# Patient Record
Sex: FEMALE | Race: WHITE | NOT HISPANIC OR LATINO | Employment: FULL TIME | URBAN - NONMETROPOLITAN AREA
[De-identification: names, ages, dates, MRNs, and addresses within clinical notes are randomized per-mention and may not be internally consistent; named-entity substitution may affect disease eponyms.]

---

## 2019-10-16 ENCOUNTER — OFFICE VISIT (OUTPATIENT)
Dept: FAMILY MEDICINE | Facility: CLINIC | Age: 24
End: 2019-10-16
Payer: COMMERCIAL

## 2019-10-16 VITALS
BODY MASS INDEX: 31.15 KG/M2 | DIASTOLIC BLOOD PRESSURE: 72 MMHG | HEART RATE: 82 BPM | OXYGEN SATURATION: 99 % | RESPIRATION RATE: 12 BRPM | SYSTOLIC BLOOD PRESSURE: 118 MMHG | HEIGHT: 61 IN | TEMPERATURE: 99.2 F | WEIGHT: 165 LBS

## 2019-10-16 DIAGNOSIS — R21 RASH AND NONSPECIFIC SKIN ERUPTION: Primary | ICD-10-CM

## 2019-10-16 PROCEDURE — 99213 OFFICE O/P EST LOW 20 MIN: CPT | Performed by: FAMILY MEDICINE

## 2019-10-16 RX ORDER — TRIAMCINOLONE ACETONIDE 5 MG/G
OINTMENT TOPICAL
Qty: 15 G | Refills: 1 | Status: SHIPPED | OUTPATIENT
Start: 2019-10-16 | End: 2020-09-08

## 2019-10-16 ASSESSMENT — MIFFLIN-ST. JEOR: SCORE: 1429.78

## 2019-10-16 ASSESSMENT — PAIN SCALES - GENERAL: PAINLEVEL: NO PAIN (0)

## 2019-10-16 NOTE — NURSING NOTE
"Chief Complaint   Patient presents with     Derm Problem     /72   Pulse 82   Temp 99.2  F (37.3  C) (Tympanic)   Resp 12   Ht 1.556 m (5' 1.25\")   Wt 74.8 kg (165 lb)   LMP  (LMP Unknown)   SpO2 99%   BMI 30.92 kg/m   Estimated body mass index is 30.92 kg/m  as calculated from the following:    Height as of this encounter: 1.556 m (5' 1.25\").    Weight as of this encounter: 74.8 kg (165 lb).  Patient presents to the clinic using No DME      Health Maintenance that is potentially due pending provider review:    Health Maintenance Due   Topic Date Due     PREVENTIVE CARE VISIT  1993     HIV SCREENING  03/18/2008     HPV IMMUNIZATION (2 - Female 3-dose series) 08/25/2011     PNEUMOCOCCAL IMMUNIZATION 19-64 MEDIUM RISK (1 of 1 - PPSV23) 03/18/2012     PAP  09/17/2017     PHQ-2  01/01/2019     INFLUENZA VACCINE (1) 09/01/2019        Pap done at [facility]  Declines flu shot.        "

## 2019-10-16 NOTE — LETTER
October 16, 2019      Laura Gaston  [address] [place] [address]        To Whom It May Concern:    Laura Gaston was seen in our clinic 10/16/19. Please excuse her absence.       Sincerely,        Brad Dupont MD

## 2019-10-16 NOTE — PROGRESS NOTES
SUBJECTIVE   Laura Gaston is a 24 year old female who presents with     Rash      Duration: 10/10/19    Description  Location: hands, arms, legs, feet - rash went away but stayed on hands  Itching: itch at beginning of sx's    Intensity:  moderate    Accompanying signs and symptoms: numb in rash area, scabs,     History (similar episodes/previous evaluation): autoimmune disorder lichenoid kerotisis    Precipitating or alleviating factors:  New exposures:  None  Recent travel: YES- was in [place] 10/3/-10/10 rash started 10/10     Therapies tried and outcome: Lotrimin lotion      PCP   [facility] [phone]    Health Maintenance        Health Maintenance Due   Topic Date Due     PREVENTIVE CARE VISIT  1993     HIV SCREENING  03/18/2008     HPV IMMUNIZATION (2 - Female 3-dose series) 08/25/2011     PNEUMOCOCCAL IMMUNIZATION 19-64 MEDIUM RISK (1 of 1 - PPSV23) 03/18/2012     PAP  09/17/2017     PHQ-2  01/01/2019     INFLUENZA VACCINE (1) 09/01/2019       HPI        Patient Active Problem List   Diagnosis     Ankle injury     Left ankle injury     Current Outpatient Medications   Medication     NECON 1/35, 28, 1-35 MG-MCG per tablet     No current facility-administered medications for this visit.        Patient Active Problem List   Diagnosis     Ankle injury     Left ankle injury     No past surgical history on file.    Social History     Tobacco Use     Smoking status: Current Every Day Smoker     Packs/day: 0.50     Types: Cigarettes     Smokeless tobacco: Never Used   Substance Use Topics     Alcohol use: Yes     Comment: rare     Family History   Problem Relation Age of Onset     Cardiovascular Maternal Grandmother 64        aortic aneur.     Psychotic Disorder Maternal Grandmother         depression     Pancreatic Cancer Maternal Grandmother      Cancer - colorectal Maternal Grandfather      Psychotic Disorder Maternal Grandfather         depression     Psychotic Disorder Paternal  "Grandmother         depression     Psychotic Disorder Paternal Grandfather         depression         Current Outpatient Medications   Medication Sig Dispense Refill     NECON 1/35, 28, 1-35 MG-MCG per tablet Take 1 tablet by mouth daily  3     Allergies   Allergen Reactions     Latex Hives     Precaution     No lab results found.   BP Readings from Last 3 Encounters:   10/16/19 118/72   08/03/16 122/70   11/23/14 128/70    Wt Readings from Last 3 Encounters:   10/16/19 74.8 kg (165 lb)   08/03/16 74.8 kg (164 lb 12.8 oz)   11/23/14 72.2 kg (159 lb 3.2 oz)                    Reviewed and updated:  Tobacco  Allergies  Meds  Med Hx  Surg Hx  Fam Hx  Soc Hx     ROS:  Constitutional, HEENT, cardiovascular, pulmonary, gi and gu systems are negative, except as otherwise noted.    PHYSICAL EXAM   /72   Pulse 82   Temp 99.2  F (37.3  C) (Tympanic)   Resp 12   Ht 1.556 m (5' 1.25\")   Wt 74.8 kg (165 lb)   LMP  (LMP Unknown)   SpO2 99%   BMI 30.92 kg/m    Body mass index is 30.92 kg/m .  GENERAL: healthy, alert and no distress  EYES: Eyes grossly normal to inspection, PERRL and conjunctivae and sclerae normal  MS: no gross musculoskeletal defects noted, no edema  SKIN: few sporadic, small erythematous rashes involving hands bilaterally and 1x involving right neck with superficial scales as shown below  NEURO: Normal strength and tone, mentation intact and speech normal                      Assessment & Plan     (R21) Rash and nonspecific skin eruption  (primary encounter diagnosis)  Comment: Differentials discussed in detail including nummular dermatitis, fungal infection and scabies.  Known to have autoimmune lichenoid keratosis.  Suggested to use Kenalog ointment and continue regular moisturizers.  Follow-up if rashes persist or worsen.  Patient understood and in agreement with above plan.  All questions answered.  Plan: triamcinolone (KENALOG) 0.5 % external ointment             Brad Dupont, " MD  [facility]

## 2020-05-06 ENCOUNTER — NURSE TRIAGE (OUTPATIENT)
Dept: NURSING | Facility: CLINIC | Age: 25
End: 2020-05-06

## 2020-05-06 ENCOUNTER — OFFICE VISIT (OUTPATIENT)
Dept: URGENT CARE | Facility: URGENT CARE | Age: 25
End: 2020-05-06
Payer: COMMERCIAL

## 2020-05-06 VITALS
WEIGHT: 170 LBS | HEART RATE: 86 BPM | SYSTOLIC BLOOD PRESSURE: 130 MMHG | DIASTOLIC BLOOD PRESSURE: 80 MMHG | BODY MASS INDEX: 32.1 KG/M2 | RESPIRATION RATE: 16 BRPM | OXYGEN SATURATION: 100 % | HEIGHT: 61 IN

## 2020-05-06 DIAGNOSIS — S61.012A LACERATION OF LEFT THUMB WITHOUT FOREIGN BODY WITHOUT DAMAGE TO NAIL, INITIAL ENCOUNTER: Primary | ICD-10-CM

## 2020-05-06 PROCEDURE — 12001 RPR S/N/AX/GEN/TRNK 2.5CM/<: CPT | Performed by: NURSE PRACTITIONER

## 2020-05-06 ASSESSMENT — MIFFLIN-ST. JEOR: SCORE: 1447.45

## 2020-05-06 NOTE — TELEPHONE ENCOUNTER
Mother calling with patient on the phone.  Verbal consent given by patient.  Mother says she cut her thumb on knife.  Is still bleeding but is holding pressure on it.  Cut is about an inch long.  Says she could see fat in there when she looked at the cut.  Patient is on her way to [facility] and just wants to make sure they are open.  Is in the parking lot now.    Triaged to disposition of Go to Urgent Care now.  Called [facility] to be sure they are open and okay for patient to come in.  Okay given.    Yvonne Villalobos, RN  Triage Nurse Advisor      Reason for Disposition    Skin is split open or gaping  (or length > 1/2 inch or 12 mm on the skin, 1/4 inch or 6 mm on the face)    [1] Bleeding AND [2] won't stop after 10 minutes of direct pressure (using correct technique)    Additional Information    Negative: [1] Major bleeding (e.g., actively dripping or spurting) AND [2] can't be stopped    Negative: Amputation    Negative: Shock suspected (e.g., cold/pale/clammy skin, too weak to stand, low BP, rapid pulse)    Negative: [1] Knife wound (or other possibly deep cut) AND [2] to chest, abdomen, back, neck, or head    Negative: [1] Cutter (self-mutilator) AND [2] suicidal or out-of-control    Negative: Sounds like a life-threatening emergency to the triager    Protocols used: CUTS AND [id number]

## 2020-05-06 NOTE — PATIENT INSTRUCTIONS
Keep area clean and dry.    Watch for any signs of infection and should any occur follow up with your primary care provider for recheck.    Follow-up with your primary care provider next week and as needed.    Indications for emergent return to emergency department discussed with patient, who verbalized good understanding and agreement.  Patient understands the limitations of today's evaluation.         Patient Education     Laceration: Skin Adhesive  A laceration is a cut through the skin. You have a laceration that your healthcare provider has closed with skin adhesive, a type of skin glue.  Home care  You may take acetaminophen or ibuprofen for pain, unless your provider prescribed another pain medicine. If you have chronic liver or kidney disease or ever had a stomach ulcer or gastrointestinal bleeding, talk with your healthcare provider before using these medicines.  General care    Keep the wound clean and dry. You may shower or bathe as usual, but don't use soaps, lotions, or ointments on the wound area. Don't scrub the wound. After bathing, pat the wound dry with a soft towel.    Don't scratch, rub, or pick at the adhesive film. Don't place tape directly over the film.    Don't apply liquids (such as peroxide), ointments, or creams to the wound while the film is in place.    Most skin wounds heal without problems. However, an infection sometimes occurs despite proper treatment. Therefore, watch for the signs of infection listed below.  Follow-up care  Follow up with your healthcare provider, or as advised. The adhesive film or skin glue usually falls off in 5 to 10 days.  When to seek medical advice  Call your healthcare provider right away if any of these occur:    Signs of infection:  ? Fever of 100.4 F (38 C) or higher, or as advised by your healthcare provider  ? Increasing pain in the wound  ? Increasing redness or swelling  ? Pus coming from the wound    Wound bleeds more than a small amount or  bleeding doesn t stop    Glue comes off earlier than expected and the wound edges come apart    You feel numbness or weakness in the wound area that doesn t go away  Date Last Reviewed: 6/1/2016 2000-2019 [employer]. [address] [place] [address], [place] [address]. All rights reserved. This information is not intended as a substitute for professional medical care. Always follow your healthcare professional's instructions.           Patient Education     Extremity Laceration: Skin Glue  A laceration is a cut through the skin. You have a laceration that has been closed with skin glue. This is used on cuts that have smooth edges and are not infected. It's best used on straight, clean cuts on areas that do not get a lot of tension.  You may need a tetanus shot. This is given if you have no record of a shot, and the object that caused the cut may lead to tetanus.  Home care    Your healthcare provider may prescribe an antibiotic. This is to help prevent infection. Follow all instructions for taking this medicine. Take the medicine every day until it is gone or you are told to stop. You should not have any left over.    The healthcare provider may prescribe medicines for pain. Follow instructions for taking them.    Follow the healthcare provider s instructions on how to care for the cut.    No bandage is needed. Skin glue peels off on its own within 5 to 10 days. Most skin wounds heal within 10 days.    Keep the wound clean. You may shower or bathe as usual, but do not use soaps, lotions, or ointments on the wound area. Do not scrub the wound. After bathing, pat the wound dry with a soft towel.    Don't scratch, rub, or pick at the film. Don't place tape directly over the film.    Don't put liquids such as peroxide, ointments, or creams on the wound while the skin glue is in place. Many oil based products can weaken and dissolve the glue.    Don't do any activities that may reinjure your wound.    Don't do  any activities that cause heavy sweating. Protect the wound from sunlight.    Most skin wounds heal without problems. But an infection sometimes occurs even with proper treatment. Watch for the signs of infection listed below.  Follow-up care  Follow up as directed with your healthcare provider, or as advised.  When to seek medical advice  Call your healthcare provider right away if any of these occur:    Wound bleeding not controlled by direct pressure    Signs of infection, including increasing pain in the wound, increasing wound redness or swelling, or pus or bad odor coming from the wound    Fever of 100.4 F (38. C) or higher, or as directed by your healthcare provider    Wound edges reopen    Wound changes colors    Numbness around the wound     Decreased movement around the injured area  Date Last Reviewed: 7/1/2017 2000-2019 [employer]. [address] [place] [address], [place] [address]. All rights reserved. This information is not intended as a substitute for professional medical care. Always follow your healthcare professional's instructions.

## 2020-09-08 ENCOUNTER — OFFICE VISIT (OUTPATIENT)
Dept: FAMILY MEDICINE | Facility: CLINIC | Age: 25
End: 2020-09-08
Payer: COMMERCIAL

## 2020-09-08 ENCOUNTER — ANCILLARY PROCEDURE (OUTPATIENT)
Dept: GENERAL RADIOLOGY | Facility: CLINIC | Age: 25
End: 2020-09-08
Attending: NURSE PRACTITIONER
Payer: COMMERCIAL

## 2020-09-08 VITALS
DIASTOLIC BLOOD PRESSURE: 78 MMHG | WEIGHT: 174 LBS | RESPIRATION RATE: 16 BRPM | HEIGHT: 61 IN | SYSTOLIC BLOOD PRESSURE: 138 MMHG | HEART RATE: 92 BPM | BODY MASS INDEX: 32.85 KG/M2 | TEMPERATURE: 98.5 F

## 2020-09-08 DIAGNOSIS — M25.572 PAIN IN JOINT, ANKLE AND FOOT, LEFT: Primary | ICD-10-CM

## 2020-09-08 DIAGNOSIS — M25.572 PAIN IN JOINT, ANKLE AND FOOT, LEFT: ICD-10-CM

## 2020-09-08 PROCEDURE — 99213 OFFICE O/P EST LOW 20 MIN: CPT | Performed by: NURSE PRACTITIONER

## 2020-09-08 PROCEDURE — 73610 X-RAY EXAM OF ANKLE: CPT | Mod: LT

## 2020-09-08 ASSESSMENT — MIFFLIN-ST. JEOR: SCORE: 1465.6

## 2020-09-08 NOTE — PROGRESS NOTES
"Subjective     Laura Gaston is a 25 year old female who presents to clinic today for the following health issues:    HPI       Musculoskeletal problem/pain  Onset/Duration: 4 days   Description  Location: ankle  - left  Joint Swelling: YES  Redness: no - bruising   Pain: YES  Warmth: no  Intensity:  moderate  Progression of Symptoms:  improving and same  Accompanying signs and symptoms:   Fevers: no  Numbness/tingling/weakness: YES- tingling   History  Trauma to the area: YES- twisted ankle   Recent illness:  no  Previous similar problem: no  Previous evaluation:  no  Precipitating or alleviating factors:  Aggravating factors include: standing and walking  Therapies tried and outcome: ice, support wrap, acetaminophen and Ibuprofen    Review of Systems   Constitutional, HEENT, cardiovascular, pulmonary, gi and gu systems are negative, except as otherwise noted.      Objective    /78 (Cuff Size: Adult Large)   Pulse 92   Temp 98.5  F (36.9  C) (Tympanic)   Resp 16   Ht 1.556 m (5' 1.25\")   Wt 78.9 kg (174 lb)   BMI 32.61 kg/m    Body mass index is 32.61 kg/m .  Physical Exam   GENERAL: healthy, alert and no distress  MS: tenderness to palpation left lateral malleolus, flexion and extension limited by discomfort   PSYCH: mentation appears normal, affect normal/bright    Xray - negative per SIM Jaime CNP          Assessment & Plan     Pain in joint, ankle and foot, left  No acute distress.  X ray unremarkable per SIM Jaime CNP.  Continue with supportive bracing, ice and rest.  Symptomatic care and follow up discussed.  - XR Ankle Left G/E 3 Views; Future     Home care instructions were reviewed with the patient. The risks, benefits and treatment options of prescribed medications or other treatments have been discussed with the patient. The patient verbalized their understanding and should call or follow up if no improvement or if they develop further problems.    Patient " Instructions     Patient Education     Treating Ankle Sprains  Treatment will depend on how bad your sprain is. For a severe sprain, healing may take 3 months or more.  Right after your injury: Use R.I.C.E.    BIG: Rest: At first, keep weight off the ankle as much as you can. You may be given crutches to help you walk without putting weight on the ankle.    BIG: Ice: Put an ice pack on the ankle for 20 minutes. Remove the pack and wait at least 30 minutes. Repeat for up to 3 days. This helps reduce swelling.    BIG: Compression: To reduce swelling and keep the joint stable, you may need to wrap the ankle with an elastic bandage. For more severe sprains, you may need an ankle brace, a boot, or a cast.    BIG: Elevation: To reduce swelling, keep your ankle raised above your heart when you sit or lie down.  Medicine  Your healthcare provider may suggest oral nonsteroidal anti-inflammatory medicine (NSAIDs), such as ibuprofen. This relieves the pain and helps reduce swelling. Be sure to take your medicine as directed.  Exercises    After about 2 to 3 weeks, you may be given exercises to strengthen the ligaments and muscles in the ankle. Doing these exercises will help prevent another ankle sprain. Exercises may include standing on your toes and then on your heels and doing ankle curls.    Sit on the edge of a sturdy table or lie on your back.    Pull your toes toward you. Then point them away from you. Repeat for 2 to 3 minutes.  Date Last Reviewed: 1/1/2018 2000-2019 [employer]. [address] [place] [address], [place] [address]. All rights reserved. This information is not intended as a substitute for professional medical care. Always follow your healthcare professional's instructions.               Return in about 1 week (around 9/15/2020).    SIM Jaime [facility]

## 2020-09-08 NOTE — LETTER
September 8, 2020      Laura PUCKETT Alek  [address] [place] [address]        To Whom It May Concern:    Laura Gaston was seen in our clinic. Please allow her to use a stool as needed due to ankle injury for the next two weeks.      Sincerely,        SIM Jaime CNP

## 2020-09-08 NOTE — PATIENT INSTRUCTIONS
Patient Education     Treating Ankle Sprains  Treatment will depend on how bad your sprain is. For a severe sprain, healing may take 3 months or more.  Right after your injury: Use R.I.C.E.    BIG: Rest: At first, keep weight off the ankle as much as you can. You may be given crutches to help you walk without putting weight on the ankle.    BIG: Ice: Put an ice pack on the ankle for 20 minutes. Remove the pack and wait at least 30 minutes. Repeat for up to 3 days. This helps reduce swelling.    BIG: Compression: To reduce swelling and keep the joint stable, you may need to wrap the ankle with an elastic bandage. For more severe sprains, you may need an ankle brace, a boot, or a cast.    BIG: Elevation: To reduce swelling, keep your ankle raised above your heart when you sit or lie down.  Medicine  Your healthcare provider may suggest oral nonsteroidal anti-inflammatory medicine (NSAIDs), such as ibuprofen. This relieves the pain and helps reduce swelling. Be sure to take your medicine as directed.  Exercises    After about 2 to 3 weeks, you may be given exercises to strengthen the ligaments and muscles in the ankle. Doing these exercises will help prevent another ankle sprain. Exercises may include standing on your toes and then on your heels and doing ankle curls.    Sit on the edge of a sturdy table or lie on your back.    Pull your toes toward you. Then point them away from you. Repeat for 2 to 3 minutes.  Date Last Reviewed: 1/1/2018 2000-2019 [employer]. [address] [place] [address], [place] [address]. All rights reserved. This information is not intended as a substitute for professional medical care. Always follow your healthcare professional's instructions.

## 2020-11-29 ENCOUNTER — HEALTH MAINTENANCE LETTER (OUTPATIENT)
Age: 25
End: 2020-11-29

## 2021-01-06 LAB — HPV ABSTRACT: NORMAL

## 2021-04-08 NOTE — PROGRESS NOTES
Assessment & Plan     INGE (generalized anxiety disorder)  Chronic, worsening.  Discussed good self-cares, increasing daily physical activity and good nutrition.  Also discussed starting buspirone, patient agreeable.  May also use hydroxyzine as needed for anxiety and sleep.  Work on bedtime hygiene as per patient instructions.  Follow-up in 1 month virtually or in clinic for follow-up.  - busPIRone (BUSPAR) 5 MG tablet; Take 1 tablet (5 mg) by mouth 2 times daily for 5 days, THEN 1 tablet (5 mg) 3 times daily for 26 days.  - hydrOXYzine (ATARAX) 25 MG tablet; Take 1 tablet (25 mg) by mouth 3 times daily as needed for anxiety or other (insomnia)    Onychomycosis  Acute, recommend over-the-counter ProX Clearz as directed on package.  If not improving return to clinic.    Elevated blood pressure reading  Patient had elevated blood pressure reading in January at GYN visit.  Blood pressure is just under goal today.  May have anxiety component.  Recommended a nurse only or pharmacy visit for blood pressure check in approximately 1 month.    Tobacco abuse  Chronic, is ready to quit.  Has trialed Chantix in the past with side effects.  Unable to do patches.  Will start Wellbutrin tapering up after 3 days.  Can check in on progress in approximately 1 month with visit as above.  - buPROPion (WELLBUTRIN SR) 150 MG 12 hr tablet; Take 1 tablet (150 mg) by mouth daily for 3 days, THEN 1 tablet (150 mg) 2 times daily for 28 days.    Encounter for smoking cessation counseling    - buPROPion (WELLBUTRIN SR) 150 MG 12 hr tablet; Take 1 tablet (150 mg) by mouth daily for 3 days, THEN 1 tablet (150 mg) 2 times daily for 28 days.             Tobacco Cessation:   reports that she has been smoking cigarettes. She has been smoking about 0.50 packs per day. She has never used smokeless tobacco.  Tobacco Cessation Action Plan: Pharmacotherapies : Zyban/Wellbutrin    BMI:   Estimated body mass index is 32.35 kg/m  as calculated from the  "following:    Height as of this encounter: 1.556 m (5' 1.25\").    Weight as of this encounter: 78.3 kg (172 lb 9.6 oz).   Weight management plan: Discussed healthy diet and exercise guidelines    See Patient Instructions    Return in about 1 month (around 5/9/2021) for Recheck.    Maggi Frank, SIM CNP  [facility]    Darling Sanchez is a 26 year old who presents for the following health issues     HPI   Chief Complaint   Patient presents with     Anxiety     Fungal Infection     Hypertension     had elevated BP when in for pap _01/06/2021 Pap, LB + Reflex hr HPV CERVIX&CERVIX     Case Report see note   [facility] - Lab: [address]        New Patient/Transfer of Care  Depression and Anxiety Follow-Up    How are you doing with your depression since your last visit? No depression    How are you doing with your anxiety since your last visit?  Worsened     Are you having other symptoms that might be associated with depression or anxiety? No    Have you had a significant life event? No     Do you have any concerns with your use of alcohol or other drugs? No    Has difficulty shutting off mind - worries   Had a traumatic loss of grandmother  Has cut out most caffeine outside of coffee - stays away from sweets     Social History     Tobacco Use     Smoking status: Current Every Day Smoker     Packs/day: 0.50     Types: Cigarettes     Smokeless tobacco: Never Used   Substance Use Topics     Alcohol use: Yes     Comment: rare     Drug use: No     PHQ 4/9/2021   PHQ-9 Total Score 9   Q9: Thoughts of better off dead/self-harm past 2 weeks Not at all     INGE-7 SCORE 4/9/2021   Total Score 15 (severe anxiety)   Total Score 15     Last PHQ-9 4/9/2021   1.  Little interest or pleasure in doing things 0   2.  Feeling down, depressed, or hopeless 1   3.  Trouble falling or staying asleep, or sleeping too much 3   4.  Feeling tired or having little energy 3 "   5.  Poor appetite or overeating 1   6.  Feeling bad about yourself 1   7.  Trouble concentrating 0   8.  Moving slowly or restless 0   Q9: Thoughts of better off dead/self-harm past 2 weeks 0   PHQ-9 Total Score 9     INGE-7  4/9/2021   1. Feeling nervous, anxious, or on edge 3   2. Not being able to stop or control worrying 3   3. Worrying too much about different things 3   4. Trouble relaxing 3   5. Being so restless that it is hard to sit still 0   6. Becoming easily annoyed or irritable 0   7. Feeling afraid, as if something awful might happen 3   INGE-7 Total Score 15       Suicide Assessment Five-step Evaluation and Treatment (SAFE-T)      How many servings of fruits and vegetables do you eat daily?  2-3    On average, how many sweetened beverages do you drink each day (Examples: soda, juice, sweet tea, etc.  Do NOT count diet or artificially sweetened beverages)?   0    How many days per week do you exercise enough to make your heart beat faster? 3 or less    How many minutes a day do you exercise enough to make your heart beat faster? 30 - 60  How many days per week do you miss taking your medication? N/A  What makes it hard for you to take your medications?  N/A   Never has taken any medications for anxiety  Has had anxiety for 9 years    Concern -  ? Fungal infection  Onset: since last fall  Description: one toe great nail  Intensity: mild  Progression of Symptoms:  worsening  Accompanying Signs & Symptoms: 0  Previous history of similar problem: 0  Precipitating factors:        Worsened by: 0  Alleviating factors:        Improved by: 0  Therapies tried and outcome: Tried OTC fungal cream that did not help    Smoking Cessation   Tried Chantix around the age of 19 - had horrible nightmares   Unable to patches       Review of Systems   Constitutional, HEENT, cardiovascular, pulmonary, gi and gu systems are negative, except as otherwise noted.      Objective    /88   Pulse 82   Temp 98.8  F (37.1  C)  "  Ht 1.556 m (5' 1.25\")   Wt 78.3 kg (172 lb 9.6 oz)   SpO2 98%   Breastfeeding No   BMI 32.35 kg/m    Body mass index is 32.35 kg/m .  Physical Exam   GENERAL APPEARANCE: healthy, alert and no distress  RESP: lungs clear to auscultation - no rales, rhonchi or wheezes  CV: regular rates and rhythm, normal S1 S2, no S3 or S4 and no murmur, click or rub  ABDOMEN: soft, nontender, without hepatosplenomegaly or masses and bowel sounds normal  MS: extremities normal- no gross deformities noted  SKIN: no suspicious lesions or rashes  PSYCH: mentation appears normal, affect normal/bright and anxious    Diagnostic Test Results:  none            "

## 2021-04-09 ENCOUNTER — OFFICE VISIT (OUTPATIENT)
Dept: FAMILY MEDICINE | Facility: CLINIC | Age: 26
End: 2021-04-09
Payer: COMMERCIAL

## 2021-04-09 VITALS
DIASTOLIC BLOOD PRESSURE: 88 MMHG | WEIGHT: 172.6 LBS | OXYGEN SATURATION: 98 % | BODY MASS INDEX: 32.59 KG/M2 | HEART RATE: 82 BPM | TEMPERATURE: 98.8 F | SYSTOLIC BLOOD PRESSURE: 138 MMHG | HEIGHT: 61 IN

## 2021-04-09 DIAGNOSIS — Z71.6 ENCOUNTER FOR SMOKING CESSATION COUNSELING: ICD-10-CM

## 2021-04-09 DIAGNOSIS — Z72.0 TOBACCO ABUSE: ICD-10-CM

## 2021-04-09 DIAGNOSIS — R03.0 ELEVATED BLOOD PRESSURE READING: ICD-10-CM

## 2021-04-09 DIAGNOSIS — B35.1 ONYCHOMYCOSIS: ICD-10-CM

## 2021-04-09 DIAGNOSIS — F41.1 GAD (GENERALIZED ANXIETY DISORDER): Primary | ICD-10-CM

## 2021-04-09 LAB — PAP-ABSTRACT: NORMAL

## 2021-04-09 PROCEDURE — 99214 OFFICE O/P EST MOD 30 MIN: CPT | Performed by: NURSE PRACTITIONER

## 2021-04-09 RX ORDER — HYDROXYZINE HYDROCHLORIDE 25 MG/1
25 TABLET, FILM COATED ORAL 3 TIMES DAILY PRN
Qty: 30 TABLET | Refills: 0 | Status: SHIPPED | OUTPATIENT
Start: 2021-04-09 | End: 2021-10-05

## 2021-04-09 RX ORDER — BUPROPION HYDROCHLORIDE 150 MG/1
TABLET, EXTENDED RELEASE ORAL
Qty: 59 TABLET | Refills: 0 | Status: SHIPPED | OUTPATIENT
Start: 2021-04-09 | End: 2021-05-11

## 2021-04-09 RX ORDER — BUSPIRONE HYDROCHLORIDE 5 MG/1
TABLET ORAL
Qty: 88 TABLET | Refills: 0 | Status: SHIPPED | OUTPATIENT
Start: 2021-04-09 | End: 2021-05-11

## 2021-04-09 RX ORDER — NORETHINDRONE ACETATE AND ETHINYL ESTRADIOL .02; 1 MG/1; MG/1
TABLET ORAL DAILY
COMMUNITY
End: 2022-09-04

## 2021-04-09 ASSESSMENT — ANXIETY QUESTIONNAIRES
6. BECOMING EASILY ANNOYED OR IRRITABLE: NOT AT ALL
2. NOT BEING ABLE TO STOP OR CONTROL WORRYING: NEARLY EVERY DAY
7. FEELING AFRAID AS IF SOMETHING AWFUL MIGHT HAPPEN: NEARLY EVERY DAY
GAD7 TOTAL SCORE: 15
4. TROUBLE RELAXING: NEARLY EVERY DAY
GAD7 TOTAL SCORE: 15
7. FEELING AFRAID AS IF SOMETHING AWFUL MIGHT HAPPEN: NEARLY EVERY DAY
5. BEING SO RESTLESS THAT IT IS HARD TO SIT STILL: NOT AT ALL
1. FEELING NERVOUS, ANXIOUS, OR ON EDGE: NEARLY EVERY DAY
3. WORRYING TOO MUCH ABOUT DIFFERENT THINGS: NEARLY EVERY DAY
GAD7 TOTAL SCORE: 15

## 2021-04-09 ASSESSMENT — MIFFLIN-ST. JEOR: SCORE: 1454.25

## 2021-04-09 ASSESSMENT — PATIENT HEALTH QUESTIONNAIRE - PHQ9
SUM OF ALL RESPONSES TO PHQ QUESTIONS 1-9: 9
SUM OF ALL RESPONSES TO PHQ QUESTIONS 1-9: 9
10. IF YOU CHECKED OFF ANY PROBLEMS, HOW DIFFICULT HAVE THESE PROBLEMS MADE IT FOR YOU TO DO YOUR WORK, TAKE CARE OF THINGS AT HOME, OR GET ALONG WITH OTHER PEOPLE: SOMEWHAT DIFFICULT

## 2021-04-09 NOTE — PATIENT INSTRUCTIONS
INGE (generalized anxiety disorder)  Chronic, worsening.  Discussed good self-cares, increasing daily physical activity and good nutrition.  Also discussed starting buspirone, patient agreeable.  May also use hydroxyzine as needed for anxiety and sleep.  Work on bedtime hygiene as per patient instructions.  Follow-up in 1 month virtually or in clinic for follow-up.  - busPIRone (BUSPAR) 5 MG tablet; Take 1 tablet (5 mg) by mouth 2 times daily for 5 days, THEN 1 tablet (5 mg) 3 times daily for 26 days.  - hydrOXYzine (ATARAX) 25 MG tablet; Take 1 tablet (25 mg) by mouth 3 times daily as needed for anxiety or other (insomnia)    Onychomycosis  Acute, recommend over-the-counter ProX Clearz as directed on package.  If not improving return to clinic.    Elevated blood pressure reading  Patient had elevated blood pressure reading in January at GYN visit.  Blood pressure is just under goal today.  May have anxiety component.  Recommended a nurse only or pharmacy visit for blood pressure check in approximately 1 month.    Tobacco abuse  Chronic, is ready to quit.  Has trialed Chantix in the past with side effects.  Unable to do patches.  Will start Wellbutrin tapering up after 3 days.  Can check in on progress in approximately 1 month with visit as above.  - buPROPion (WELLBUTRIN SR) 150 MG 12 hr tablet; Take 1 tablet (150 mg) by mouth daily for 3 days, THEN 1 tablet (150 mg) 2 times daily for 28 days.    Encounter for smoking cessation counseling    - buPROPion (WELLBUTRIN SR) 150 MG 12 hr tablet; Take 1 tablet (150 mg) by mouth daily for 3 days, THEN 1 tablet (150 mg) 2 times daily for 28 days.

## 2021-04-10 ASSESSMENT — ANXIETY QUESTIONNAIRES: GAD7 TOTAL SCORE: 15

## 2021-05-11 ENCOUNTER — VIRTUAL VISIT (OUTPATIENT)
Dept: FAMILY MEDICINE | Facility: CLINIC | Age: 26
End: 2021-05-11
Payer: COMMERCIAL

## 2021-05-11 DIAGNOSIS — F41.1 GAD (GENERALIZED ANXIETY DISORDER): Primary | ICD-10-CM

## 2021-05-11 DIAGNOSIS — Z72.0 TOBACCO ABUSE: ICD-10-CM

## 2021-05-11 DIAGNOSIS — Z71.6 ENCOUNTER FOR SMOKING CESSATION COUNSELING: ICD-10-CM

## 2021-05-11 PROCEDURE — 99213 OFFICE O/P EST LOW 20 MIN: CPT | Mod: GT | Performed by: NURSE PRACTITIONER

## 2021-05-11 RX ORDER — BUPROPION HYDROCHLORIDE 150 MG/1
150 TABLET, EXTENDED RELEASE ORAL 2 TIMES DAILY
Qty: 180 TABLET | Refills: 1 | Status: SHIPPED | OUTPATIENT
Start: 2021-05-11 | End: 2021-11-09

## 2021-05-11 RX ORDER — BUSPIRONE HYDROCHLORIDE 15 MG/1
15 TABLET ORAL 2 TIMES DAILY
Qty: 180 TABLET | Refills: 1 | Status: SHIPPED | OUTPATIENT
Start: 2021-05-11 | End: 2021-10-05 | Stop reason: ALTCHOICE

## 2021-05-11 ASSESSMENT — ANXIETY QUESTIONNAIRES
GAD7 TOTAL SCORE: 15
5. BEING SO RESTLESS THAT IT IS HARD TO SIT STILL: SEVERAL DAYS
7. FEELING AFRAID AS IF SOMETHING AWFUL MIGHT HAPPEN: SEVERAL DAYS
2. NOT BEING ABLE TO STOP OR CONTROL WORRYING: NEARLY EVERY DAY
1. FEELING NERVOUS, ANXIOUS, OR ON EDGE: NEARLY EVERY DAY
6. BECOMING EASILY ANNOYED OR IRRITABLE: SEVERAL DAYS
3. WORRYING TOO MUCH ABOUT DIFFERENT THINGS: NEARLY EVERY DAY

## 2021-05-11 ASSESSMENT — PATIENT HEALTH QUESTIONNAIRE - PHQ9: 5. POOR APPETITE OR OVEREATING: NEARLY EVERY DAY

## 2021-05-11 NOTE — PATIENT INSTRUCTIONS
INGE (generalized anxiety disorder)  Chronic, some improved on Buspar. Tales the edge off. Feels like needs more. Sometimes having difficulty taking three times daily due to work schedule. Will increase to 15 mg two times daily. Patient to check in with visit in 3-4 weeks if not doing better. Continue to work on good self-cares.   - busPIRone (BUSPAR) 15 MG tablet; Take 1 tablet (15 mg) by mouth 2 times daily    Tobacco abuse  Started on Wellbutrin approximately 1 month ago. Tolerating well. Helping with cravings, has cut back, but hasn't quit completely. Stressors making it difficult. Continue Wellbutrin and continue to work on stressors. Follow-up as needed.   - buPROPion (WELLBUTRIN SR) 150 MG 12 hr tablet; Take 1 tablet (150 mg) by mouth 2 times daily    Encounter for smoking cessation counseling    - buPROPion (WELLBUTRIN SR) 150 MG 12 hr tablet; Take 1 tablet (150 mg) by mouth 2 times daily

## 2021-05-11 NOTE — PROGRESS NOTES
"Laura is a 26 year old who is being evaluated via a billable video visit.      How would you like to obtain your AVS? MyChart  If the video visit is dropped, the invitation should be resent by: Text to cell phone: [phone]  Will anyone else be joining your video visit? No      Video Start Time: 10:47 AM    Assessment & Plan     INGE (generalized anxiety disorder)  Chronic, some improved on Buspar. Tales the edge off. Feels like needs more. Sometimes having difficulty taking three times daily due to work schedule. Will increase to 15 mg two times daily. Patient to check in with visit in 3-4 weeks if not doing better. Continue to work on good self-cares.   - busPIRone (BUSPAR) 15 MG tablet; Take 1 tablet (15 mg) by mouth 2 times daily    Tobacco abuse  Started on Wellbutrin approximately 1 month ago. Tolerating well. Helping with cravings, has cut back, but hasn't quit completely. Stressors making it difficult. Continue Wellbutrin and continue to work on stressors. Follow-up as needed.   - buPROPion (WELLBUTRIN SR) 150 MG 12 hr tablet; Take 1 tablet (150 mg) by mouth 2 times daily    Encounter for smoking cessation counseling    - buPROPion (WELLBUTRIN SR) 150 MG 12 hr tablet; Take 1 tablet (150 mg) by mouth 2 times daily             See Patient Instructions    Return in about 1 month (around 6/11/2021), or if symptoms worsen or fail to improve.    Maggi Frank, SIM CNP  [facility]    Subjective   Laura is a 26 year old who presents for the following health issues:    HPI     Hydroxyzine-  States there is only a 15 minute window of where she becomes drowsy and if she doesn't fall asleep right away, then she's \"wired\".    Anxiety Follow-Up    How are you doing with your anxiety since your last visit? Improved Seems like medication is taking the edge off, however, feels that she wrongly assumed taking 3 pills a day would be better than taking the edge off    Are you having other " symptoms that might be associated with anxiety? No    Have you had a significant life event? No     Are you feeling depressed? No    Do you have any concerns with your use of alcohol or other drugs? No      Not doing any screen time  Working on bedtime hygiene          Social History     Tobacco Use     Smoking status: Current Every Day Smoker     Packs/day: 0.50     Types: Cigarettes     Smokeless tobacco: Never Used   Substance Use Topics     Alcohol use: Yes     Comment: rare     Drug use: No     INGE-7 SCORE 4/9/2021 5/11/2021   Total Score 15 (severe anxiety) -   Total Score 15 15     PHQ 4/9/2021   PHQ-9 Total Score 9   Q9: Thoughts of better off dead/self-harm past 2 weeks Not at all       Review of Systems   Constitutional, HEENT, cardiovascular, pulmonary, gi and gu systems are negative, except as otherwise noted.      Objective           Vitals:  No vitals were obtained today due to virtual visit.    Physical Exam   GENERAL: Healthy, alert and no distress  EYES: Eyes grossly normal to inspection.  No discharge or erythema, or obvious scleral/conjunctival abnormalities.  RESP: No audible wheeze, cough, or visible cyanosis.  No visible retractions or increased work of breathing.    SKIN: Visible skin clear. No significant rash, abnormal pigmentation or lesions.  NEURO: Cranial nerves grossly intact.  Mentation and speech appropriate for age.  PSYCH: Mentation appears normal, affect normal/bright, judgement and insight intact, normal speech and appearance well-groomed.    Diagnostic Test Results:  none            Video-Visit Details    Type of service:  Video Visit    Video End Time:11:02 AM    Originating Location (pt. Location): Home    Distant Location (provider location):  [facility]     Platform used for Video Visit: Gruvie

## 2021-05-12 ASSESSMENT — ANXIETY QUESTIONNAIRES: GAD7 TOTAL SCORE: 15

## 2021-06-14 ENCOUNTER — HOSPITAL ENCOUNTER (EMERGENCY)
Facility: CLINIC | Age: 26
Discharge: HOME | End: 2021-06-14
Attending: NURSE PRACTITIONER | Admitting: NURSE PRACTITIONER
Payer: COMMERCIAL

## 2021-06-14 VITALS
SYSTOLIC BLOOD PRESSURE: 163 MMHG | RESPIRATION RATE: 20 BRPM | BODY MASS INDEX: 32.05 KG/M2 | WEIGHT: 171 LBS | OXYGEN SATURATION: 100 % | DIASTOLIC BLOOD PRESSURE: 107 MMHG | TEMPERATURE: 98.1 F

## 2021-06-14 DIAGNOSIS — M54.50 ACUTE LOW BACK PAIN WITHOUT SCIATICA: ICD-10-CM

## 2021-06-14 PROCEDURE — G0463 HOSPITAL OUTPT CLINIC VISIT: HCPCS | Performed by: NURSE PRACTITIONER

## 2021-06-14 PROCEDURE — 99213 OFFICE O/P EST LOW 20 MIN: CPT | Performed by: NURSE PRACTITIONER

## 2021-06-14 RX ORDER — METHYLPREDNISOLONE 4 MG
TABLET, DOSE PACK ORAL
Qty: 21 TABLET | Refills: 0 | Status: SHIPPED | OUTPATIENT
Start: 2021-06-14 | End: 2021-06-21

## 2021-06-14 RX ORDER — TIZANIDINE 2 MG/1
2-4 TABLET ORAL 3 TIMES DAILY
Qty: 30 TABLET | Refills: 0 | Status: SHIPPED | OUTPATIENT
Start: 2021-06-14 | End: 2021-06-21

## 2021-06-14 RX ORDER — CELECOXIB 200 MG/1
200 CAPSULE ORAL 2 TIMES DAILY
Qty: 28 CAPSULE | Refills: 0 | Status: SHIPPED | OUTPATIENT
Start: 2021-06-14 | End: 2021-07-09 | Stop reason: ALTCHOICE

## 2021-06-14 NOTE — ED TRIAGE NOTES
middle lower back pain started three weeks ago. Pt having increased difficulty since twisting. Denies numbness and or tingling. No loss of bowel or bladder

## 2021-06-14 NOTE — ED PROVIDER NOTES
"  History     Chief Complaint   Patient presents with     Back Pain     middle lower back pain started three weeks ago. Pt having increased difficulty since twisting. Denies numbness and or tingling. No loss of bowel or bladder     HPI  Laura Gaston is a 26 year old female who presents to urgent care with reports of a 3-week history of middle to lower back pain.  Getting into car and felt sudden low back pain  Ache with sharp spasms/shooting pain with rotation  Rates pain 04-5/10 to 09/10  Denies pelvic girdle numbness, loss of bowel or bladder function  \"Felt like someone took a bat to my back\"    Patient denies fever, aches, chills, sweats, ear pain, eye pain, throat pain, chest pain, cough, wheezing, shortness of breath, abdominal pain, nausea, vomiting, diarrhea, dysuria, speech difficulty, left or right-sided body weakness, mental confusion, thoughts of harming self.  Patient reports feeling well otherwise    Allergies:  Allergies   Allergen Reactions     Latex Hives     Precaution       Problem List:    Patient Active Problem List    Diagnosis Date Noted     INGE (generalized anxiety disorder) 04/09/2021     Priority: Medium     Tobacco abuse 04/09/2021     Priority: Medium     Ankle injury 11/04/2013     Priority: Medium     Left ankle injury 11/04/2013     Priority: Medium        Past Medical History:    History reviewed. No pertinent past medical history.    Past Surgical History:    History reviewed. No pertinent surgical history.    Family History:    Family History   Problem Relation Age of Onset     Cardiovascular Maternal Grandmother 64        aortic aneur.     Psychotic Disorder Maternal Grandmother         depression     Pancreatic Cancer Maternal Grandmother      Cancer - colorectal Maternal Grandfather      Psychotic Disorder Maternal Grandfather         depression     Psychotic Disorder Paternal Grandmother         depression     Psychotic Disorder Paternal Grandfather         depression "       Social History:  Marital Status:  Single [1]  Social History     Tobacco Use     Smoking status: Current Every Day Smoker     Packs/day: 0.50     Types: Cigarettes     Smokeless tobacco: Never Used   Substance Use Topics     Alcohol use: Yes     Comment: rare     Drug use: No        Medications:    celecoxib (CELEBREX) 200 MG capsule  methylPREDNISolone (MEDROL DOSEPAK) 4 MG tablet therapy pack  tiZANidine (ZANAFLEX) 2 MG tablet  buPROPion (WELLBUTRIN SR) 150 MG 12 hr tablet  busPIRone (BUSPAR) 15 MG tablet  hydrOXYzine (ATARAX) 25 MG tablet  norethindrone-ethinyl estradiol (MICROGESTIN) 1-20 MG-MCG tablet      Review of Systems  As mentioned above in the history present illness. All other systems were reviewed and are negative.      Physical Exam   BP: (!) 163/107  Temp: 98.1  F (36.7  C)  Resp: 20  Weight: 77.6 kg (171 lb)  SpO2: 100 %      Physical Exam  Vitals signs and nursing note reviewed.   Constitutional:       General: She is not in acute distress.     Appearance: She is well-developed. She is not diaphoretic.   HENT:      Head: Normocephalic and atraumatic.      Right Ear: External ear normal.      Left Ear: External ear normal.   Eyes:      General:         Right eye: No discharge.         Left eye: No discharge.      Conjunctiva/sclera: Conjunctivae normal.   Cardiovascular:      Rate and Rhythm: Regular rhythm.      Heart sounds: Normal heart sounds. No murmur. No friction rub.   Pulmonary:      Effort: Pulmonary effort is normal. No respiratory distress.      Breath sounds: Normal breath sounds. No stridor. No wheezing or rales.   Chest:      Chest wall: No tenderness.   Musculoskeletal:      Lumbar back: She exhibits decreased range of motion and tenderness (lumbar region without spinous process step-off, bony tenderness).   Skin:     General: Skin is warm and dry.      Coloration: Skin is not pale.      Findings: No erythema or rash.   Neurological:      Mental Status: She is alert.         ED  Course        Procedures    No results found for this or any previous visit (from the past 24 hour(s)).    Medications - No data to display    Assessments & Plan (with Medical Decision Making)  26-year-old female presenting to urgent a 3-week history of acute low back pain.  Patient denying any pelvic girdle numbness, loss of bowel or bladder.  Patient reports unable to get comfortable and reports severe stabbing pain.  On exam patient has no obvious deformities.  No red flags to indicate need for imaging today. Patient has musculoskeletal pain without spinous step-offs or crepitus or masses concerning for cauda equina.  At this point in time due to the likely muscle strain I recommend be off work for 2 days and follow-up with orthopedics on or before June 25th.  Work restrictions noted for June 16th through June 25th also.  For pain management recommend Tylenol, Medrol Dosepak followed by Celebrex.  Tizanidine as prescribed as a muscle relaxer.  Patient discharged in stable condition.     I have reviewed the nursing notes.    I have reviewed the findings, diagnosis, plan and need for follow up with the patient.    Discharge Medication List as of 6/14/2021  2:32 PM      START taking these medications    Details   celecoxib (CELEBREX) 200 MG capsule Take 1 capsule (200 mg) by mouth 2 times daily for 14 days, Disp-28 capsule, R-0, E-Prescribe      methylPREDNISolone (MEDROL DOSEPAK) 4 MG tablet therapy pack Follow Package Directions, Disp-21 tablet, R-0, E-Prescribe      tiZANidine (ZANAFLEX) 2 MG tablet Take 1-2 tablets (2-4 mg) by mouth 3 times daily, Disp-30 tablet, R-0, E-Prescribe             Final diagnoses:   Acute low back pain without sciatica       6/14/2021   [facility] EMERGENCY DEPT     Temo, Mckayla Acevedo, SIM CNP  06/19/21 0738

## 2021-06-14 NOTE — DISCHARGE INSTRUCTIONS
Start taking Tylenol 1000 mg 4 times daily for pain management.  In addition to that start the Medrol Dosepak today and take as directed.  Celebrex has been prescribed in place of Aleve, Advil, Motrin, ibuprofen.  This medication will reduce pain and inflammation.  It is to be taken twice daily with food.  I have also prescribed a muscle relaxer called tizanidine.  This can be taken 1 or 2 tablets by mouth 3 times daily as needed for muscle spasms and pain.  This medication typically causes drowsiness.  It should not be taken and go to work.  I recommend be off work for 2 days.  I recommend follow-up with orthopedics on or before June 25.  Follow work restrictions between June 16 and June 25.  Return here to the emergency room if you have loss of bowel or bladder function or numbness in the pelvic area

## 2021-06-21 ENCOUNTER — OFFICE VISIT (OUTPATIENT)
Dept: URGENT CARE | Facility: URGENT CARE | Age: 26
End: 2021-06-21
Payer: COMMERCIAL

## 2021-06-21 ENCOUNTER — OFFICE VISIT (OUTPATIENT)
Dept: NEUROSURGERY | Facility: CLINIC | Age: 26
End: 2021-06-21
Payer: COMMERCIAL

## 2021-06-21 ENCOUNTER — ANCILLARY PROCEDURE (OUTPATIENT)
Dept: GENERAL RADIOLOGY | Facility: CLINIC | Age: 26
End: 2021-06-21
Attending: PHYSICIAN ASSISTANT
Payer: COMMERCIAL

## 2021-06-21 VITALS
BODY MASS INDEX: 33.55 KG/M2 | TEMPERATURE: 98.7 F | RESPIRATION RATE: 16 BRPM | DIASTOLIC BLOOD PRESSURE: 94 MMHG | HEIGHT: 61 IN | SYSTOLIC BLOOD PRESSURE: 130 MMHG | WEIGHT: 177.7 LBS | HEART RATE: 78 BPM | OXYGEN SATURATION: 100 %

## 2021-06-21 VITALS
SYSTOLIC BLOOD PRESSURE: 130 MMHG | WEIGHT: 177.7 LBS | DIASTOLIC BLOOD PRESSURE: 84 MMHG | BODY MASS INDEX: 33.55 KG/M2 | HEIGHT: 61 IN | TEMPERATURE: 97.6 F

## 2021-06-21 DIAGNOSIS — M54.41 ACUTE BILATERAL LOW BACK PAIN WITH BILATERAL SCIATICA: Primary | ICD-10-CM

## 2021-06-21 DIAGNOSIS — M54.42 ACUTE BILATERAL LOW BACK PAIN WITH BILATERAL SCIATICA: Primary | ICD-10-CM

## 2021-06-21 DIAGNOSIS — M54.42 ACUTE BILATERAL LOW BACK PAIN WITH BILATERAL SCIATICA: ICD-10-CM

## 2021-06-21 DIAGNOSIS — M54.41 ACUTE BILATERAL LOW BACK PAIN WITH BILATERAL SCIATICA: ICD-10-CM

## 2021-06-21 PROCEDURE — 99204 OFFICE O/P NEW MOD 45 MIN: CPT | Performed by: PHYSICIAN ASSISTANT

## 2021-06-21 PROCEDURE — 99214 OFFICE O/P EST MOD 30 MIN: CPT | Performed by: PHYSICIAN ASSISTANT

## 2021-06-21 PROCEDURE — 72100 X-RAY EXAM L-S SPINE 2/3 VWS: CPT | Mod: TC | Performed by: RADIOLOGY

## 2021-06-21 RX ORDER — PREDNISONE 20 MG/1
60 TABLET ORAL DAILY
Qty: 15 TABLET | Refills: 0 | Status: SHIPPED | OUTPATIENT
Start: 2021-06-21 | End: 2021-06-26

## 2021-06-21 RX ORDER — ACETAMINOPHEN 500 MG
500-1000 TABLET ORAL EVERY 6 HOURS PRN
COMMUNITY
End: 2022-09-04

## 2021-06-21 RX ORDER — HYDROCODONE BITARTRATE AND ACETAMINOPHEN 5; 325 MG/1; MG/1
1 TABLET ORAL EVERY 6 HOURS PRN
Qty: 12 TABLET | Refills: 0 | Status: SHIPPED | OUTPATIENT
Start: 2021-06-21 | End: 2021-06-24

## 2021-06-21 RX ORDER — CYCLOBENZAPRINE HCL 10 MG
10 TABLET ORAL 3 TIMES DAILY PRN
Qty: 30 TABLET | Refills: 0 | Status: SHIPPED | OUTPATIENT
Start: 2021-06-21 | End: 2021-07-09

## 2021-06-21 ASSESSMENT — PAIN SCALES - GENERAL
PAINLEVEL: MODERATE PAIN (5)
PAINLEVEL: MODERATE PAIN (5)

## 2021-06-21 ASSESSMENT — MIFFLIN-ST. JEOR
SCORE: 1477.38
SCORE: 1477.38

## 2021-06-21 NOTE — LETTER
"    6/21/2021         RE: Laura Gaston  [address] [place] [address]        Dear Colleague,    Thank you for referring your patient, Laura Gaston, to the [facility] NEUROSURGERY CLINIC [place]. Please see a copy of my visit note below.    Laura Gaston is a 26 year old female who presents for:  Chief Complaint   Patient presents with     Neurologic Problem     Acute low back pain         Initial Vitals:  /84   Temp 97.6  F (36.4  C) (Temporal)   Ht 5' 1.25\" (1.556 m)   Wt 177 lb 11.2 oz (80.6 kg)   BMI 33.30 kg/m   Estimated body mass index is 33.3 kg/m  as calculated from the following:    Height as of this encounter: 5' 1.25\" (1.556 m).    Weight as of this encounter: 177 lb 11.2 oz (80.6 kg).. Body surface area is 1.87 meters squared. BP completed using cuff size: regular  Moderate Pain (5)    Nursing Comments:     Elizabeth Palumbo [facility]      Dr. Etienne Barth  [facility]  Neurosurgery Clinic Visit      CC: back pain    Primary care Provider: [facility]    Referring Provider:  Mckayla Plascencia CNP      Reason For Visit:   I was asked to consult on the patient for back pain.      HPI: Laura Gaston is a 26 year old female who presents for evaluation of her chief complaint of acute midline back pain.  She states that she has had coming up on 4 weeks now of severe, stabbing pain in her low back.  Pain is localized in the L5 region, and she notes it to the right in the midline.  She has been trying to deal with this with just rests and some over-the-counter anti-inflammatories, but ultimately want to going to the ED.  She denies any bowel bladder changes, any problems of balance or coordination.  She denies any radicular pain or paresthesias.      Past Medical History reviewed with patient during visit.    Past Surgical History reviewed with patient during visit.    Current Outpatient Medications   Medication     buPROPion (WELLBUTRIN SR) " 150 MG 12 hr tablet     busPIRone (BUSPAR) 15 MG tablet     celecoxib (CELEBREX) 200 MG capsule     hydrOXYzine (ATARAX) 25 MG tablet     methylPREDNISolone (MEDROL DOSEPAK) 4 MG tablet therapy pack     norethindrone-ethinyl estradiol (MICROGESTIN) 1-20 MG-MCG tablet     tiZANidine (ZANAFLEX) 2 MG tablet     No current facility-administered medications for this visit.        Allergies   Allergen Reactions     Latex Hives     Precaution       Social History     Socioeconomic History     Marital status:      Spouse name: None     Number of children: None     Years of education: None     Highest education level: None   Occupational History     None   Social Needs     Financial resource strain: None     Food insecurity     Worry: None     Inability: None     Transportation needs     Medical: None     Non-medical: None   Tobacco Use     Smoking status: Current Every Day Smoker     Packs/day: 0.50     Types: Cigarettes     Smokeless tobacco: Never Used   Substance and Sexual Activity     Alcohol use: Yes     Comment: rare     Drug use: No     Sexual activity: Yes     Partners: Male     Birth control/protection: Pill   Lifestyle     Physical activity     Days per week: None     Minutes per session: None     Stress: None   Relationships     Social connections     Talks on phone: None     Gets together: None     Attends [employer] service: None     Active member of club or organization: None     Attends meetings of clubs or organizations: None     Relationship status: None     Intimate partner violence     Fear of current or ex partner: None     Emotionally abused: None     Physically abused: None     Forced sexual activity: None   Other Topics Concern     Parent/sibling w/ CABG, MI or angioplasty before 65F 55M? No   Social History Narrative     None       Family History   Problem Relation Age of Onset     Cardiovascular Maternal Grandmother 64        aortic aneur.     Psychotic Disorder Maternal Grandmother          "depression     Pancreatic Cancer Maternal Grandmother      Cancer - colorectal Maternal Grandfather      Psychotic Disorder Maternal Grandfather         depression     Psychotic Disorder Paternal Grandmother         depression     Psychotic Disorder Paternal Grandfather         depression          ROS: 10 point ROS neg other than the symptoms noted above in the HPI.    Vital Signs: /84   Temp 97.6  F (36.4  C) (Temporal)   Ht 5' 1.25\" (1.556 m)   Wt 177 lb 11.2 oz (80.6 kg)   BMI 33.30 kg/m      Examination:  Constitutional:  Alert, well nourished, NAD.  HEENT: Normocephalic, atraumatic.   Pulmonary:  Without shortness of breath, normal effort.   Lymph: no lymphadenopathy to low back or LE.   Integumentary: Skin is free of rashes or lesions.   Cardiovascular:  No pitting edema of BLE.    Psych: Normal affect, no apparent distress    Neurological:  Awake  Alert  Oriented x 3  Speech clear  Cranial nerves II - XII grossly intact  Motor exam   Hip Flexor:                Right: 5/5  Left:  5/5  Hip Adductor:             Right:  5/5  Left:  5/5  Hip Abductor:             Right:  5/5  Left:  5/5  Gastroc Soleus:        Right:  5/5  Left:  5/5  Tib/Ant:                      Right:  5/5  Left:  5/5  EHL:                          Right:  5/5  Left:  5/5       Sensation normal to bilateral upper and lower extremities.    Musculoskeletal:  Gait: Able to stand from a seated position. Normal non-antalgic, non-myelopathic gait.      Imaging:   na    Assessment/Plan:     Low back pain    Laura Gaston is a 26 year old female.  We did have a discussion regarding her symptoms.  She has almost 4 weeks of acute low back pain, with no particular event or injury.  She has no radicular pain or paresthesias.  She has no bowel or bladder issues.  We will start by getting some x-rays and get her set up with physical therapy.  We also put in a request for a lumbar spine MRI.  We will contact her when she has had a chance to do " some imaging to discuss the findings.  She voiced agreement and understanding.          Dom Sheppard PA-C  [facility] [place] [address]    Tel [phone]  Pager [phone]        Again, thank you for allowing me to participate in the care of your patient.        Sincerely,        Dom Sheppard PA-C

## 2021-06-21 NOTE — PROGRESS NOTES
Dr. Etienne Barth  [facility]  Neurosurgery Clinic Visit      CC: back pain    Primary care Provider: [facility]    Referring Provider:  Mckayla Plascencia CNP      Reason For Visit:   I was asked to consult on the patient for back pain.      HPI: Laura Gaston is a 26 year old female who presents for evaluation of her chief complaint of acute midline back pain.  She states that she has had coming up on 4 weeks now of severe, stabbing pain in her low back.  Pain is localized in the L5 region, and she notes it to the right in the midline.  She has been trying to deal with this with just rests and some over-the-counter anti-inflammatories, but ultimately want to going to the ED.  She denies any bowel bladder changes, any problems of balance or coordination.  She denies any radicular pain or paresthesias.      Past Medical History reviewed with patient during visit.    Past Surgical History reviewed with patient during visit.    Current Outpatient Medications   Medication     buPROPion (WELLBUTRIN SR) 150 MG 12 hr tablet     busPIRone (BUSPAR) 15 MG tablet     celecoxib (CELEBREX) 200 MG capsule     hydrOXYzine (ATARAX) 25 MG tablet     methylPREDNISolone (MEDROL DOSEPAK) 4 MG tablet therapy pack     norethindrone-ethinyl estradiol (MICROGESTIN) 1-20 MG-MCG tablet     tiZANidine (ZANAFLEX) 2 MG tablet     No current facility-administered medications for this visit.        Allergies   Allergen Reactions     Latex Hives     Precaution       Social History     Socioeconomic History     Marital status:      Spouse name: None     Number of children: None     Years of education: None     Highest education level: None   Occupational History     None   Social Needs     Financial resource strain: None     Food insecurity     Worry: None     Inability: None     Transportation needs     Medical: None     Non-medical: None   Tobacco Use     Smoking status: Current Every Day Smoker      "Packs/day: 0.50     Types: Cigarettes     Smokeless tobacco: Never Used   Substance and Sexual Activity     Alcohol use: Yes     Comment: rare     Drug use: No     Sexual activity: Yes     Partners: Male     Birth control/protection: Pill   Lifestyle     Physical activity     Days per week: None     Minutes per session: None     Stress: None   Relationships     Social connections     Talks on phone: None     Gets together: None     Attends Temple service: None     Active member of club or organization: None     Attends meetings of clubs or organizations: None     Relationship status: None     Intimate partner violence     Fear of current or ex partner: None     Emotionally abused: None     Physically abused: None     Forced sexual activity: None   Other Topics Concern     Parent/sibling w/ CABG, MI or angioplasty before 65F 55M? No   Social History Narrative     None       Family History   Problem Relation Age of Onset     Cardiovascular Maternal Grandmother 64        aortic aneur.     Psychotic Disorder Maternal Grandmother         depression     Pancreatic Cancer Maternal Grandmother      Cancer - colorectal Maternal Grandfather      Psychotic Disorder Maternal Grandfather         depression     Psychotic Disorder Paternal Grandmother         depression     Psychotic Disorder Paternal Grandfather         depression          ROS: 10 point ROS neg other than the symptoms noted above in the HPI.    Vital Signs: /84   Temp 97.6  F (36.4  C) (Temporal)   Ht 5' 1.25\" (1.556 m)   Wt 177 lb 11.2 oz (80.6 kg)   BMI 33.30 kg/m      Examination:  Constitutional:  Alert, well nourished, NAD.  HEENT: Normocephalic, atraumatic.   Pulmonary:  Without shortness of breath, normal effort.   Lymph: no lymphadenopathy to low back or LE.   Integumentary: Skin is free of rashes or lesions.   Cardiovascular:  No pitting edema of BLE.    Psych: Normal affect, no apparent distress    Neurological:  Awake  Alert  Oriented x " 3  Speech clear  Cranial nerves II - XII grossly intact  Motor exam   Hip Flexor:                Right: 5/5  Left:  5/5  Hip Adductor:             Right:  5/5  Left:  5/5  Hip Abductor:             Right:  5/5  Left:  5/5  Gastroc Soleus:        Right:  5/5  Left:  5/5  Tib/Ant:                      Right:  5/5  Left:  5/5  EHL:                          Right:  5/5  Left:  5/5       Sensation normal to bilateral upper and lower extremities.    Musculoskeletal:  Gait: Able to stand from a seated position. Normal non-antalgic, non-myelopathic gait.      Imaging:   na    Assessment/Plan:     Low back pain    Laura Gaston is a 26 year old female.  We did have a discussion regarding her symptoms.  She has almost 4 weeks of acute low back pain, with no particular event or injury.  She has no radicular pain or paresthesias.  She has no bowel or bladder issues.  We will start by getting some x-rays and get her set up with physical therapy.  We also put in a request for a lumbar spine MRI.  We will contact her when she has had a chance to do some imaging to discuss the findings.  She voiced agreement and understanding.          Dom Sheppard PA-C  [facility] [place] [address]    Tel [phone]  Pager [phone]

## 2021-06-21 NOTE — PROGRESS NOTES
"Laura Gaston is a 26 year old female who presents for:  Chief Complaint   Patient presents with     Neurologic Problem     Acute low back pain         Initial Vitals:  /84   Temp 97.6  F (36.4  C) (Temporal)   Ht 5' 1.25\" (1.556 m)   Wt 177 lb 11.2 oz (80.6 kg)   BMI 33.30 kg/m   Estimated body mass index is 33.3 kg/m  as calculated from the following:    Height as of this encounter: 5' 1.25\" (1.556 m).    Weight as of this encounter: 177 lb 11.2 oz (80.6 kg).. Body surface area is 1.87 meters squared. BP completed using cuff size: regular  Moderate Pain (5)    Nursing Comments:     Elizabeth Palumbo CMA    "

## 2021-06-21 NOTE — PROGRESS NOTES
Assessment & Plan     Acute bilateral low back pain with bilateral sciatica  Will treat with predniSONE (DELTASONE) 20 MG tablet; Take 3 tablets (60 mg) by mouth daily for 5 days, cyclobenzaprine (FLEXERIL) 10 MG tablet; Take 1 tablet (10 mg) by mouth 3 times daily as needed for muscle spasms, and HYDROcodone-acetaminophen (NORCO) 5-325 MG tablet; Take 1 tablet by mouth every 6 hours as needed for pain. Discussed how to take these medications and what to expect. Avoid aggravating factors but encouraged gentle stretching/ROM. Return to clinic if symptoms worsen or do not improve; otherwise follow up as needed                   Return in about 1 week (around 6/28/2021) for Follow up.    Kathleen Sanchez PA-C  [facility]    Subjective   Chief Complaint   Patient presents with     Back Pain     4 Weeks Patient is having low back pain in the middle of her back that is stabbing, saw specialty care today the medications she's on aren't helping they didn't do anything today sent her to [facility], she needs a workability form for work.         HPI     Back Pain    Onset of symptoms was 4 week(s) ago.  Location: center of back  Radiation: does not radiate  Context:       The injury happened while at home      Mechanism: none recalled by the patient      Patient experienced delayed pain and then 1 week ago twisted the wrong way and pain worsens   Course of symptoms is worsening.    Severity moderate  Current and Associated symptoms: pain  Denies: fecal incontinence, urinary incontinence, lower extremity numbness, lower extremity weakness and paresthesia    Aggravating Factors: lifting, bending and changing position  Therapies to improve symptoms include: celebrex two times daily , tylenol 500mg every 8 hours, tizanidine not helpful, medrol dose pack not helpful  Past history: no prior back problems              Review of Systems   Constitutional, HEENT, cardiovascular, pulmonary, gi and gu  "systems are negative, except as otherwise noted.      Objective    BP (!) 130/94 (BP Location: Right arm, Patient Position: Sitting, Cuff Size: Adult Regular)   Pulse 78   Temp 98.7  F (37.1  C) (Tympanic)   Resp 16   Ht 1.556 m (5' 1.25\")   Wt 80.6 kg (177 lb 11.2 oz)   SpO2 100%   BMI 33.30 kg/m    Body mass index is 33.3 kg/m .  Physical Exam  Constitutional:       Comments: No acute distress but appears uncomfortable.    Musculoskeletal:      Comments: No obvious deformity, erythema, edema, or ecchymosis of the thoracic or lumbar spine. Tenderness with palpation of bilateral low back, SI joints, and surrounding musculature. Non-tender internal and external rotation of the hips. 2+ DTR s, lower extremity CMS intact.      Skin:     General: Skin is warm and dry.   Neurological:      Mental Status: She is alert.   Psychiatric:         Mood and Affect: Mood normal.         Speech: Speech normal.         Behavior: Behavior normal.         Thought Content: Thought content normal.                        "

## 2021-06-21 NOTE — LETTER
[facility] [place]  [address] [address]  Phone: [phone]  Fax: [phone]    June 21, 2021        [place] [address]          To whom it may concern:    RE: Laura Gaston    Patient was seen and treated today at our clinic and missed work 06/22/21 and 06/23/21.Patient may return to work 06/24/21 with the following:  Light duty-unable to lift more than 10lbs pounds. Bend: Not at all (0 hours). Squat: Not at all (0 hours). Follow up with primary care provider in 1 week.       Please contact me for questions or concerns.      Sincerely,        Kathleen Sanchez PA-C

## 2021-06-22 ENCOUNTER — HOSPITAL ENCOUNTER (OUTPATIENT)
Dept: MRI IMAGING | Facility: CLINIC | Age: 26
Discharge: HOME | End: 2021-06-22
Attending: PHYSICIAN ASSISTANT | Admitting: PHYSICIAN ASSISTANT
Payer: COMMERCIAL

## 2021-06-22 DIAGNOSIS — M54.41 ACUTE BILATERAL LOW BACK PAIN WITH BILATERAL SCIATICA: ICD-10-CM

## 2021-06-22 DIAGNOSIS — M54.42 ACUTE BILATERAL LOW BACK PAIN WITH BILATERAL SCIATICA: ICD-10-CM

## 2021-06-22 PROCEDURE — 72148 MRI LUMBAR SPINE W/O DYE: CPT

## 2021-06-28 ENCOUNTER — TELEPHONE (OUTPATIENT)
Dept: NEUROSURGERY | Facility: CLINIC | Age: 26
End: 2021-06-28

## 2021-06-28 DIAGNOSIS — M54.41 ACUTE BILATERAL LOW BACK PAIN WITH BILATERAL SCIATICA: Primary | ICD-10-CM

## 2021-06-28 DIAGNOSIS — M54.42 ACUTE BILATERAL LOW BACK PAIN WITH BILATERAL SCIATICA: Primary | ICD-10-CM

## 2021-06-28 NOTE — TELEPHONE ENCOUNTER
Dom Sheppard PA-C reviewed lumbar MRI. Per August: Her MRI is normal, no pathology noted. Would recommend PT and pain clinic, if she would like.     Called patient and reviewed above results and recommendation per August. Patient verbalized understanding and in agreement with plan. Order placed for both PT and [facility].

## 2021-06-30 ENCOUNTER — HOSPITAL ENCOUNTER (OUTPATIENT)
Dept: PHYSICAL THERAPY | Facility: CLINIC | Age: 26
Setting detail: THERAPIES SERIES
End: 2021-06-30
Attending: PHYSICIAN ASSISTANT
Payer: COMMERCIAL

## 2021-06-30 DIAGNOSIS — M54.42 ACUTE BILATERAL LOW BACK PAIN WITH BILATERAL SCIATICA: ICD-10-CM

## 2021-06-30 DIAGNOSIS — M54.41 ACUTE BILATERAL LOW BACK PAIN WITH BILATERAL SCIATICA: ICD-10-CM

## 2021-06-30 PROCEDURE — 97161 PT EVAL LOW COMPLEX 20 MIN: CPT | Mod: GP | Performed by: PHYSICAL MEDICINE & REHABILITATION

## 2021-06-30 PROCEDURE — 97110 THERAPEUTIC EXERCISES: CPT | Mod: GP | Performed by: PHYSICAL MEDICINE & REHABILITATION

## 2021-06-30 PROCEDURE — 97140 MANUAL THERAPY 1/> REGIONS: CPT | Mod: GP | Performed by: PHYSICAL MEDICINE & REHABILITATION

## 2021-06-30 NOTE — PROGRESS NOTES
06/30/21 1100   General Information   Type of Visit Initial OP Ortho PT Evaluation   Start of Care Date 06/30/21   Referring Physician Dom Sheppard, VIVIANA   Patient/Family Goals Statement improve pain   Orders Evaluate and Treat   Date of Order 06/28/21   Certification Required? No  (HP)   Medical Diagnosis Acute bilateral low back pain with bilateral sciatica   Surgical/Medical history reviewed Yes   Precautions/Limitations no known precautions/limitations   General Information Comments PMHx per pt report: smoking   Body Part(s)   Body Part(s) Lumbar Spine/SI   Presentation and Etiology   Pertinent history of current problem (include personal factors and/or comorbidities that impact the POC) Pt arrived to PT today for acute LBP that started 5-6 weeks ago. Notes no real KRISTINE, just noticed back felt sore one day. Noticed pain getting out of the car. Pain progressively got worse, notes at work she fell to her knees due to pain. Pain did cause pt to go to . Notes pain is less sharp now but states still present with prolonged sitting, standing or walking. Notes she saw specialist but was not helpful. Pain does radiate into B buttocks. However, pain mostly located in middle of spine.    Impairments A. Pain;E. Decreased flexibility;H. Impaired gait   Functional Limitations perform activities of daily living;perform required work activities;perform desired leisure / sports activities   Symptom Location low back and buttocks   How/Where did it occur From insidious onset   Onset date of current episode/exacerbation 06/14/21  (date of UC visit. pain started ~5-6 weeks ago)   Chronicity New   Pain rating (0-10 point scale) Best (/10);Worst (/10)   Best (/10) 2   Worst (/10) 9   Pain quality A. Sharp;B. Dull;C. Aching   Frequency of pain/symptoms D. Other   Pain frequency comment dull aching all the time, sharp 5x/week   Pain/symptoms are: Worse during the day   Pain/symptoms exacerbated by A. Sitting;B. Walking;D.  Carrying;I. Bending   Pain/symptoms eased by C. Rest;E. Changing positions   Progression of symptoms since onset: Improved   Current / Previous Interventions   Diagnostic Tests: X-ray;MRI   X-ray Results unremarkable   MRI Results unremarkable   Prior Level of Function   Prior Level of Function-Mobility independent   Prior Level of Function-ADLs independent   Current Level of Function   Current Community Support Family/friend caregiver   Patient role/employment history A. Employed   Employment Comments manages holiday   Living environment House/townhome   Home/community accessibility notes a couple stairs at home with railing   Current equipment-Gait/Locomotion None   Fall Risk Screen   Fall screen completed by PT   Have you fallen 2 or more times in the past year? No   Have you fallen and had an injury in the past year? No   Is patient a fall risk? No   Abuse Screen (yes response referral indicated)   Feels Unsafe at Home or Work/School no   Feels Threatened by Someone no   Does Anyone Try to Keep You From Having Contact with Others or Doing Things Outside Your Home? no   Physical Signs of Abuse Present no   System Outcome Measures   Outcome Measures Low Back Pain (see Oswestry and Paulina)   Lumbar Spine/SI Objective Findings   Observation increased time for transfers due to pain   Integumentary no palapble swelling   Posture unremarkable   Gait/Locomotion decreased kenisha   Flexion ROM 1.5' from floor, pain   Extension ROM 75% limetid, pain   Right Side Bending ROM inferior patella   Left Side Bending ROM inferior patella   Repeated Extension-Standing ROM pos   Repeated Flexion-Standing ROM pos   Lumbar ROM Comment rotation B: wnl   Pelvic Screen level innominates   Hip Screen neg ROLAN B, pos scour B   Hip Flexion (L2) Strength 5-/5 B   Hip Abduction Strength seated: 5/5 B   Hip Adduction Strength seated: 5/5 B   Hip Extension Strength able to perform supine bridge, pain   Knee Flexion Strength 5/5 B   Knee  Extension (L3) Strength 5/5 B   Ankle Dorsiflexion (L4) Strength 5/5 B   Great Toe Extension (L5) Strength 5/5 B   Ankle Plantar Flexion (S1) Strength 5/5 B   Lumbar/Hip/Knee/Foot Strength Comments Hip IR and ER B: 5-/5 each B   Hamstring Flexibility 85* B   Hip Flexor Flexibility wnl B   Quadricep Flexibility wnl B   Piriformis Flexibility slight limitation B   SLR neg B   Mp Test normal   Prone Instability Test pos L2 and L5   Crossover SLR neg B   Repeated Extension Prone pos   Slump Test neg   Spring Test pos L2 and L5   Lumbar/SI Special Tests Comments noted decreased pain with distractioni   Segmental Mobility normal   Sensation Testing normal dull touch sensation amongst B LE   Palpation noted pain throughout lumbar paraspinals, L2 and L5 spinous processes   Planned Therapy Interventions   Planned Therapy Interventions joint mobilization;manual therapy;motor coordination training;neuromuscular re-education;ROM;stretching;strengthening;transfer training   Planned Modality Interventions   Planned Modality Interventions Cryotherapy;Electrical stimulation;Hot packs;TENS;Traction;Ultrasound   Planned Modality Interventions Comments only as needed   Clinical Impression   Criteria for Skilled Therapeutic Interventions Met yes, treatment indicated   PT Diagnosis acute LBP   Influenced by the following impairments decreased ROM, decreased strength, pain   Functional limitations due to impairments sitting, standing, walking   Clinical Presentation Stable/Uncomplicated   Clinical Presentation Rationale age, PLOF, high motivation, comorbidities, FARHAN, valentina, clinical judgement   Clinical Decision Making (Complexity) Low complexity   Therapy Frequency 1 time/week   Predicted Duration of Therapy Intervention (days/wks) 8 weeks   Risk & Benefits of therapy have been explained Yes   Patient, Family & other staff in agreement with plan of care Yes   Clinical Impression Comments Pt is a 26 y.o. female who presented to the  PT clinic today with a rehab diagnosis of acute LBP as evidenced by decreased ROM, decresaed strength, and pain. More specifically, presenting with sx consistent with stenosis and spinal instability. Pt is appropriate for skilled PT to address previously listed impairments in order to decrease difficulty with sitting, standing and walking.    Education Assessment   Preferred Learning Style Listening;Reading;Demonstration;Pictures/video   Barriers to Learning No barriers   ORTHO GOALS   PT Ortho Eval Goals 1;2;3;4   Ortho Goal 1   Goal Identifier 1   Goal Description Pt will be able to sit >1 hr in order to decrease difficulty with riding in car   Target Date 07/14/21   Ortho Goal 2   Goal Identifier 2   Goal Description Pt will be able to stand >30 min in order to decrease difficulty with work.    Target Date 07/28/21   Ortho Goal 3   Goal Identifier 3   Goal Description Pt will improve FARHAN by 20% indicating decreased difficulty with ADLs.    Target Date 08/11/21   Ortho Goal 4   Goal Identifier 4   Goal Description Pt will be independent with HEP in order to self manage symptoms.    Target Date 08/27/21   Total Evaluation Time   PT Parth, Low Complexity Minutes (82921) 24       Please contact me with any questions or concerns.    Thank you for your referral,     Felicia Morrison, PT, DPT  Physical Therapist  [address] [address] [phone]

## 2021-07-01 NOTE — PROGRESS NOTES
"[facility] Consultation      This patient is being seen in consultation at the request of her provider Dom Sheppard PA-C.    Primary Care Provider is [facility].    Please see the [facility] health questionnaire which the patient completed and reviewed with me in detail    CHIEF COMPLAINT:  Low back pain    HISTORY OF PRESENT ILLNESS:  Laura Gaston is a 26 year old female with history of low back pain.    She states that her pain started about 6 weeks ago. She states that she went home after work and noticed that her back hurt. About 2-3 weeks ago she states that she turned wrong and had a stabbing pain in her low back and it took her breath away. She states that it happened again and she fell to her knees. She went to ER and got Tizanidine and Celebrex. She got a referral to specialty care. She then saw neurosurgery. She was told to get an Xray and then was told to leave. She states that she was also told an MRI wouldn't be done due to age. She then went to Urgent Care. She was told that they didn't know what was wrong, but she was given Prednisone, Vicodin and Flexeril. She then got a call to schedule her MRI and was able to have this done the next day. She states that she got a call the following week and was told that her MRI was normal and she should go to PT and pain management.     She states that the pain goes into her buttocks. She states that she feels the pain is radiating into her buttocks from her low back. She denies any loss of bladder or bowel control.        Pain Information:   Pain quality: \"always aching with bouts of stabbing\"    Pain timing: at night     Pain rating: intensity ranges from 2/10 to 9/10, and averages 3-4/10 on a 0-10 scale.   Aggravating factors include: same position for too long, bending   Relieving factors include: laying flat, being in a pool      Past Pain Treatments:   Pain Clinic:   No   PT: Yes, in " "currently  Psychologist: No  Relaxation techniques/biofeedback: No  Chiropractor: No  Acupuncture: No  Pharmacotherapy:    Opioids: Yes     Non-opioids:  Yes   TENs Unit:No  Injections: No  Surgeries related to pain: No     Current Pain Relevant Medications:    Tylenol 500mg-Takes 2 pills every 8 hours  Celebrex 200mg: takes twice daily    Other:  Wellbutrin  Buspar  Atarax       Previous Pain Relevant Medications: (H--helped; HI--Helped initially; [facility]--Somewhat helpful; NH--No help; W--worse; SE--side effects; ?--Unsure if helpful)   NOTE: This medication information taken from patient's intake form, not medical records.   Opiates: Hydrocodone [facility] \"took the edge off\"  NSAIDS: Celebrex ?,  Ibuprofen ?, Aleve ?  Anti-migraine mediations: none  Muscle Relaxants: Flexeril [facility] \"took the edge off\", Tizanidine [facility]  Neuropathics: none  Anti-depressants: none  Topicals: Tiger Balm [facility], Menthol topical SW  Other medications not covered above: Prednisone ?        PAST MEDICAL HISTORY:   History reviewed. No pertinent past medical history.      HEALTH AND LIFESTYLE PRACTICES:  Have you ever had any problems with alcohol or drug use? no  Have you ever felt you should cut down your use of alcohol/drugs?no  Have people ever annoyed you by criticizing your alcohol/drug use? no  Have you ever felt bad or guilty about your alcohol/drug use? no  Have you ever had a drink or taken a drug first thing in the morning for an eye-opener/hangover? no      ALLERGIES:  Allergies   Allergen Reactions     Latex Hives     Precaution       MEDICATIONS:  Current Outpatient Medications   Medication Sig Dispense Refill     acetaminophen (TYLENOL) 500 MG tablet Take 500-1,000 mg by mouth every 6 hours as needed for mild pain       busPIRone (BUSPAR) 15 MG tablet Take 1 tablet (15 mg) by mouth 2 times daily 180 tablet 1     cyclobenzaprine (FLEXERIL) 10 MG tablet Take 1 tablet (10 mg) by mouth 3 times daily as needed for muscle spasms 30 tablet 0     " "hydrOXYzine (ATARAX) 25 MG tablet Take 1 tablet (25 mg) by mouth 3 times daily as needed for anxiety or other (insomnia) 30 tablet 0     norethindrone-ethinyl estradiol (MICROGESTIN) 1-20 MG-MCG tablet daily       buPROPion (WELLBUTRIN SR) 150 MG 12 hr tablet Take 1 tablet (150 mg) by mouth 2 times daily (Patient not taking: Reported on 7/9/2021) 180 tablet 1     celecoxib (CELEBREX) 200 MG capsule Take 1 capsule (200 mg) by mouth 2 times daily for 14 days 28 capsule 0         CURRENT FAMILY/SOCIAL SITUATION:  Living situation: Patient is . She lives with her .  Support system: She reports having a good support system  Occupation: she works 50+ hours/week as a     SUBSTANCE USE:  Any illicit drug use: marijuana many years ago  EtOH use: weekly use  Caffeine use: daily use  Nicotine use: daily use, smokes 1/2 ppd  Any use of prescriptions other than how they were prescribed:none    PHYSICAL EXAM    Vitals: BP (!) 144/80   Temp 98.8  F (37.1  C) (Temporal)   Ht 1.549 m (5' 1\")   Wt 80.3 kg (177 lb)   BMI 33.44 kg/m        Appearance:     A&O. Patient is appropriate.   Patient is in NAD.   Patient is well groomed and appears stated age.     HEENT:   Normocephalic, atraumatic.   Respiratory: No shortness of breath with conversation  Skin:  No rashes, erythema, breakdowns, lesions to exposed skin.   Hematologic:  No bruises, petechiae or ecchymosis to exposed areas.  Psychiatric:  mentation appears normal., affect and mood normal  Musculoskeletal:  Posture upright, shoulders and pelvis are leveled.   Hip flexion: R: 5/5 L: 5/5  Knee ext: R: 5/5 L: 5/5  Knee flex: R: 5/5 L: 5/5  Dorsiflexion: R: 5/5 L: 5/5  Plantarflexion: R: 5/5 L: 5/5    Cervical spine:   Tenderness in the cervical spine at midline. No   Tenderness in the cervical paraspinal muscles. No  Thoracic spine:    Tenderness in the thoracic spine at midline. No   Tenderness in the thoracic paraspinal muscles. No  Lumbar/Sacral " spine:   Flexion:  30 degrees, painful    Ext: 35 degrees, painful    Rotation/ext to right: painful    Rotation/ext to left: painful    Tenderness in the lumbar spine at midline. Yes   Tenderness in the lumbar paraspinal muscles. Yes   Straight leg exam:    Right: negative    Left: negative   Jasmeet/Cole's test:      Right: negative     Left:  negative   Tenderness over SI joint:      Right: negative     Left:  negative   Tenderness over piriformis:     Right: negative    Left:  negative   Tenderness over Trochanteric Bursa:     Right: negative    Left: negative    Gait pattern:  Able to walk on the heels and toes. Patient has a normal gait    Neurological:   Deep Tendon Reflex exam:     Patella:  R:  1/4   L: 1/4   Achilles:  R:  2/4   L: 2/4    Sensory exam:   Light touch: normal bilateral upper and lower extremities    Vibration: normal in bilateral upper extremities and bilateral lower extremities   Sharp: normal in bilateral upper extremities and bilateral lower extremities   No allodynia, dysesthesia, or hyperalgesia.      Previous Diagnostic Tests:   Imaging Studies:   MRI Lumbar Spine 6/22/21  IMPRESSION:    1. No evidence of disc herniation.  2. No stenoses.      [place] Board of Pharmacy Data Base Reviewed:    YES; No concern for abuse or misuse of controlled medications based on this report. Checked 7/9/21      ASSESSMENT:   Laura Gaston is a 26 year old female who presents today with the complaints of:    1. Low back pain  2. Myofascial pain      We discussed a multidisciplinary approach to pain management today.  This included physical therapy, behavioral health, medication management, and injection therapy.      We did discuss that she does have options including trying a long-acting anti-inflammatory.  She can continue the muscle relaxant.  Discussed that we can try trigger point injections to help calm down the muscle tension, spasms, tightness.  She would like to try this today.  She should  continue with physical therapy as scheduled.      PLAN:    Diagnosis reviewed, treatment option addressed, and risk/benefits discussed.  Self-care instructions given.  I am recommending a multidisciplinary treatment plan to help this patient better manage her pain.       1. Physical Therapy:  Continue current plan  2. Clinical Health Psychologist to address issues of relaxation, behavorial change, coping style, and other factors important to improvement: not at this time  3. Diagnostic Studies:  None at this time  4. Medication Management:      Continue Flexeril 10m tab daily as needed    Stop Celebrex     Start Meloxicam 15mg daily. Take with food. Avoid other antiinflammatories.   5. Potential procedures: trigger point injections today, see below  6. Other treatments: consider massage therapy and chiropractic care   7. Recommendations to PCP: see above    Follow up: 6-8 Weeks     TIME SPENT:   The total TIME spent on this patient on the day of the appointment was 53 minutes.    Time spent preparing to see the patient (reviewing records and tests) 4 minutes  Time spend face to face with the patient 26 minutes  Time spent ordering tests, medications, procedures and referrals 0 minutes  Time spent preparing and performing procedure: 11  Time spent Referring and communicating with other healthcare professionals 0 minutes  Time spent documenting clinical information in Epic 12 minutes      I would like to thank Dom Sheppard PA-C for allowing me to participate in the management of this patient.     Jalyn Osuna PA-C  [facility]      Pre Procedure Diagnosis: myofascial pain  Post procedure Diagnosis: Same  Procedure performed: trigger point injections  Anesthesia: none  Complications: none  Operators: Jalyn Osuna PA-C     Indications:   Laura Gaston is a 26 year old female with a history of low back pain.  Exam shows myofascial pain of the muscle groups listed below and  they have tried conservative treatment including medications and physical therapy.    Options/alternatives, benefits and risks were discussed with the patient including bleeding, infection, tissue trauma and pnuemothorax.  Questions were answered to her satisfaction and she agrees to proceed. Voluntary informed consent was obtained and signed.     Vitals were reviewed: Yes  Allergies were reviewed:  Yes   Medications were reviewed:  Yes   Pre-procedure pain score: 2.5/10    Procedure:  After getting informed consent, a Pause for the Cause was performed.    Trigger points were identified by patient, and marked when appropriate.  The area was prepped with Chloroprep.    Using clean technique, injections were completed using a 25G, 1.5 inch needle.  After negative aspiration, injection was completed.  A total of 6 locations were injected.  When possible, tissue was retracted from the chest wall to avoid lung injury.    Muscle groups injected:  Bilateral lumbar paraspinals    Injection solution contained:  4ml of 1% lidocaine and 4ml of 0.5% bupivacaine.    Hemostasis was achieved, the area was cleaned, and bandaids were placed when appropriate.  The patient tolerated the procedure well.  No shortness of breath with conversation    Post-procedure pain score: 2/10  Follow-up includes:   -repeat every 6 weeksaravind Osuna [facility]       No

## 2021-07-07 ENCOUNTER — HOSPITAL ENCOUNTER (OUTPATIENT)
Dept: PHYSICAL THERAPY | Facility: CLINIC | Age: 26
Setting detail: THERAPIES SERIES
End: 2021-07-07
Attending: PHYSICIAN ASSISTANT
Payer: COMMERCIAL

## 2021-07-07 PROCEDURE — 97110 THERAPEUTIC EXERCISES: CPT | Mod: GP | Performed by: PHYSICAL MEDICINE & REHABILITATION

## 2021-07-07 PROCEDURE — 97012 MECHANICAL TRACTION THERAPY: CPT | Mod: GP | Performed by: PHYSICAL MEDICINE & REHABILITATION

## 2021-07-09 ENCOUNTER — OFFICE VISIT (OUTPATIENT)
Dept: PALLIATIVE MEDICINE | Facility: CLINIC | Age: 26
End: 2021-07-09
Attending: PHYSICIAN ASSISTANT
Payer: COMMERCIAL

## 2021-07-09 VITALS
WEIGHT: 177 LBS | BODY MASS INDEX: 33.42 KG/M2 | SYSTOLIC BLOOD PRESSURE: 144 MMHG | DIASTOLIC BLOOD PRESSURE: 80 MMHG | TEMPERATURE: 98.8 F | HEIGHT: 61 IN

## 2021-07-09 DIAGNOSIS — M54.42 ACUTE BILATERAL LOW BACK PAIN WITH BILATERAL SCIATICA: ICD-10-CM

## 2021-07-09 DIAGNOSIS — M54.50 ACUTE MIDLINE LOW BACK PAIN WITHOUT SCIATICA: ICD-10-CM

## 2021-07-09 DIAGNOSIS — M79.18 MYOFASCIAL PAIN: Primary | ICD-10-CM

## 2021-07-09 DIAGNOSIS — M54.41 ACUTE BILATERAL LOW BACK PAIN WITH BILATERAL SCIATICA: ICD-10-CM

## 2021-07-09 PROCEDURE — 20552 NJX 1/MLT TRIGGER POINT 1/2: CPT | Performed by: PHYSICIAN ASSISTANT

## 2021-07-09 PROCEDURE — 99215 OFFICE O/P EST HI 40 MIN: CPT | Mod: 25 | Performed by: PHYSICIAN ASSISTANT

## 2021-07-09 RX ORDER — BUPIVACAINE HYDROCHLORIDE 5 MG/ML
4 INJECTION, SOLUTION PERINEURAL ONCE
Status: COMPLETED | OUTPATIENT
Start: 2021-07-09 | End: 2021-07-09

## 2021-07-09 RX ORDER — MELOXICAM 15 MG/1
15 TABLET ORAL DAILY
Qty: 30 TABLET | Refills: 0 | Status: SHIPPED | OUTPATIENT
Start: 2021-07-09 | End: 2021-10-05

## 2021-07-09 RX ORDER — CYCLOBENZAPRINE HCL 10 MG
10 TABLET ORAL 3 TIMES DAILY PRN
Qty: 30 TABLET | Refills: 3 | Status: SHIPPED | OUTPATIENT
Start: 2021-07-09 | End: 2022-09-04

## 2021-07-09 RX ADMIN — BUPIVACAINE HYDROCHLORIDE 20 MG: 5 INJECTION, SOLUTION PERINEURAL at 08:56

## 2021-07-09 ASSESSMENT — MIFFLIN-ST. JEOR: SCORE: 1470.25

## 2021-07-09 ASSESSMENT — PAIN SCALES - GENERAL: PAINLEVEL: MILD PAIN (3)

## 2021-07-09 NOTE — PATIENT INSTRUCTIONS
After Visit Instructions:     Thank you for coming to [facility] for your care. It is my goal to partner with you to help you reach your optimal state of health.     I am recommending multidisciplinary care at this time.  The focus of care will be to continue gradual rehabilitation and pain management with medication adjustments as needed.    Continue daily self-care, identifying contributing factors, and monitoring variations in pain level. Continue to integrate self-care into your life.        Physical therapy assessment/visit: continue current plan    Schedule follow-up with Jalyn Osuna PA-C in 6-8 weeks. You will need to make this appointment.     Procedures recommended: trigger point injections today, see below     Referrals: consider massage and chiropractic care    Medication recommendations:     Continue Flexeril 10m tab daily as needed    Stop Celebrex     Start Meloxicam 15mg daily. Take with food. Avoid other antiinflammatories.       Jalyn Osuna PA-C  [facility]  [facility]    Contact information: [facility]  Clinic Number:  [phone]     Call with any questions about your care and for scheduling assistance.     Calls are returned Monday through Friday between 8 AM and 4:30 PM. We usually get back to you within 2 business days depending on the issue/request.    If we are prescribing your medications:    For opioid medication refills, call the clinic or send a [employer] message 7 days in advance.  Please include:    Name of requested medication    Name of the pharmacy.    For non-opioid medications, call your pharmacy directly to request a refill. Please allow 3-4 days to be processed.     Per [place] State Law:    All controlled substance prescriptions must be filled within 30 days of being written.      For those controlled substances allowing refills, pickup must occur within 30 days of  last fill.      We believe regular attendance is key to your success in our program!      Any time you are unable to keep your appointment we ask that you call us at least 24 hours in advance to cancel.This will allow us to offer the appointment time to another patient.   Multiple missed appointments may lead to dismissal from the clinic.    [facility]   Post Procedure Instructions    Today you had:  trigger point injections       Medications used:  lidocaine   bupivicaine           Go to the emergency room if you develop any shortness of breath    Monitor the injection sites for signs and symptoms of infection-fever, chills, redness, swelling, warmth, or drainage to areas.    You may have soreness at injection sites for up to 24 hours.    You may apply ice to the painful areas to help minimize the discomfort of the needle pokes.    Do not apply heat to sites for at least 12 hours.    You may use anti-inflammatory medications or Tylenol for pain control if necessary    Pain Clinic phone number during work hours Monday-Friday:  [phone]    After hours provider line: [phone]

## 2021-07-14 ENCOUNTER — HOSPITAL ENCOUNTER (OUTPATIENT)
Dept: PHYSICAL THERAPY | Facility: CLINIC | Age: 26
Setting detail: THERAPIES SERIES
End: 2021-07-14
Attending: PHYSICIAN ASSISTANT
Payer: COMMERCIAL

## 2021-07-14 PROCEDURE — 97012 MECHANICAL TRACTION THERAPY: CPT | Mod: GP | Performed by: PHYSICAL MEDICINE & REHABILITATION

## 2021-07-14 PROCEDURE — 97110 THERAPEUTIC EXERCISES: CPT | Mod: GP | Performed by: PHYSICAL MEDICINE & REHABILITATION

## 2021-09-19 ENCOUNTER — HEALTH MAINTENANCE LETTER (OUTPATIENT)
Age: 26
End: 2021-09-19

## 2021-09-29 NOTE — PROGRESS NOTES
Outpatient Physical Therapy Discharge Note     Patient: Laura Gaston  : 1995    Beginning/End Dates of Reporting Period:  21 to 21    Referring Provider: Dom Sheppard PA-C    Therapy Diagnosis: acute LBP    Patient did not return for follow up treatments as directed.  Goal status and current objective information is therefore unknown.  Discharge from PT services at this time for this episode of treatment. Please see attached documentation under this episode of care for further information including dates of service, start of care date, referring physician, Dx, treatment plan, treatments, etc.    Please contact me with any questions or concerns.    Thank you for your referral.    Felicia Morrison, PT, DPT  Physical Therapist  [address] [address] [phone]

## 2021-10-05 ENCOUNTER — VIRTUAL VISIT (OUTPATIENT)
Dept: FAMILY MEDICINE | Facility: CLINIC | Age: 26
End: 2021-10-05
Payer: COMMERCIAL

## 2021-10-05 DIAGNOSIS — F41.1 GAD (GENERALIZED ANXIETY DISORDER): Primary | ICD-10-CM

## 2021-10-05 PROCEDURE — 99214 OFFICE O/P EST MOD 30 MIN: CPT | Mod: GT | Performed by: NURSE PRACTITIONER

## 2021-10-05 RX ORDER — SERTRALINE HYDROCHLORIDE 25 MG/1
TABLET, FILM COATED ORAL
Qty: 55 TABLET | Refills: 0 | Status: SHIPPED | OUTPATIENT
Start: 2021-10-05 | End: 2021-11-08

## 2021-10-05 ASSESSMENT — ANXIETY QUESTIONNAIRES
1. FEELING NERVOUS, ANXIOUS, OR ON EDGE: NEARLY EVERY DAY
2. NOT BEING ABLE TO STOP OR CONTROL WORRYING: NEARLY EVERY DAY
7. FEELING AFRAID AS IF SOMETHING AWFUL MIGHT HAPPEN: NOT AT ALL
5. BEING SO RESTLESS THAT IT IS HARD TO SIT STILL: NEARLY EVERY DAY
6. BECOMING EASILY ANNOYED OR IRRITABLE: NOT AT ALL
GAD7 TOTAL SCORE: 15
3. WORRYING TOO MUCH ABOUT DIFFERENT THINGS: NEARLY EVERY DAY

## 2021-10-05 ASSESSMENT — PATIENT HEALTH QUESTIONNAIRE - PHQ9
SUM OF ALL RESPONSES TO PHQ QUESTIONS 1-9: 8
5. POOR APPETITE OR OVEREATING: NEARLY EVERY DAY

## 2021-10-05 NOTE — PROGRESS NOTES
"Laura is a 26 year old who is being evaluated via a billable video visit.      How would you like to obtain your AVS? MyChart  If the video visit is dropped, the invitation should be resent by: Text to cell phone: . [phone]  Will anyone else be joining your video visit? No      Video Start Time: 2:51 PM    Assessment & Plan     INGE (generalized anxiety disorder)  Chronic, worsening slightly in the last several weeks.  Started on buspirone in April, max dose.  Still reports some mild side effects for about 20 minutes after taking medication.  Recommend switching medications, patient agreeable.  Will have patient wean off of buspirone by taking one half tab twice daily for 3 days, then 1 tab daily for 3 days then stop.  Start sertraline 1 tab daily for 7 days then 2 tabs daily, okay to start during tapering of buspirone.  Advised patient to follow-up via virtual visit in 3 to 4 weeks for recheck.  - sertraline (ZOLOFT) 25 MG tablet; Take 1 tablet (25 mg) by mouth daily for 7 days, THEN 2 tablets (50 mg) daily for 24 days.             BMI:   Estimated body mass index is 33.44 kg/m  as calculated from the following:    Height as of 7/9/21: 1.549 m (5' 1\").    Weight as of 7/9/21: 80.3 kg (177 lb).   Weight management plan: Discussed healthy diet and exercise guidelines    See Patient Instructions    Return in about 1 month (around 11/5/2021) for Recheck.    Maggi Frank, SIM CNP  [facility]    Subjective   Laura is a 26 year old who presents for the following health issues     HPI     Anxiety Follow-Up    How are you doing with your anxiety since your last visit? Worsened delonte in past 6 weeks    Are you having other symptoms that might be associated with anxiety? Almost panic attack    Have you had a significant life event? No     Are you feeling depressed? No    Do you have any concerns with your use of alcohol or other drugs? No      Having side effects from medications for " about 20 minutes after and then goes away  Feeling tightness of chest at times when has increased anxiety  Denies any recent increased stressors, any losses or changes in life to precipitate increased anxiety    Social History     Tobacco Use     Smoking status: Current Every Day Smoker     Packs/day: 0.50     Types: Cigarettes     Smokeless tobacco: Never Used   Substance Use Topics     Alcohol use: Yes     Comment: rare     Drug use: No     INGE-7 SCORE 4/9/2021 5/11/2021 10/5/2021   Total Score 15 (severe anxiety) - -   Total Score 15 15 15     PHQ 4/9/2021 10/5/2021   PHQ-9 Total Score 9 8   Q9: Thoughts of better off dead/self-harm past 2 weeks Not at all Not at all     Last PHQ-9 10/5/2021   1.  Little interest or pleasure in doing things 1   2.  Feeling down, depressed, or hopeless 0   3.  Trouble falling or staying asleep, or sleeping too much 3   4.  Feeling tired or having little energy 3   5.  Poor appetite or overeating 0   6.  Feeling bad about yourself 0   7.  Trouble concentrating 0   8.  Moving slowly or restless 1   Q9: Thoughts of better off dead/self-harm past 2 weeks 0   PHQ-9 Total Score 8     INGE-7  10/5/2021   1. Feeling nervous, anxious, or on edge 3   2. Not being able to stop or control worrying 3   3. Worrying too much about different things 3   4. Trouble relaxing 3   5. Being so restless that it is hard to sit still 3   6. Becoming easily annoyed or irritable 0   7. Feeling afraid, as if something awful might happen 0   INGE-7 Total Score 15         How many servings of fruits and vegetables do you eat daily?  2-3    On average, how many sweetened beverages do you drink each day (Examples: soda, juice, sweet tea, etc.  Do NOT count diet or artificially sweetened beverages)?   0    How many days per week do you exercise enough to make your heart beat faster? 3 or less    How many minutes a day do you exercise enough to make your heart beat faster? 30 - 60    How many days per week do you  miss taking your medication? 0    Medication is causing dizziness      Review of Systems   Constitutional, HEENT, cardiovascular, pulmonary, gi and gu systems are negative, except as otherwise noted.      Objective           Vitals:  No vitals were obtained today due to virtual visit.    Physical Exam   GENERAL: Healthy, alert and no distress  EYES: Eyes grossly normal to inspection.  No discharge or erythema, or obvious scleral/conjunctival abnormalities.  RESP: No audible wheeze, cough, or visible cyanosis.  No visible retractions or increased work of breathing.    SKIN: Visible skin clear. No significant rash, abnormal pigmentation or lesions.  NEURO: Cranial nerves grossly intact.  Mentation and speech appropriate for age.  PSYCH: Mentation appears normal, affect normal/bright, judgement and insight intact, normal speech and appearance well-groomed.    Diagnostic Test Results:  none            Video-Visit Details    Type of service:  Video Visit    Video End Time:3:00 PM    Originating Location (pt. Location): Home    Distant Location (provider location):  [facility]     Platform used for Video Visit: [employer]     Chart documentation with Dragon Voice recognition Software. Although reviewed after completion, some words and grammatical errors may remain.

## 2021-10-05 NOTE — PATIENT INSTRUCTIONS
INGE (generalized anxiety disorder)  Chronic, worsening slightly in the last several weeks.  Started on buspirone in April, max dose.  Still reports some mild side effects for about 20 minutes after taking medication.  Recommend switching medications, patient agreeable.  Will have patient wean off of buspirone by taking one half tab twice daily for 3 days, then 1 tab daily for 3 days then stop.  Start sertraline 1 tab daily for 7 days then 2 tabs daily, okay to start during tapering of buspirone.  Advised patient to follow-up via virtual visit in 3 to 4 weeks for recheck.  - sertraline (ZOLOFT) 25 MG tablet; Take 1 tablet (25 mg) by mouth daily for 7 days, THEN 2 tablets (50 mg) daily for 24 days.

## 2021-10-06 ASSESSMENT — ANXIETY QUESTIONNAIRES: GAD7 TOTAL SCORE: 15

## 2021-11-05 DIAGNOSIS — F41.1 GAD (GENERALIZED ANXIETY DISORDER): ICD-10-CM

## 2021-11-09 ENCOUNTER — VIRTUAL VISIT (OUTPATIENT)
Dept: FAMILY MEDICINE | Facility: CLINIC | Age: 26
End: 2021-11-09
Payer: COMMERCIAL

## 2021-11-09 DIAGNOSIS — F41.1 GAD (GENERALIZED ANXIETY DISORDER): Primary | ICD-10-CM

## 2021-11-09 DIAGNOSIS — Z71.6 ENCOUNTER FOR SMOKING CESSATION COUNSELING: ICD-10-CM

## 2021-11-09 DIAGNOSIS — Z72.0 TOBACCO ABUSE: ICD-10-CM

## 2021-11-09 PROCEDURE — 99214 OFFICE O/P EST MOD 30 MIN: CPT | Mod: GT | Performed by: NURSE PRACTITIONER

## 2021-11-09 RX ORDER — BUPROPION HYDROCHLORIDE 150 MG/1
150 TABLET, EXTENDED RELEASE ORAL 2 TIMES DAILY
Qty: 180 TABLET | Refills: 1 | Status: SHIPPED | OUTPATIENT
Start: 2021-11-09 | End: 2022-06-03

## 2021-11-09 RX ORDER — SERTRALINE HYDROCHLORIDE 25 MG/1
25 TABLET, FILM COATED ORAL DAILY
Qty: 30 TABLET | Refills: 0 | Status: SHIPPED | OUTPATIENT
Start: 2021-11-09 | End: 2022-03-07 | Stop reason: DRUGHIGH

## 2021-11-09 ASSESSMENT — ANXIETY QUESTIONNAIRES
6. BECOMING EASILY ANNOYED OR IRRITABLE: SEVERAL DAYS
3. WORRYING TOO MUCH ABOUT DIFFERENT THINGS: NEARLY EVERY DAY
2. NOT BEING ABLE TO STOP OR CONTROL WORRYING: SEVERAL DAYS
5. BEING SO RESTLESS THAT IT IS HARD TO SIT STILL: NOT AT ALL
7. FEELING AFRAID AS IF SOMETHING AWFUL MIGHT HAPPEN: NOT AT ALL
GAD7 TOTAL SCORE: 11
1. FEELING NERVOUS, ANXIOUS, OR ON EDGE: NEARLY EVERY DAY

## 2021-11-09 ASSESSMENT — PATIENT HEALTH QUESTIONNAIRE - PHQ9: 5. POOR APPETITE OR OVEREATING: NEARLY EVERY DAY

## 2021-11-09 NOTE — PROGRESS NOTES
Laura is a 26 year old who is being evaluated via a billable video visit.      How would you like to obtain your AVS? Jingithart  If the video visit is dropped, the invitation should be resent by: Text to cell phone: [phone]  Will anyone else be joining your video visit? No      Video Start Time: 1:00 PM    Assessment & Plan     INGE (generalized anxiety disorder)  Completely tapered off buspirone at last office visit, tolerated tapering well.  Started sertraline with minimal side effects and is now tolerating well at the 50 mg dose.  Managing anxiety very well, patient is very happy with medication, however feels could use a little more improvement.  Discussed increasing medication to 75 mg, patient is agreeable.  Will have patient start and if doing well in 1 month patient to request refills and if needing dosage change, patient to [employer] message provider.  Encouraged to continue to work on staying active and eating a healthy diet.   - sertraline (ZOLOFT) 50 MG tablet; Take 1 tablet (50 mg) by mouth daily In addition to 25 mg tab to = 75 mg daily  - sertraline (ZOLOFT) 25 MG tablet; Take 1 tablet (25 mg) by mouth daily In addition to 50 mg tab to = 75 mg daily    Tobacco abuse  Chronic, stable.  Continue Wellbutrin without any changes.  - buPROPion (WELLBUTRIN SR) 150 MG 12 hr tablet; Take 1 tablet (150 mg) by mouth 2 times daily    Encounter for smoking cessation counseling  Chronic, stable.  Continue Wellbutrin without any changes.  - buPROPion (WELLBUTRIN SR) 150 MG 12 hr tablet; Take 1 tablet (150 mg) by mouth 2 times daily             See Patient Instructions    Return in about 1 month (around 12/9/2021), or if symptoms worsen or fail to improve.    SIM Zarate CNP  [facility]    Subjective   Lauar is a 26 year old who presents for the following health issues     HPI     Anxiety Follow-Up    How are you doing with your anxiety since your last visit? Improved      Are you having other symptoms that might be associated with anxiety? No    Have you had a significant life event? No     Are you feeling depressed? No    Do you have any concerns with your use of alcohol or other drugs? No     Feels may need increased dose  At night better, still struggles during the day.       Social History     Tobacco Use     Smoking status: Current Every Day Smoker     Packs/day: 0.50     Types: Cigarettes     Smokeless tobacco: Never Used   Substance Use Topics     Alcohol use: Yes     Comment: rare     Drug use: No     INGE-7 SCORE 5/11/2021 10/5/2021 11/9/2021   Total Score - - -   Total Score 15 15 11     PHQ 4/9/2021 10/5/2021   PHQ-9 Total Score 9 8   Q9: Thoughts of better off dead/self-harm past 2 weeks Not at all Not at all     Last PHQ-9 10/5/2021   1.  Little interest or pleasure in doing things 1   2.  Feeling down, depressed, or hopeless 0   3.  Trouble falling or staying asleep, or sleeping too much 3   4.  Feeling tired or having little energy 3   5.  Poor appetite or overeating 0   6.  Feeling bad about yourself 0   7.  Trouble concentrating 0   8.  Moving slowly or restless 1   Q9: Thoughts of better off dead/self-harm past 2 weeks 0   PHQ-9 Total Score 8     INGE-7  11/9/2021   1. Feeling nervous, anxious, or on edge 3   2. Not being able to stop or control worrying 1   3. Worrying too much about different things 3   4. Trouble relaxing 3   5. Being so restless that it is hard to sit still 0   6. Becoming easily annoyed or irritable 1   7. Feeling afraid, as if something awful might happen 0   INGE-7 Total Score 11         How many servings of fruits and vegetables do you eat daily?  2-3    On average, how many sweetened beverages do you drink each day (Examples: soda, juice, sweet tea, etc.  Do NOT count diet or artificially sweetened beverages)?   0    How many days per week do you exercise enough to make your heart beat faster? 3 or less    How many minutes a day do you  exercise enough to make your heart beat faster? 30 - 60    How many days per week do you miss taking your medication? Off medication sertraline for 5 days because needed an appointment      Review of Systems   Constitutional, HEENT, cardiovascular, pulmonary, gi and gu systems are negative, except as otherwise noted.      Objective           Vitals:  No vitals were obtained today due to virtual visit.    Physical Exam   GENERAL: Healthy, alert and no distress  EYES: Eyes grossly normal to inspection.  No discharge or erythema, or obvious scleral/conjunctival abnormalities.  RESP: No audible wheeze, cough, or visible cyanosis.  No visible retractions or increased work of breathing.    SKIN: Visible skin clear. No significant rash, abnormal pigmentation or lesions.  NEURO: Cranial nerves grossly intact.  Mentation and speech appropriate for age.  PSYCH: Mentation appears normal, affect normal/bright, judgement and insight intact, normal speech and appearance well-groomed.    Diagnostic Test Results:  none            Video-Visit Details    Type of service:  Video Visit    Video End Time:1:09 PM    Originating Location (pt. Location): Home    Distant Location (provider location):  [employer]     Platform used for Video Visit: [employer] formerly [employer]     Chart documentation with Dragon Voice recognition Software. Although reviewed after completion, some words and grammatical errors may remain.

## 2021-11-09 NOTE — PATIENT INSTRUCTIONS
INGE (generalized anxiety disorder)  Completely tapered off buspirone at last office visit, tolerated tapering well.  Started sertraline with minimal side effects and is now tolerating well at the 50 mg dose.  Managing anxiety very well, patient is very happy with medication, however feels could use a little more improvement.  Discussed increasing medication to 75 mg, patient is agreeable.  Will have patient start and if doing well in 1 month patient to request refills and if needing dosage change, patient to [facility] provider.  Encouraged to continue to work on staying active and eating a healthy diet.  - sertraline (ZOLOFT) 50 MG tablet; Take 1 tablet (50 mg) by mouth daily In addition to 25 mg tab to = 75 mg daily  - sertraline (ZOLOFT) 25 MG tablet; Take 1 tablet (25 mg) by mouth daily In addition to 50 mg tab to = 75 mg daily    Tobacco abuse  Chronic, stable.  Continue Wellbutrin without any changes.  - buPROPion (WELLBUTRIN SR) 150 MG 12 hr tablet; Take 1 tablet (150 mg) by mouth 2 times daily    Encounter for smoking cessation counseling  Chronic, stable.  Continue Wellbutrin without any changes.  - buPROPion (WELLBUTRIN SR) 150 MG 12 hr tablet; Take 1 tablet (150 mg) by mouth 2 times daily

## 2021-11-10 ASSESSMENT — ANXIETY QUESTIONNAIRES: GAD7 TOTAL SCORE: 11

## 2021-12-02 ENCOUNTER — MYC MEDICAL ADVICE (OUTPATIENT)
Dept: FAMILY MEDICINE | Facility: CLINIC | Age: 26
End: 2021-12-02
Payer: COMMERCIAL

## 2021-12-02 DIAGNOSIS — F41.1 GAD (GENERALIZED ANXIETY DISORDER): ICD-10-CM

## 2021-12-04 RX ORDER — SERTRALINE HYDROCHLORIDE 100 MG/1
100 TABLET, FILM COATED ORAL DAILY
Qty: 90 TABLET | Refills: 0 | Status: SHIPPED | OUTPATIENT
Start: 2021-12-04 | End: 2021-12-04

## 2021-12-04 RX ORDER — SERTRALINE HYDROCHLORIDE 100 MG/1
100 TABLET, FILM COATED ORAL DAILY
Qty: 90 TABLET | Refills: 0 | Status: SHIPPED | OUTPATIENT
Start: 2021-12-04 | End: 2022-03-08

## 2022-01-09 ENCOUNTER — HEALTH MAINTENANCE LETTER (OUTPATIENT)
Age: 27
End: 2022-01-09

## 2022-03-06 ENCOUNTER — MYC MEDICAL ADVICE (OUTPATIENT)
Dept: FAMILY MEDICINE | Facility: CLINIC | Age: 27
End: 2022-03-06
Payer: COMMERCIAL

## 2022-03-06 DIAGNOSIS — F41.1 GAD (GENERALIZED ANXIETY DISORDER): ICD-10-CM

## 2022-03-08 RX ORDER — SERTRALINE HYDROCHLORIDE 100 MG/1
100 TABLET, FILM COATED ORAL DAILY
Qty: 90 TABLET | Refills: 0 | Status: SHIPPED | OUTPATIENT
Start: 2022-03-08 | End: 2022-10-04

## 2022-03-08 RX ORDER — SERTRALINE HYDROCHLORIDE 25 MG/1
25 TABLET, FILM COATED ORAL DAILY
Qty: 90 TABLET | Refills: 0 | Status: SHIPPED | OUTPATIENT
Start: 2022-03-08 | End: 2022-10-04

## 2022-06-03 DIAGNOSIS — Z72.0 TOBACCO ABUSE: ICD-10-CM

## 2022-06-03 DIAGNOSIS — Z71.6 ENCOUNTER FOR SMOKING CESSATION COUNSELING: ICD-10-CM

## 2022-06-03 RX ORDER — BUPROPION HYDROCHLORIDE 150 MG/1
TABLET, EXTENDED RELEASE ORAL
Qty: 180 TABLET | Refills: 1 | Status: SHIPPED | OUTPATIENT
Start: 2022-06-03 | End: 2023-04-11

## 2022-07-25 ENCOUNTER — TELEPHONE (OUTPATIENT)
Dept: FAMILY MEDICINE | Facility: CLINIC | Age: 27
End: 2022-07-25

## 2022-07-25 NOTE — TELEPHONE ENCOUNTER
Forms from [facility] received for Maggi Pichardo to complete for pt. Maggi needs to see pt for a virtual visit before this form can be completed. Called and spoke with pt to schedule this VV, pt stated she will call back to schedule this appt, said she should be receiving her work schedule today.    Yvonne Oconnor Patient

## 2022-07-28 ENCOUNTER — MYC MEDICAL ADVICE (OUTPATIENT)
Dept: FAMILY MEDICINE | Facility: CLINIC | Age: 27
End: 2022-07-28

## 2022-08-02 NOTE — TELEPHONE ENCOUNTER
Please place form back in providers folder to complete.    Thanks,  Maggi Pichardo, YARI, APRN-CNP

## 2022-08-03 NOTE — TELEPHONE ENCOUNTER
Forms completed by Maggi Pichardo. Faxed back on 8/3/2022. Left message on pt's identified voicemail that forms were completed and faxed back. Sent to scanning.    Yvonne Oconnor Patient

## 2022-09-04 ENCOUNTER — OFFICE VISIT (OUTPATIENT)
Dept: URGENT CARE | Facility: URGENT CARE | Age: 27
End: 2022-09-04
Payer: COMMERCIAL

## 2022-09-04 VITALS
BODY MASS INDEX: 32.12 KG/M2 | SYSTOLIC BLOOD PRESSURE: 128 MMHG | WEIGHT: 170 LBS | HEART RATE: 78 BPM | TEMPERATURE: 97.7 F | DIASTOLIC BLOOD PRESSURE: 90 MMHG | OXYGEN SATURATION: 99 %

## 2022-09-04 DIAGNOSIS — M54.41 ACUTE BILATERAL LOW BACK PAIN WITH BILATERAL SCIATICA: ICD-10-CM

## 2022-09-04 DIAGNOSIS — S99.911A INJURY OF RIGHT ANKLE, INITIAL ENCOUNTER: Primary | ICD-10-CM

## 2022-09-04 DIAGNOSIS — M54.42 ACUTE BILATERAL LOW BACK PAIN WITH BILATERAL SCIATICA: ICD-10-CM

## 2022-09-04 PROCEDURE — 99213 OFFICE O/P EST LOW 20 MIN: CPT | Performed by: FAMILY MEDICINE

## 2022-09-04 ASSESSMENT — ENCOUNTER SYMPTOMS
ENDOCRINE NEGATIVE: 1
ARTHRALGIAS: 1
RESPIRATORY NEGATIVE: 1
CONSTITUTIONAL NEGATIVE: 1
JOINT SWELLING: 1
GASTROINTESTINAL NEGATIVE: 1
CARDIOVASCULAR NEGATIVE: 1
PSYCHIATRIC NEGATIVE: 1
ALLERGIC/IMMUNOLOGIC NEGATIVE: 1
EYES NEGATIVE: 1
HEMATOLOGIC/LYMPHATIC NEGATIVE: 1

## 2022-09-04 NOTE — PROGRESS NOTES
SUBJECTIVE:   Laura Gaston is a 27 year old female presenting with a chief complaint of   Chief Complaint   Patient presents with     Trauma     Right ankle, was at state [facility] and was walking and twisted her ankle. Happened last night.       She is an established patient of [facility].    MS Injury/Pain    Onset of symptoms was 1 day(s) ago.  Location: right ankle  Context:       The injury happened while while walking      Mechanism: twisting      Patient experienced immediate pain, immediate swelling  Course of symptoms is worsening.    Severity moderate  Current and Associated symptoms: Pain, Swelling, Tenderness and Decreased range of motion  Denies  Stiffness  Aggravating Factors: walking, weight-bearing and movement  Therapies to improve symptoms include: ice and ibuprofen  This is the first time this type of problem has occurred for this patient.       27 yr old female here for right ankle pain. Twisted last night while at the [facility]. Reports immediate pain and swelling.     Review of Systems   Constitutional: Negative.    HENT: Negative.    Eyes: Negative.    Respiratory: Negative.    Cardiovascular: Negative.    Gastrointestinal: Negative.    Endocrine: Negative.    Genitourinary: Negative.    Musculoskeletal: Positive for arthralgias, gait problem and joint swelling.   Allergic/Immunologic: Negative.    Hematological: Negative.    Psychiatric/Behavioral: Negative.        No past medical history on file.  Family History   Problem Relation Age of Onset     Cardiovascular Maternal Grandmother 64        aortic aneur.     Psychotic Disorder Maternal Grandmother         depression     Pancreatic Cancer Maternal Grandmother      Cancer - colorectal Maternal Grandfather      Psychotic Disorder Maternal Grandfather         depression     Psychotic Disorder Paternal Grandmother         depression     Psychotic Disorder Paternal Grandfather         depression     Current Outpatient Medications   Medication Sig  Dispense Refill     buPROPion (WELLBUTRIN SR) 150 MG 12 hr tablet TAKE 1 TABLET(150 MG) BY MOUTH TWICE DAILY 180 tablet 1     sertraline (ZOLOFT) 100 MG tablet Take 1 tablet (100 mg) by mouth daily In addition to 25 mg tab to = 125 mg 90 tablet 0     sertraline (ZOLOFT) 25 MG tablet Take 1 tablet (25 mg) by mouth daily In addition to 100 mg tab to = 125 mg 90 tablet 0     acetaminophen (TYLENOL) 500 MG tablet Take 500-1,000 mg by mouth every 6 hours as needed for mild pain       cyclobenzaprine (FLEXERIL) 10 MG tablet Take 1 tablet (10 mg) by mouth 3 times daily as needed for muscle spasms (Patient not taking: No sig reported) 30 tablet 3     norethindrone-ethinyl estradiol (MICROGESTIN) 1-20 MG-MCG tablet daily       Social History     Tobacco Use     Smoking status: Current Every Day Smoker     Packs/day: 0.50     Types: Cigarettes     Smokeless tobacco: Never Used   Substance Use Topics     Alcohol use: Yes     Comment: rare       OBJECTIVE  BP (!) 128/90   Pulse 78   Temp 97.7  F (36.5  C) (Tympanic)   Wt 77.1 kg (170 lb)   SpO2 99%   BMI 32.12 kg/m      Physical Exam  Constitutional:       Appearance: Normal appearance.   HENT:      Head: Normocephalic and atraumatic.   Eyes:      Pupils: Pupils are equal, round, and reactive to light.   Musculoskeletal:         General: Swelling, tenderness and signs of injury present.        Legs:       Comments: Swelling on the lateral right ankle   Neurological:      General: No focal deficit present.      Mental Status: She is alert and oriented to person, place, and time.   Psychiatric:         Mood and Affect: Mood normal.         Behavior: Behavior normal.         Labs:  No results found for this or any previous visit (from the past 24 hour(s)).    X-Ray was done, my findings are: normal appearing await radiology report    ASSESSMENT:      ICD-10-CM    1. Injury of right ankle, initial encounter  S99.911A XR Ankle Right G/E 3 Views     CAM WALKER    Recommend icing  , elevating. CAM walker given and crutches.    Medical Decision Making:    Differential Diagnosis:  MS Injury Pain: sprain    Serious Comorbid Conditions:  Adult:  None    PLAN:    MS Injury/Pain  ice, elevate, rest, Tylenol and Ibuprofen    Followup:    If not improving or if condition worsens, follow up with your Primary Care Provider    There are no Patient Instructions on file for this visit.

## 2022-10-03 DIAGNOSIS — F41.1 GAD (GENERALIZED ANXIETY DISORDER): ICD-10-CM

## 2022-10-04 ENCOUNTER — LAB REQUISITION (OUTPATIENT)
Dept: LAB | Facility: CLINIC | Age: 27
End: 2022-10-04

## 2022-10-04 DIAGNOSIS — Z34.91 ENCOUNTER FOR SUPERVISION OF NORMAL PREGNANCY, UNSPECIFIED, FIRST TRIMESTER: ICD-10-CM

## 2022-10-04 LAB — HCG INTACT+B SERPL-ACNC: 1112 MIU/ML

## 2022-10-04 PROCEDURE — 84702 CHORIONIC GONADOTROPIN TEST: CPT | Performed by: PHYSICIAN ASSISTANT

## 2022-10-04 RX ORDER — SERTRALINE HYDROCHLORIDE 25 MG/1
25 TABLET, FILM COATED ORAL DAILY
Qty: 90 TABLET | Refills: 0 | Status: SHIPPED | OUTPATIENT
Start: 2022-10-04 | End: 2023-02-14

## 2022-10-04 RX ORDER — SERTRALINE HYDROCHLORIDE 100 MG/1
TABLET, FILM COATED ORAL
Qty: 90 TABLET | Refills: 0 | Status: SHIPPED | OUTPATIENT
Start: 2022-10-04 | End: 2023-02-14

## 2022-10-18 ENCOUNTER — LAB REQUISITION (OUTPATIENT)
Dept: LAB | Facility: CLINIC | Age: 27
End: 2022-10-18

## 2022-10-18 DIAGNOSIS — Z34.01 ENCOUNTER FOR SUPERVISION OF NORMAL FIRST PREGNANCY, FIRST TRIMESTER: ICD-10-CM

## 2022-10-18 DIAGNOSIS — Z34.01 ENCOUNTER FOR SUPERVISION OF NORMAL FIRST PREGNANCY IN FIRST TRIMESTER: Primary | ICD-10-CM

## 2022-10-18 LAB
BASOPHILS # BLD AUTO: 0.1 10E3/UL (ref 0–0.2)
BASOPHILS NFR BLD AUTO: 1 %
EOSINOPHIL # BLD AUTO: 0.1 10E3/UL (ref 0–0.7)
EOSINOPHIL NFR BLD AUTO: 1 %
ERYTHROCYTE [DISTWIDTH] IN BLOOD BY AUTOMATED COUNT: 13.2 % (ref 10–15)
HCT VFR BLD AUTO: 37.6 % (ref 35–47)
HGB BLD-MCNC: 12.2 G/DL (ref 11.7–15.7)
IMM GRANULOCYTES # BLD: 0 10E3/UL
IMM GRANULOCYTES NFR BLD: 0 %
LYMPHOCYTES # BLD AUTO: 2.7 10E3/UL (ref 0.8–5.3)
LYMPHOCYTES NFR BLD AUTO: 27 %
MCH RBC QN AUTO: 29.9 PG (ref 26.5–33)
MCHC RBC AUTO-ENTMCNC: 32.4 G/DL (ref 31.5–36.5)
MCV RBC AUTO: 92 FL (ref 78–100)
MONOCYTES # BLD AUTO: 0.7 10E3/UL (ref 0–1.3)
MONOCYTES NFR BLD AUTO: 7 %
NEUTROPHILS # BLD AUTO: 6.3 10E3/UL (ref 1.6–8.3)
NEUTROPHILS NFR BLD AUTO: 64 %
NRBC # BLD AUTO: 0 10E3/UL
NRBC BLD AUTO-RTO: 0 /100
PLATELET # BLD AUTO: 305 10E3/UL (ref 150–450)
RBC # BLD AUTO: 4.08 10E6/UL (ref 3.8–5.2)
WBC # BLD AUTO: 9.8 10E3/UL (ref 4–11)

## 2022-10-18 PROCEDURE — 86803 HEPATITIS C AB TEST: CPT | Performed by: NURSE PRACTITIONER

## 2022-10-18 PROCEDURE — 86762 RUBELLA ANTIBODY: CPT | Performed by: NURSE PRACTITIONER

## 2022-10-18 PROCEDURE — 86780 TREPONEMA PALLIDUM: CPT | Performed by: NURSE PRACTITIONER

## 2022-10-18 PROCEDURE — 87389 HIV-1 AG W/HIV-1&-2 AB AG IA: CPT | Performed by: NURSE PRACTITIONER

## 2022-10-18 PROCEDURE — 87340 HEPATITIS B SURFACE AG IA: CPT | Performed by: NURSE PRACTITIONER

## 2022-10-18 PROCEDURE — 87086 URINE CULTURE/COLONY COUNT: CPT | Performed by: NURSE PRACTITIONER

## 2022-10-18 PROCEDURE — 85004 AUTOMATED DIFF WBC COUNT: CPT | Performed by: NURSE PRACTITIONER

## 2022-10-19 LAB
HBV SURFACE AG SERPL QL IA: NONREACTIVE
HCV AB SERPL QL IA: NONREACTIVE
HIV 1+2 AB+HIV1 P24 AG SERPL QL IA: NONREACTIVE
RUBV IGG SERPL QL IA: 2.26 INDEX
RUBV IGG SERPL QL IA: POSITIVE
T PALLIDUM AB SER QL: NONREACTIVE

## 2022-10-20 ENCOUNTER — LAB REQUISITION (OUTPATIENT)
Dept: LAB | Facility: CLINIC | Age: 27
End: 2022-10-20

## 2022-10-20 DIAGNOSIS — Z34.01 ENCOUNTER FOR SUPERVISION OF NORMAL FIRST PREGNANCY, FIRST TRIMESTER: ICD-10-CM

## 2022-10-20 LAB — BACTERIA UR CULT: NO GROWTH

## 2022-10-20 PROCEDURE — 86850 RBC ANTIBODY SCREEN: CPT | Performed by: NURSE PRACTITIONER

## 2022-10-21 LAB
ABO/RH(D): NORMAL
ANTIBODY SCREEN: NEGATIVE
SPECIMEN EXPIRATION DATE: NORMAL

## 2022-11-21 ENCOUNTER — HEALTH MAINTENANCE LETTER (OUTPATIENT)
Age: 27
End: 2022-11-21

## 2023-01-18 ENCOUNTER — TRANSFERRED RECORDS (OUTPATIENT)
Dept: HEALTH INFORMATION MANAGEMENT | Facility: CLINIC | Age: 28
End: 2023-01-18

## 2023-02-16 DIAGNOSIS — F41.1 GAD (GENERALIZED ANXIETY DISORDER): ICD-10-CM

## 2023-02-20 RX ORDER — SERTRALINE HYDROCHLORIDE 100 MG/1
TABLET, FILM COATED ORAL
Qty: 90 TABLET | Refills: 0 | OUTPATIENT
Start: 2023-02-20

## 2023-03-01 ENCOUNTER — TRANSFERRED RECORDS (OUTPATIENT)
Dept: MIDWIFE SERVICES | Facility: CLINIC | Age: 28
End: 2023-03-01

## 2023-03-01 ENCOUNTER — LAB REQUISITION (OUTPATIENT)
Dept: LAB | Facility: CLINIC | Age: 28
End: 2023-03-01

## 2023-03-01 DIAGNOSIS — Z3A.26 26 WEEKS GESTATION OF PREGNANCY: ICD-10-CM

## 2023-03-01 PROCEDURE — 86780 TREPONEMA PALLIDUM: CPT | Performed by: OBSTETRICS & GYNECOLOGY

## 2023-03-02 LAB — T PALLIDUM AB SER QL: NONREACTIVE

## 2023-03-28 ENCOUNTER — OFFICE VISIT (OUTPATIENT)
Dept: FAMILY MEDICINE | Facility: CLINIC | Age: 28
End: 2023-03-28
Payer: COMMERCIAL

## 2023-03-28 VITALS
HEART RATE: 99 BPM | HEIGHT: 61 IN | RESPIRATION RATE: 22 BRPM | WEIGHT: 207.8 LBS | TEMPERATURE: 99 F | BODY MASS INDEX: 39.23 KG/M2 | DIASTOLIC BLOOD PRESSURE: 84 MMHG | SYSTOLIC BLOOD PRESSURE: 136 MMHG

## 2023-03-28 DIAGNOSIS — Z33.1 PREGNANCY, INCIDENTAL: ICD-10-CM

## 2023-03-28 DIAGNOSIS — M65.4 DE QUERVAIN'S DISEASE (TENOSYNOVITIS): Primary | ICD-10-CM

## 2023-03-28 PROCEDURE — 20551 NJX 1 TENDON ORIGIN/INSJ: CPT | Performed by: FAMILY MEDICINE

## 2023-03-28 PROCEDURE — 99213 OFFICE O/P EST LOW 20 MIN: CPT | Mod: 25 | Performed by: FAMILY MEDICINE

## 2023-03-28 RX ORDER — TRIAMCINOLONE ACETONIDE 40 MG/ML
40 INJECTION, SUSPENSION INTRA-ARTICULAR; INTRAMUSCULAR ONCE
Status: COMPLETED | OUTPATIENT
Start: 2023-03-28 | End: 2023-03-28

## 2023-03-28 RX ADMIN — TRIAMCINOLONE ACETONIDE 40 MG: 40 INJECTION, SUSPENSION INTRA-ARTICULAR; INTRAMUSCULAR at 11:16

## 2023-03-28 ASSESSMENT — PAIN SCALES - GENERAL: PAINLEVEL: SEVERE PAIN (6)

## 2023-03-28 NOTE — PROGRESS NOTES
"  Assessment & Plan     De Quervain's disease (tenosynovitis)  My first visit with patient and previous history reviewed with her and in her chart.  Early in third trimester of pregnancy.  Developed some wrist pain couple of months ago that just is not getting better despite wearing a wrist splint.  No specific injury but she uses her hands a lot at work.  Exam consistent with radial styloid tenosynovitis.  Given her state of pregnancy we are avoiding anti-inflammatories topical anti-inflammatories.  Can use some Tylenol and local care.  The cortisone injection would be considered safe and patient agrees to injection.  Aftercare discussed.  - INJECTION SINGLE TENDON ORIGIN/INSERTION  - triamcinolone (KENALOG-40) injection 40 mg    Pregnancy, incidental  As above           Nicotine/Tobacco Cessation:  She reports that she has been smoking cigarettes. She has been smoking an average of .5 packs per day. She has never used smokeless tobacco.  Nicotine/Tobacco Cessation Plan:   Self help information given to patient      BMI:   Estimated body mass index is 39.26 kg/m  as calculated from the following:    Height as of this encounter: 1.549 m (5' 1\").    Weight as of this encounter: 94.3 kg (207 lb 12.8 oz).       See Patient Instructions    Blanca Garcia MD  [facility]    Darling Sanchez is a 28 year old, presenting for the following health issues:  Tendonitis (Right hand x 8weeks)    Additional Questions 3/28/2023   Roomed by Yin OCHOA   Accompanied by self     HPI       Here today for ongoing right wrist pain as      Review of Systems   Constitutional, HEENT, cardiovascular, pulmonary, gi and gu systems are negative, except as otherwise noted.      Objective    /84 (BP Location: Right arm, Patient Position: Sitting, Cuff Size: Adult Regular)   Pulse 99   Temp 99  F (37.2  C) (Oral)   Resp 22   Ht 1.549 m (5' 1\")   Wt 94.3 kg (207 lb 12.8 oz)   BMI 39.26 kg/m    Body " mass index is 39.26 kg/m .  Physical Exam   Alert, pleasant, upbeat, and in no apparent discomfort.  Finkelstein test positive on right  Past labs reviewed with the patient.     The risks and benefits, as well as expected effect, of tendon injection was discussed with patient and he agrees to proceed.  After identifying the area of tenderness the area was prepped clean with Betadine.  The skin was anesthetized with 1% lidocaine.  The tendon sheath was injected with 3 cc of a 2:1 mixture of lidocaine 1% and kenalog 40.  Pain experienced initially, followed by pain relief.  Bandaid. No complications.

## 2023-03-28 NOTE — PROGRESS NOTES
Answers for HPI/ROS submitted by the patient on 3/28/2023  How many servings of fruits and vegetables do you eat daily?: 2-3  On average, how many sweetened beverages do you drink each day (Examples: soda, juice, sweet tea, etc.  Do NOT count diet or artificially sweetened beverages)?: 0  How many minutes a day do you exercise enough to make your heart beat faster?: 30 to 60  How many days a week do you exercise enough to make your heart beat faster?: 3 or less  How many days per week do you miss taking your medication?: 0  What is the reason for your visit today?: Possible tendinitis  When did your symptoms begin?: More than a month  What are your symptoms?: Dull ache in wrist & shooting pain up arm  How would you describe these symptoms?: Moderate  Are your symptoms:: Worsening  Have you had these symptoms before?: No  Is there anything that makes you feel worse?: Use  Is there anything that makes you feel better?: no

## 2023-03-30 ENCOUNTER — TRANSFERRED RECORDS (OUTPATIENT)
Dept: HEALTH INFORMATION MANAGEMENT | Facility: CLINIC | Age: 28
End: 2023-03-30

## 2023-04-06 ENCOUNTER — TRANSFERRED RECORDS (OUTPATIENT)
Dept: HEALTH INFORMATION MANAGEMENT | Facility: CLINIC | Age: 28
End: 2023-04-06

## 2023-04-06 ENCOUNTER — TELEPHONE (OUTPATIENT)
Dept: MIDWIFE SERVICES | Facility: CLINIC | Age: 28
End: 2023-04-06

## 2023-04-06 ASSESSMENT — ANXIETY QUESTIONNAIRES
8. IF YOU CHECKED OFF ANY PROBLEMS, HOW DIFFICULT HAVE THESE MADE IT FOR YOU TO DO YOUR WORK, TAKE CARE OF THINGS AT HOME, OR GET ALONG WITH OTHER PEOPLE?: NOT DIFFICULT AT ALL
GAD7 TOTAL SCORE: 5
5. BEING SO RESTLESS THAT IT IS HARD TO SIT STILL: SEVERAL DAYS
7. FEELING AFRAID AS IF SOMETHING AWFUL MIGHT HAPPEN: NOT AT ALL
IF YOU CHECKED OFF ANY PROBLEMS ON THIS QUESTIONNAIRE, HOW DIFFICULT HAVE THESE PROBLEMS MADE IT FOR YOU TO DO YOUR WORK, TAKE CARE OF THINGS AT HOME, OR GET ALONG WITH OTHER PEOPLE: NOT DIFFICULT AT ALL
7. FEELING AFRAID AS IF SOMETHING AWFUL MIGHT HAPPEN: NOT AT ALL
3. WORRYING TOO MUCH ABOUT DIFFERENT THINGS: SEVERAL DAYS
1. FEELING NERVOUS, ANXIOUS, OR ON EDGE: SEVERAL DAYS
6. BECOMING EASILY ANNOYED OR IRRITABLE: NOT AT ALL
GAD7 TOTAL SCORE: 5
2. NOT BEING ABLE TO STOP OR CONTROL WORRYING: SEVERAL DAYS
4. TROUBLE RELAXING: SEVERAL DAYS
GAD7 TOTAL SCORE: 5

## 2023-04-06 NOTE — TELEPHONE ENCOUNTER
Need records by 04/13/2023.  We received some records by fax by patient, but will need full records sent (no prenatal visit info or ultrasound results).

## 2023-04-06 NOTE — TELEPHONE ENCOUNTER
Pt would like to transfer from [facility] to midwives in [place]. She is scheduled on 4/20 with Kassy and will be about 34 weeks. She will fax records

## 2023-04-11 ENCOUNTER — VIRTUAL VISIT (OUTPATIENT)
Dept: FAMILY MEDICINE | Facility: CLINIC | Age: 28
End: 2023-04-11
Payer: COMMERCIAL

## 2023-04-11 DIAGNOSIS — F41.1 GAD (GENERALIZED ANXIETY DISORDER): ICD-10-CM

## 2023-04-11 PROCEDURE — 99213 OFFICE O/P EST LOW 20 MIN: CPT | Mod: VID | Performed by: NURSE PRACTITIONER

## 2023-04-11 RX ORDER — ONDANSETRON 4 MG/1
TABLET, FILM COATED ORAL EVERY 8 HOURS PRN
Status: ON HOLD | COMMUNITY
End: 2023-05-22

## 2023-04-11 RX ORDER — MULTIVITAMIN WITH IRON
1 TABLET ORAL DAILY
Status: ON HOLD | COMMUNITY
End: 2023-05-22

## 2023-04-11 RX ORDER — SERTRALINE HYDROCHLORIDE 100 MG/1
TABLET, FILM COATED ORAL
Qty: 90 TABLET | Refills: 0 | Status: SHIPPED | OUTPATIENT
Start: 2023-04-11 | End: 2023-07-18

## 2023-04-11 RX ORDER — SERTRALINE HYDROCHLORIDE 25 MG/1
TABLET, FILM COATED ORAL
Qty: 90 TABLET | Refills: 0 | Status: SHIPPED | OUTPATIENT
Start: 2023-04-11 | End: 2023-07-18

## 2023-04-11 RX ORDER — PRENATAL VIT/IRON FUM/FOLIC AC 27MG-0.8MG
1 TABLET ORAL DAILY
Status: ON HOLD | COMMUNITY
End: 2024-07-20

## 2023-04-11 ASSESSMENT — ANXIETY QUESTIONNAIRES: GAD7 TOTAL SCORE: 5

## 2023-04-11 NOTE — PROGRESS NOTES
Laura is a 28 year old who is being evaluated via a billable video visit.      How would you like to obtain your AVS? MyChart  If the video visit is dropped, the invitation should be resent by: Text to cell phone: [phone]  Will anyone else be joining your video visit? No          Assessment & Plan     INGE (generalized anxiety disorder)  Chronic, stable on current dose of sertraline.  Is 32 weeks pregnant and doing well.  Mood stable.  Discussed starting Wellbutrin after delivery for smoking, down to 5 cigarettes a day.  Encouraged to continue to work on this and will follow up with me in clinic after baby is born.  - sertraline (ZOLOFT) 100 MG tablet; TAKE 1 TABLET(100 MG) BY MOUTH DAILY IN ADDITION TO 25 MG TABLET TO= 125 MG due for appt with primary care provider before further fills  - sertraline (ZOLOFT) 25 MG tablet; TAKE 1 TABLET(25 MG) BY MOUTH DAILY IN ADDITION  MG TABLET TO= 125 MG provider appt due before further fills.             See Patient Instructions    Maggi Pichardo, YARI, APRN-CNP   [facility]   Laura is a 28 year old, presenting for the following health issues:  Anxiety                     History of Present Illness       Mental Health Follow-up:  Patient presents to follow-up on Anxiety.    Patient's anxiety since last visit has been:  No change  The patient is not having other symptoms associated with anxiety.  Any significant life events: No  Patient is feeling anxious or having panic attacks.  Patient has no concerns about alcohol or drug use.    She eats 4 or more servings of fruits and vegetables daily.She consumes 0 sweetened beverage(s) daily.She exercises with enough effort to increase her heart rate 30 to 60 minutes per day.  She exercises with enough effort to increase her heart rate 4 days per week.   She is taking medications regularly.  Today's INGE-7 Score: 5    Currently pregnant 32 weeks.  Taking Sertraline 125 mg  daily  Smoking less than 5 cigs a day. After delivery would like to go back on the Wellbutrin to try to quit smoking - was helping craving   Runs a gas station, stressful        Review of Systems   Constitutional, HEENT, cardiovascular, pulmonary, gi and gu systems are negative, except as otherwise noted.      Objective           Vitals:  No vitals were obtained today due to virtual visit.    Physical Exam   GENERAL: Healthy, alert and no distress  EYES: Eyes grossly normal to inspection.  No discharge or erythema, or obvious scleral/conjunctival abnormalities.  RESP: No audible wheeze, cough, or visible cyanosis.  No visible retractions or increased work of breathing.    SKIN: Visible skin clear. No significant rash, abnormal pigmentation or lesions.  NEURO: Cranial nerves grossly intact.  Mentation and speech appropriate for age.  PSYCH: Mentation appears normal, affect normal/bright, judgement and insight intact, normal speech and appearance well-groomed.    Diagnostic Test Results:  none            Video-Visit Details    Type of service:  Video Visit     Originating Location (pt. Location): Home    Distant Location (provider location):  Off-site  Platform used for Video Visit: [employer]    Chart documentation with Dragon Voice recognition Software. Although reviewed after completion, some words and grammatical errors may remain.

## 2023-04-11 NOTE — PATIENT INSTRUCTIONS
INGE (generalized anxiety disorder)  Chronic, stable on current dose of sertraline.  Is 32 weeks pregnant and doing well.  Mood stable.  Discussed starting Wellbutrin after delivery for smoking, down to 5 cigarettes a day.  Encouraged to continue to work on this and will follow up with me in clinic after baby is born.  - sertraline (ZOLOFT) 100 MG tablet; TAKE 1 TABLET(100 MG) BY MOUTH DAILY IN ADDITION TO 25 MG TABLET TO= 125 MG due for appt with primary care provider before further fills  - sertraline (ZOLOFT) 25 MG tablet; TAKE 1 TABLET(25 MG) BY MOUTH DAILY IN ADDITION  MG TABLET TO= 125 MG provider appt due before further fills.

## 2023-04-13 ENCOUNTER — TRANSFERRED RECORDS (OUTPATIENT)
Dept: HEALTH INFORMATION MANAGEMENT | Facility: CLINIC | Age: 28
End: 2023-04-13

## 2023-04-17 NOTE — TELEPHONE ENCOUNTER
I talked with Laura and let her know we will need ultrasound reports and her visit notes sent.  She will reach out to [facility] and have them faxed to us.

## 2023-04-18 NOTE — TELEPHONE ENCOUNTER
Clari MATT does not have a release of information on file for her.  I let her know there is one online through them that she can fill out and submit online.

## 2023-04-18 NOTE — TELEPHONE ENCOUNTER
Pt called back and said that she filled out a release of information. They should fax records today

## 2023-04-20 ENCOUNTER — PRENATAL OFFICE VISIT (OUTPATIENT)
Dept: MIDWIFE SERVICES | Facility: CLINIC | Age: 28
End: 2023-04-20
Payer: COMMERCIAL

## 2023-04-20 VITALS — WEIGHT: 204 LBS | SYSTOLIC BLOOD PRESSURE: 128 MMHG | BODY MASS INDEX: 38.55 KG/M2 | DIASTOLIC BLOOD PRESSURE: 84 MMHG

## 2023-04-20 DIAGNOSIS — K21.00 GASTROESOPHAGEAL REFLUX DISEASE WITH ESOPHAGITIS WITHOUT HEMORRHAGE: ICD-10-CM

## 2023-04-20 DIAGNOSIS — F32.A DEPRESSIVE DISORDER: ICD-10-CM

## 2023-04-20 DIAGNOSIS — Z72.0 TOBACCO ABUSE: ICD-10-CM

## 2023-04-20 DIAGNOSIS — O99.013 ANEMIA DURING PREGNANCY IN THIRD TRIMESTER: ICD-10-CM

## 2023-04-20 DIAGNOSIS — Z34.03 ENCOUNTER FOR SUPERVISION OF NORMAL FIRST PREGNANCY IN THIRD TRIMESTER: Primary | ICD-10-CM

## 2023-04-20 DIAGNOSIS — R03.0 ELEVATED BLOOD PRESSURE READING WITHOUT DIAGNOSIS OF HYPERTENSION: ICD-10-CM

## 2023-04-20 DIAGNOSIS — O26.03 EXCESSIVE WEIGHT GAIN DURING PREGNANCY IN THIRD TRIMESTER: ICD-10-CM

## 2023-04-20 DIAGNOSIS — F41.1 GAD (GENERALIZED ANXIETY DISORDER): ICD-10-CM

## 2023-04-20 LAB
ALBUMIN SERPL BCG-MCNC: 3.7 G/DL (ref 3.5–5.2)
ALP SERPL-CCNC: 100 U/L (ref 35–104)
ALT SERPL W P-5'-P-CCNC: 15 U/L (ref 10–35)
ANION GAP SERPL CALCULATED.3IONS-SCNC: 13 MMOL/L (ref 7–15)
AST SERPL W P-5'-P-CCNC: 20 U/L (ref 10–35)
BILIRUB SERPL-MCNC: <0.2 MG/DL
BUN SERPL-MCNC: 5.3 MG/DL (ref 6–20)
CALCIUM SERPL-MCNC: 9.6 MG/DL (ref 8.6–10)
CHLORIDE SERPL-SCNC: 99 MMOL/L (ref 98–107)
CREAT SERPL-MCNC: 0.51 MG/DL (ref 0.51–0.95)
DEPRECATED HCO3 PLAS-SCNC: 19 MMOL/L (ref 22–29)
ERYTHROCYTE [DISTWIDTH] IN BLOOD BY AUTOMATED COUNT: 13.6 % (ref 10–15)
GFR SERPL CREATININE-BSD FRML MDRD: >90 ML/MIN/1.73M2
GLUCOSE SERPL-MCNC: 93 MG/DL (ref 70–99)
HCT VFR BLD AUTO: 31 % (ref 35–47)
HGB BLD-MCNC: 10.6 G/DL (ref 11.7–15.7)
MCH RBC QN AUTO: 30 PG (ref 26.5–33)
MCHC RBC AUTO-ENTMCNC: 34.2 G/DL (ref 31.5–36.5)
MCV RBC AUTO: 88 FL (ref 78–100)
PLATELET # BLD AUTO: 271 10E3/UL (ref 150–450)
POTASSIUM SERPL-SCNC: 3.7 MMOL/L (ref 3.4–5.3)
PROT SERPL-MCNC: 6.5 G/DL (ref 6.4–8.3)
RBC # BLD AUTO: 3.53 10E6/UL (ref 3.8–5.2)
SODIUM SERPL-SCNC: 131 MMOL/L (ref 136–145)
WBC # BLD AUTO: 10.7 10E3/UL (ref 4–11)

## 2023-04-20 PROCEDURE — 80053 COMPREHEN METABOLIC PANEL: CPT | Performed by: ADVANCED PRACTICE MIDWIFE

## 2023-04-20 PROCEDURE — 85027 COMPLETE CBC AUTOMATED: CPT | Performed by: ADVANCED PRACTICE MIDWIFE

## 2023-04-20 PROCEDURE — 84156 ASSAY OF PROTEIN URINE: CPT | Performed by: ADVANCED PRACTICE MIDWIFE

## 2023-04-20 PROCEDURE — 36415 COLL VENOUS BLD VENIPUNCTURE: CPT | Performed by: ADVANCED PRACTICE MIDWIFE

## 2023-04-20 ASSESSMENT — EDINBURGH POSTNATAL DEPRESSION SCALE (EPDS)
THINGS HAVE BEEN GETTING ON TOP OF ME: NO, MOST OF THE TIME I HAVE COPED QUITE WELL
TOTAL SCORE: 9
I HAVE BLAMED MYSELF UNNECESSARILY WHEN THINGS WENT WRONG: YES, SOME OF THE TIME
I HAVE BEEN ANXIOUS OR WORRIED FOR NO GOOD REASON: YES, VERY OFTEN
I HAVE BEEN SO UNHAPPY THAT I HAVE BEEN CRYING: NO, NEVER
I HAVE FELT SCARED OR PANICKY FOR NO GOOD REASON: NO, NOT MUCH
I HAVE BEEN ABLE TO LAUGH AND SEE THE FUNNY SIDE OF THINGS: AS MUCH AS I ALWAYS COULD
I HAVE FELT SAD OR MISERABLE: NOT VERY OFTEN
I HAVE BEEN SO UNHAPPY THAT I HAVE HAD DIFFICULTY SLEEPING: NOT VERY OFTEN
I HAVE LOOKED FORWARD WITH ENJOYMENT TO THINGS: AS MUCH AS I EVER DID
THE THOUGHT OF HARMING MYSELF HAS OCCURRED TO ME: NEVER

## 2023-04-20 ASSESSMENT — ANXIETY QUESTIONNAIRES
7. FEELING AFRAID AS IF SOMETHING AWFUL MIGHT HAPPEN: NOT AT ALL
2. NOT BEING ABLE TO STOP OR CONTROL WORRYING: SEVERAL DAYS
4. TROUBLE RELAXING: SEVERAL DAYS
6. BECOMING EASILY ANNOYED OR IRRITABLE: SEVERAL DAYS
5. BEING SO RESTLESS THAT IT IS HARD TO SIT STILL: SEVERAL DAYS
IF YOU CHECKED OFF ANY PROBLEMS ON THIS QUESTIONNAIRE, HOW DIFFICULT HAVE THESE PROBLEMS MADE IT FOR YOU TO DO YOUR WORK, TAKE CARE OF THINGS AT HOME, OR GET ALONG WITH OTHER PEOPLE: NOT DIFFICULT AT ALL
1. FEELING NERVOUS, ANXIOUS, OR ON EDGE: SEVERAL DAYS
3. WORRYING TOO MUCH ABOUT DIFFERENT THINGS: SEVERAL DAYS
GAD7 TOTAL SCORE: 6

## 2023-04-20 NOTE — TELEPHONE ENCOUNTER
Records not received from [facility].  I called [facility] and they don't have a signed release of information.  Laura will stop at [facility] on her way into her appointment today to sign a release and [facility] will get us the records.

## 2023-04-21 LAB
ALBUMIN MFR UR ELPH: 14.1 MG/DL (ref 1–14)
CREAT UR-MCNC: 78.2 MG/DL
PROT/CREAT 24H UR: 0.18 MG/MG CR (ref 0–0.2)

## 2023-04-24 PROBLEM — O99.013 ANEMIA DURING PREGNANCY IN THIRD TRIMESTER: Status: ACTIVE | Noted: 2023-04-24

## 2023-04-24 PROBLEM — K21.00 GASTROESOPHAGEAL REFLUX DISEASE WITH ESOPHAGITIS WITHOUT HEMORRHAGE: Status: ACTIVE | Noted: 2023-04-24

## 2023-04-24 PROBLEM — Z34.03 ENCOUNTER FOR SUPERVISION OF NORMAL FIRST PREGNANCY IN THIRD TRIMESTER: Status: ACTIVE | Noted: 2023-04-24

## 2023-04-24 PROBLEM — F32.A DEPRESSIVE DISORDER: Status: ACTIVE | Noted: 2021-01-04

## 2023-04-24 PROBLEM — R03.0 ELEVATED BLOOD PRESSURE READING WITHOUT DIAGNOSIS OF HYPERTENSION: Status: ACTIVE | Noted: 2023-04-24

## 2023-04-24 PROBLEM — O26.03 EXCESSIVE WEIGHT GAIN DURING PREGNANCY IN THIRD TRIMESTER: Status: ACTIVE | Noted: 2023-04-24

## 2023-04-24 NOTE — PROGRESS NOTES
Transfer of care Visit    [facility]: [facility]  Number of visits: 7  Initial visit date: 2022 at 7+2 weeks  Pre-preg wgt: 170 pounds  ABO/Rh: A+  Antibody screen: Negative  Rubella: Immune  RPR: Nonreactive  Hepatitis B surface antigen: Nonreactive  Hepatitis C antibody: Nonreactive  HIV antibody: Nonreactive  Pap smear: 2021 NILM  Initial and 28 wk hgb: 12.2/10.3  Initial platelets: 305,000  UC: No growth  1 hour GCT: 118 mg/dL  28-week RPR: Nonreactive  Reason for transfer CNM care desired      PRENATAL VISIT   FIRST TRANSFER OF CARE OBSTETRICAL EXAM - OB     Assessment / Impression   1.  28-year-old  1 para 0 with IUP at 33+4 weeks  2.  Pregravid BMI 32  3.  Tobacco dependence  4.  INGE stable on sertraline  5.  MDD stable on sertraline  6.  Antepartum anemia, mild  7.  More than expected weight gain in pregnancy  8.  History of elevated blood pressure reading in pregnancy x 1  9.  Gastroesophageal reflux   10.  Latex allergy    Plan:      -IOB labs reviewed.  All prenatal care records unavailable at the time of this appointment.  Release of information signed by patient and records sent for from [facility].  -Baseline HELLP labs not obtained prior to 20 weeks.  This writer recommends Labs today: CBC, CMP and urine protein creatinine ratio.  -Reviewed prenatal care schedule.   -Optimal nutrition and weight gain discussed. Pregnancy weight gain of 11-20 lbs (BMI 30.0 or 34.9) encouraged.   -Anticipatory guidance for common pregnancy questions and concerns reviewed.   -Danger s/sx for this trimester reviewed with patient.   -IOB packet given and reviewed with patient.   -CNM services and hospital options reviewed; emergency and scheduling phone numbers given to patient.   -Because the patient has risk factors but low-dose aspirin will be initiated because the patient is more than 28 weeks gestation.   -Antepartum VTE risk factors absent.  -Pt is not a candidate for an  antepartum OB consult.    -Return to clinic In 2 weeks or sooner as needed..    Total time: 60 minutes spent on the date of the encounter doing chart review, review of test results, patient visit and documentation.     Subjective:   Laura Corral is a 28 year old G 1 P 0 here today for her transfer of care obstetrical exam at 33 w 4 d. Here by herself.  Patient's last menstrual period was 2022., predicting an expected date of delivery of Estimated Date of Delivery: 2023. Last period was normal. Her previous three cycles were normal. Her pregnancy is dated by certain LMP.     The patient states that she is in a monogamous relationship and states that she is safe.      Risk factors: Tobacco use. Pregnancy Risk Factors:Elevated blood pressure reading in pregnancy    The patient has the following high risk factors for preeclampsia:none. The patient has the following moderate risk factors for preeclampsia:first pregnancy.     The patient has the following antepartum risk factors for VTE (two or more risk factors, or 1 * risk factor, places patient at higher risk): none.   Clinical history/risk factors requiring antepartum OB consult: none.     The patient has the following risk factors for diabetes: none.    Social History:   Education level: Some college, no degree  Occupation: Business administration  Partners name: Ino (at [employer])  ?   OB History    Para Term  AB Living   1 0 0 0 0 0   SAB IAB Ectopic Multiple Live Births   0 0 0 0 0      # Outcome Date GA Lbr Vu/2nd Weight Sex Delivery Anes PTL Lv   1 Current                 History:   Past Medical History:   Diagnosis Date     Chronic bilateral low back pain      High blood pressure due to oral contraceptive     Normotensive after discontinuing      Past Surgical History:   Procedure Laterality Date     WISDOM TOOTH EXTRACTION       ZZ CREATE EARDRUM OPENING,GEN ANESTH  1996      Family History   Problem  "Relation Age of Onset     Hypertension Mother      No Known Problems Father      No Known Problems Sister      Cardiovascular Maternal Grandmother 64        aortic aneur.     Psychotic Disorder Maternal Grandmother         depression     Pancreatic Cancer Maternal Grandmother      Cancer - colorectal Maternal Grandfather      Psychotic Disorder Maternal Grandfather         depression     Psychotic Disorder Paternal Grandmother         depression     Psychotic Disorder Paternal Grandfather         depression      Social History     Tobacco Use     Smoking status: Every Day     Packs/day: 0.25     Types: Cigarettes     Smokeless tobacco: Never     Tobacco comments:     Smokes less than 5 per day   Vaping Use     Vaping status: Never Used   Substance Use Topics     Alcohol use: Not Currently     Comment: rare     Drug use: No      Current Outpatient Medications   Medication Sig Dispense Refill     magnesium 250 MG tablet Take 1 tablet by mouth daily       omeprazole (PRILOSEC) 20 MG DR capsule Take 1 capsule (20 mg) by mouth daily 30 capsule 1     ondansetron (ZOFRAN) 4 MG tablet Take by mouth every 8 hours as needed for nausea       Prenatal Vit-Fe Fumarate-FA (PRENATAL MULTIVITAMIN W/IRON) 27-0.8 MG tablet Take 1 tablet by mouth daily       sertraline (ZOLOFT) 100 MG tablet TAKE 1 TABLET(100 MG) BY MOUTH DAILY IN ADDITION TO 25 MG TABLET TO= 125 MG due for appt with primary care provider before further fills 90 tablet 0     sertraline (ZOLOFT) 25 MG tablet TAKE 1 TABLET(25 MG) BY MOUTH DAILY IN ADDITION  MG TABLET TO= 125 MG provider appt due before further fills. 90 tablet 0      Allergies   Allergen Reactions     Latex Hives     Precaution        The patient's medical, surgical and family histories were reviewed and were pertinent to this visit.     INGE-7: 6, \"not difficult at all\"  EPDS: 9/30, 0 to #10  Pap smear: Last Pap: January 2021, Result: NILM    History of anxiety or depression: yes    Review of " Systems   General: Fatigue but otherwise denies problem   Eyes: Denies problem   Ears/Nose/Throat: Denies problem   Cardiovascular: Denies problem   Respiratory: Denies problem   Gastrointestinal: Nausea without vomiting, otherwise negative   Genitourinary: Denies any discharge, vaginal bleeding or itchiness or any other problem   Musculoskeletal: Breast tenderness otherwise denies problem   Skin: Denies problem   Neurologic: Denies problem   Psychiatric: Denies problem   Endocrine: Denies problem   Heme/Lymphatic: Denies problem   Allergic/Immunologic: Denies problem         Objective:   Objective    Vitals:    04/20/23 1215   BP: 128/84   Weight: 92.5 kg (204 lb)        Physical Exam:   General Appearance: Alert, cooperative, no distress, appears stated age   GAYLE: Normocephalic, without obvious abnormality, atraumatic. Conjunctiva/corneas clear   Neck: Symmetrical, trachea midline.   Back: Symmetric, ROM normal   Abdomen: Gravid, soft, non-tender.   FHT: 144 bpm  Pelvic exam: Deferred  Musculoskeletal: Extremities normal, atraumatic, no cyanosis   Skin: Skin color normal, no rashes or lesions   Neurologic: Alert and oriented x 3. Normal speech

## 2023-04-27 ASSESSMENT — ANXIETY QUESTIONNAIRES: GAD7 TOTAL SCORE: 6

## 2023-05-03 ENCOUNTER — PRENATAL OFFICE VISIT (OUTPATIENT)
Dept: MIDWIFE SERVICES | Facility: CLINIC | Age: 28
End: 2023-05-03
Payer: COMMERCIAL

## 2023-05-03 VITALS — HEART RATE: 87 BPM | OXYGEN SATURATION: 98 % | SYSTOLIC BLOOD PRESSURE: 122 MMHG | DIASTOLIC BLOOD PRESSURE: 74 MMHG

## 2023-05-03 DIAGNOSIS — Z34.03 ENCOUNTER FOR SUPERVISION OF NORMAL FIRST PREGNANCY IN THIRD TRIMESTER: Primary | ICD-10-CM

## 2023-05-03 PROCEDURE — 99207 PR PRENATAL VISIT: CPT | Performed by: ADVANCED PRACTICE MIDWIFE

## 2023-05-03 NOTE — PATIENT INSTRUCTIONS
"\"We hope you had a positive experience and that you can definitely recommend [facility] to your family and friends. You ll be receiving a survey soon and we look forward to hearing your feedback\".    [facility]  - Contact information:  Appointment line and to get a hold of CNM in clinic Monday-Friday 8 am - 5 pm:  [phone].  There are some clinics with early start times (1st appointment 7:40 am) and others with evening hours (last appointment 6:20 pm).  Most are typically open from 8 am to 5 pm.    CNM on call answering service: [phone].  Specify your hospital of choice and leave a brief message for CNM;  will then page CNM who is on call at your specified hospital and you should receive a call back with 15 minutes.  Be sure that your ringer is audible and that you can accept blocked calls so that we can get back in touch with you! This number should be reserved for urgent needs if during the day, before 8 am, after 5 pm, weekends, holidays.    Contact the on-call CNM with warning signs, such as:  vaginal bleeding   Vaginal discharge and itching or pain and burning during urination  Leg/calf pain or swelling on one side  severe abdominal pain  nausea and vomiting more than 4-5 times a day, or if you are unable to keep anything down  fever more than 100.4 degrees F.     Sisteerhart  After each of your visits you are welcome to check Stion for your visit summary including education and links to information relevant to your pregnancy and/or well woman care.   Find the \"Visits\" tab at the top of the page, you will see a list of recent visits and for each visit a for link for \"View After Visit Summary.\" View of your After Visit Summary will allow you to read our recommendations from your visit, review any education provided, and link to websites with useful information.   If you have any questions or difficulty navigating VMob, please feel free to " "contact us and we will do our best to direct you.  Meet the Midwives from [facility]  You are invited to an informational meet and greet with [facility]'s [facility] Certified Nurse-Midwives. Our free \"Meet the Midwives\" event is a great opportunity to learn about our midwives' philosophy and experience, the hospitals where we can assist with your birth, and answer questions you may have. Partners, friends, and family are welcome to attend. Currently, this is a virtual event.  Date  Last Thursday of every month at 7 pm.    Link to next (live) meeting  [email or web address]  To Join by Telephone (audio only) Call:   [phone] Phone Conference ID: [phone] #    Touring the Maternity Care Center  At this time we are offering a virtual tour of the Maternity Care Centers at both [facility] and [facility]:   [facility] Maternity Care:   [email or web address]--[facility] Maternity Care:   [email or web address]/[email or web address]    Scroll to the bottom of this hyperlink if the above link does not work      Postpartum Depression  The first weeks of caring for a  baby are more than a full-time job. Although it is often a happy time, your feelings and moods may not be what you expected. Many women experience  baby blues.  Here are some tips to help you understand when feelings of sadness are normal, and when you should call your health care provider.    What are the baby blues?  As many as 3 of every 4 women will have short periods of mood swings, crying, or feeling cranky or restless during the first weeks after birth. These feelings can be worse when you are tired or anxious. Women who have the baby blues often say they feel like crying but don t know why. Baby blues usually happen in the first or second week " postpartum (after you give birth) and last less than a week. If you are not sleeping, becoming more upset, don t feel like you can take care of your baby, or your sadness lasts 2 weeks or more, call your health care provider.    What is postpartum depression?  About one in every 5 women will develop depression during the first few months postpartum that may be mild, moderate, or severe. Women who have postpartum depression may have some of these symptoms:  Feeling guilty   Not able to enjoy your baby and feeling like you are not bonding with your baby    Not able to sleep, even when the baby is sleeping  Sleeping too much and feeling too tired to get out of bed  Feeling overwhelmed and not able to do what you need to during the day  Not able to concentrate  Don t feel like eating  Feeling like you are not normal or not yourself anymore  Not able to make decisions  Feeling like a failure as a mother  Feeling lonely or all alone  Thinking your baby might be better off without you  If you have any of these symptoms, call your health care provider!    Which symptoms of postpartum depression are dangerous?  Sometimes a woman with postpartum depression will have thoughts of harming herself or her baby. If you find yourself thinking about hurting yourself or your baby, call your health care provider immediately.    MOTHERHOOD: THE EARLY DAYS  You prepare for the birth of your baby for many months during pregnancy, and then the first months at home after your baby is born can be a quiet, gentle time of getting to know this new person who has come to live in your home. But for most women it is not all quiet or sweet. And for some women it is a very hard time.  What Can I Expect in the First Few Months After the Baby Comes?  New mothers and their families face many challenges in the first few months:  Your body and your hormones have to get back to normal.  You and the baby will be learning to breastfeed.  Babies only sleep a  few hours at a time. The entire family will have a hard time getting enough sleep.  You and your family need to learn how to parent this new family member.  If you have a partner, you have to figure out how to stay together as a couple and maybe even start to have sex again.  You may have to figure out how to keep from getting pregnant again right away.  You may need to return to work and find day care.    How Long Will it Take for My Body to Get Back to Normal?  Some changes will occur quickly. Others will not occur as quickly.  Your uterus, cervix, and vagina will all shrink to their nonpregnant size in about 2 weeks. Your vagina may be tender and dry for a few months--especially if you are breastfeeding.  If you had stitches or hemorrhoids, your   bottom   will be sore for 2 weeks or more.  For some women who have problems urinating, it can take several months for you to be able to hold your urine when you cough or sneeze or suddenly  something heavy.  Your breast milk will   come in   2 to 3 days after the birth of your baby. It will take 6 to 8 weeks for you and the baby to get the hang of breastfeeding and find a pattern. During these first weeks, you can have engorged breasts at times and often leak milk.  Your stomach and intestines all have to fall back into place. You may have a lot of gas for a few weeks.  You may be constipated--especially if you are breastfeeding.  Your stretched stomach muscles can recover in a few weeks, but for some women it takes longer--6 months or a year--to recover.  If you had a  delivery, you may have pain or numbness around the incision for 6 months or more.  Losing the weight you gained during pregnancy will probably take 6 months to a year. Have patience! It took 40 weeks to get here. Give yourself 40 weeks to get back.    What Can I Expect When My Hormones Change?  About 75% of all women will get the   blues.   This usually starts about 3 days after the birth  of your baby. You may cry easily and feel very, very tired. A few women become very depressed. If you had a  delivery or your new baby was sick, you are at a higher risk for depression.  Call your health care provider right away if you cannot care for yourself or your baby, if you feel very nervous or worried, if you cannot stop crying, or if you are having thoughts of hurting yourself or your baby.    Taking Care of Yourself  While you are still pregnant:  Talk with your partner and your family about the time ahead. Arrange for someone to help you during the first weeks at home if you can.  Talk with your health care provider about birth control options and make a plan before the baby comes.  If you are worried about how to parent a , take parenting classes. You will learn a lot about how babies act and you will make some friends who are going through the same thing at the same time. Most [facility] have these classes.  Arrange for someone to help with baby care if you can.  After the baby comes:  Ask for help. Let other people do the cooking and cleaning and run the house. Focus on yourself and your baby.  Sleep whenever you can. Try not to be tempted to   get some things done   when the baby sleeps. This is your time to sleep, too.  Drink lots of water. You will need at least 6 big glasses of water everyday to avoid constipation and make enough breast milk. Every time you sit down to breastfeed, have a big glass of water with you to drink while you are nursing.  Eat lots of vegetables and fruit. You will need lots of vitamins and fiber to help your body get back to normal. This will also help you avoid constipation.  Go outside and walk. Babies can go outside even if it is very cold. Fresh air and sunshine will do you both good.  Take sitz baths. Put about 6 inches of warm water in your bathtub and sit in there for 15 minutes 2 to 3 times a day. This will help your   bottom   heal more quickly. It  will also give you 15 minutes of private time!  Talk to other mothers. Join a new parents group. Call Gilberto and go to [facility] meetings if you are breastfeeding.     With your partner:  Keep talking. Share the experience.  Spend time alone. Even a 30-minute walk can be a date.  Start a birth control method. You can get pregnant before you even have a period. It is very important to use birth control if you do not want to get pregnant again right away.  When you have sex, use a lubricant. A lot of lubricant! Take it slow.  The first few months after a baby comes can be a lot like floating in a jar of honey--very sweet and caba, but very sticky, too. Take time to enjoy the good parts. Remind yourself that this time will pass. Bon voyage!    FOR MORE INFORMATION  For questions about depression during and after pregnancy:  http://www.womenshealth.gov/publications/our-publications/fact-sheet/depression-pregnancy.html   After birth: The first 6 weeks:  http://www.BookingBug/Post-Birth-and-Recovery   Breastfeeding resources:  http://www.[employer].org/health-info/getting-breastfeeding-off-to-a-good-start/    Preparing for your baby:       Car Seat Clinics:  https://dps.[place].gov/divisions/ots/child-passenger-safety/Pages/car-seat-checks.aspx    [place] Safety Hallieford: http://www.[place]afetycouncil.org/family/carseatindex.cfm    Child Passenger Safety: Buckle Up Right    When child safety seats and safety belts are used correctly, they can reduce the risk of death by up to 70%. But finding the right combination can be confusing.  How do you know if you should be using an infant-only seat or a convertible seat?  What are booster seats and why do kids need them until they're eight years old or are four feet nine inches tall?  How do you know when a child is ready for your car's safety belt/shoulder strap?  The American Academy of Pediatrics (AAP) recommends that all infants and toddlers should ride  in a Rear-Facing Car Safety Seat until they are two (2) years of age or until they reach the highest weight or height allowed by their car safety seat's .    A child who is both under age 8 and shorter than 4 feet 9 inches is required to be fastened in a child safety seat that meets federal safety standards. Under this law, a child cannot use a seat belt alone until they are age 8, or 4 feet 9 inches tall. It is recommended to keep a child in a booster based on their height rather than their age. Check the instruction book or label of the child safety seat to be sure it is the right seat for your child's weight and height.    www.CarSeatsMadeSimple.org    Car Seat Recycling, -    Get free expert help in a [place] community near you:  [place] Child Passenger Safety Checkup Clinic Calendar    How to Find the Right Car Seat (NHTSA)  Safe Kids [place]    Print Materials  Basic Car Seat Safety checklist  Don't Skip a Step brochure (English); (Korean); (Angolan)  Good Going! Adventures in Safety magazine  Fact Sheets  Booster Seat Safety  Outdated and Used Child Safety Seats  A Parents' Checklist: Traffic Safety  Driving Your Child to School  Occupant Protection (Safety Belts and Child Safety Seats): Frequently Asked Questions; Misconceptions and Myths; [place] Laws  Air Bags: How Do Air Bags Work; Frequently Asked Questions      Immunizations:  http://www.cdc.gov/vaccines/schedules/easy-to-read/child.html    [place] Screening Program  Http://www.health.[facility].mn.us/newbornscreening/  [place] newborns are tested soon after birth for more than 50 hidden, rare disorders, including hearing loss and critical congenital heart disease (CCHD). This site provides resources and information for families and providers.    Ask your health care provider about vaccinations you may need following delivery. By now, you should have received a Tdap immunization to protect against pertussis or whooping cough.  Fathers and family members who will be in close contact with the baby should also receive a Tdap shot at least two weeks before the expected birth of the baby if they have not had a Td (tetanus) shot for at least two years.    Your midwife may offer to check your cervix for changes. If you are past your due date, discuss the next steps leading to delivery with your midwife. If you don't start labor on your own by 41 or 42 weeks, your midwife may recommend giving you medicines to ripen your cervix and start labor.  Induction of labor: http://onlinelibrary.mcclain.com/store/10.1016/j.jmwh.2008.04.018/asset/j.jmwh.2008.04.018.pdf?v=1&t=rqpm6qsg&g=[email or web address]    Tell your midwife or physician how you plan to feed your baby (breast or bottle), who you have chosen to do pediatric care for your baby, and if you have a boy, whether you have chosen to have him circumcised. You will need a car seat correctly installed in your vehicle to bring your baby home. As you start to set up the nursery at home for your baby, make sure the crib is safe. The mattress needs to fit snugly against the edges of the crib. If you can fit a soda can between the bars, they are too far apart and can allow the baby's head to caught between them.    Learn about infant care and feeding, including information about infant CPR. We recommend that you put your baby to sleep on his or her back to reduce the chance of Sudden Infant Death Syndrome (SIDS). To maintain a healthy environment in which your child can grow, it's best to keep your home smoke-free. By preparing ahead, your transition into parenthood will go smoothly for you and your baby.    Your midwife will want to see you for a checkup 2 to 6 weeks after delivery.      Making Plans for Feeding My Baby    By this point, you probably have read a lot about feeding your baby.  Breastfeed or formula? Each mother s decision is her own and [facility] respects you and your choices. We ve gathered information on both breastfeeding and formula feeding to help with your decision. Talking with your physician or nurse-midwife can also help in your decision.  However you plan to feed your baby, [facility] encourage rooming in with your baby, skin-to-skin contact and feeding your baby based on his or her cues.    Skin-to-skin contact  Being close to mom helps your baby adjust to life outside of the womb.  It helps your baby regulate their body temperature, heart rate, and breathing.  Your baby will usually be placed skin-to-skin immediately following birth or as soon as possible, if medical intervention is needed.    Rooming-In  Having your baby stay with you in your room is called  rooming-in .  Keeping your baby in your room helps you to learn how to care for your baby by getting to know your baby s cues, body rhythms and sleep cycle.       Cue-based feeding  Cues (signals) are baby s way of telling you what he or she wants.  When you learn your infant s cues, you know how to care for and feed your baby.   Feeding cues are the licking and smacking of lips, bringing their fist to their mouth, and a reflex called  rooting - where baby turns and opens his or her mouth, searching for the breast or bottle.  Crying is a late feeding cue.  Babies can feed frequently, often at least 8 times in 24 hours.  Breastfeeding facts  Breast milk is the best source of nutrition for your baby and is available at birth. In the first couple of days, your milk volume is already starting to increase, though it may not be noticeable. Breastfeed frequently to increase your milk supply. Within three to five days, you will begin to notice larger milk volumes. An increase in breast size, heaviness and firmness are often described as the milk  coming in.  Frequent breastfeeding can help breasts from getting overly firm and painful. You will know the baby  is getting enough milk if your baby is having wet and dirty diapers and gaining weight.     If your goal is to exclusively breastfeed, it is important to not use any formula or artificial nipples (including bottles and pacifiers) while your baby is learning to breastfeed.  While it may seem like an  easy  option to give your baby a bottle, formula should only be given if there is a medical reason for your baby to have it.    Positioning and attachment   Get comfortable.  Use pillows as needed to support your arms and baby.  Hold baby close at the level of your breast, facing you in a tummy to tummy position.  Skin to skin helps with this.  Position the baby with his or her nose by the nipple.  There should be a straight line from baby s ear to shoulder to hips.  Tickle your baby s lips or wait for baby to open mouth wide, bring baby to breast by leading with the chin.  Aim the nipple at the roof of baby s mouth.  A rapid sucking pattern is followed by longer, drawing pattern with occasional swallows heard.  When baby is correctly latched, your nipple and much of the areola are pulled well into baby s mouth.      Returning to work or school  Focus on a good start to breastfeeding.  Many women continue to provide breastmilk for their baby when they return to work or school.  Making plans about where to pump and store milk can make the transition go well.  Talk with other mothers who have also returned to work or school for tips and support.  Your employer s Human Resource department may be a resource as well.     Returning to work or school: (continued)   babies can mean fewer  sick  days for you.  A quality breast pump will also save time and add comfort.  Check with your insurance prior to giving birth for breast pump coverage.  Many insurance companies include a pump within your benefits.  Wait until your baby is at least three weeks old to introduce a bottle for the first time.  Have someone besides you  give the bottle.  Breastfeed when you are with your baby. Reserve your bottles of breast milk for when you are away.   Your breasts will need to be  emptied  either by your baby or a pump.  Plan to pump at least twice in an eight hour day.  If you cannot pump at work, continue breastfeeding at home. Any amount of breast milk is worth giving to your baby.    Formula feeding facts  If you are planning to use formula to feed your baby, you will want to make some preparations ahead of time. Talk to your doctor or nurse-midwife about what type of formula to use. Some are iron-fortified, meaning they have extra iron in them. You will want to purchase formula and bottles before your baby is born to be sure you are ready after you return from the hospital. The [facility] do not provide formula samples to take home.    Be sure to follow formula mixing directions closely. Regular milk in the dairy case at the grocery store should not be given to babies under 1 year old. Baby formula is sold in several forms including:  Ready-to-use. This is the most expensive, but no mixing is necessary.  Concentrated liquid. This is less expensive than ready-to-use and you mix with water.  Powder. This is the least expensive. You mix one level scoop of powdered formula with two ounces of water and stir well.    Most babies need 2.5 ounces of formula per pound of body weight each day. This means an 8-pound baby may drink about 20 ounces of formula a day; however, this is just an estimate. The most important thing is to pay attention to your baby s cues.  If your baby is always fussy, needs more iron or has certain food allergies, your physician may suggest you change your baby s formula to a different kind.     How do I warm my baby s bottles?  You may feed your baby a bottle without warming it first. It is OK for the breast milk or formula to be cool or room temperature. If your baby seems to prefer it warmed, you can put the  filled bottle in a container of warm water and let it stand for a few minutes. Check the temperature of the liquid on your skin before feeding it to your baby; to be sure it isn t too hot. Do not heat bottles in the microwave. Microwaves heat food and liquids unevenly, and this can cause hot spots that can burn your baby.    How do I clean and sterilize bottles?  Sterilize bottles and nipples before you use them for the first time. You can do this by putting them in boiling water for 5 minutes. After that first time, you can wash them in hot and soapy water. Rinse them carefully to be sure there is no soap left on them. You can also wash them in the .    Childbirth and Parenting Education:       Everyday Miracles:   https://www.everyday-miracles.org/    Free Video Series from [facility]: https://nursing.[facility]/academics/specialty-areas/nurse-midwifery/having-baby-prenatal-videos/having-baby-prenatal-and    Childbirth Education virtual (live) classes: www.[employer]/classes  The Birth Hour: https://impok/online-childbirth-class/  BirthED: https://www.birthedmn.com/  SHANNON parenting center: http://[facility] [employer]/   (605) 163-BABY  [place]: (education, yoga & wellness) www.[place]  Enlightened Mama: www.enlightenedmama.D-Share   Childbirth collective: (Parent topic nights)  www.childbirthcollective.org/  Hypnobabies:  www.hypnobabiestwincities.com/  Hypnobirthing:  Http://hypnobirthing.D-Share/  Hypnobirthing virtual class: www.[employer]/hypnobirthing    Information about doulas:  Childbirth collective: http://www.childbirthcollective.org/  Doulas of [place] (SHEEBA):  www.sheeba.org  [employer] [facility]  project: http://Speedmenttiesdoulaproject.com/     Early Childhood and Family Education (ECFE):  ECFE offers parents hands-on learning experiences that will nourish a lifetime of teachable moments.  http://ecfe.info/ecfe-home/    APPS and Podcasts:   Ovia  Glow  "Nurture    Evidence Based Birth  The Birth Hour (for birth stories)   Birthful   Expectful   The Longest Shortest Time  PregnancyPodcast Dena Faridahazel    Book Recommendations:   Nella Brook's Birthing From Within--first few chapters include a new-age tone, you may prefer to skip it and keep going, because there is good stuff later.  This book recommendation covers emotional preparation, but does cover coping with pain, and use of both pharmacological and nonpharmacological methods.    Dr. Hand' The Pregnancy Book and The Birth Book--the pregnancy book goes month-by month    The Birth Partner by Leydi Welch    Womanly Art of Breastfeeding by La Leche League International   Bestfeeding by Emmy Weiner--great pictures    Mothering Your Nursing Toddler, by Heather Blanco.   Addresses dealing with so many of the challenging behaviors of a nursing toddler.  How Weaning Happens, by La Leche League.  Discusses weaning at all ages, from medically necessary weaning of an infant, all the way up to age 5 (or older), with why/why not, and strategies.  Very empowering book both for deciding to wean and deciding not to.    American College of Nurse-Midwives (ACNM) http://www.midwife.org/; look at the informational handouts at http://www.midwife.org/Share-With-Women     www.mymidwife.org    Mother to Baby (Medication and Herbal guidance in pregnancy): http://www.mothertobaby.org  Toll-Free Hotline: 573.967.2208  LactMed (Medication use while breastfeeding): http://toxnet.nlm.nih.gov/newtoxnet/lactmed.htm    Women's Health.gov:  http://www.womenshealth.gov/a-z-topics/index.html    American pregnancy association - http://americanpregnancy.org    Centering Pregnancy (group prenatal care option): http://centeringhealthcare.org    March of Dimes www.Wipster     FDA - Nutrition  www.mypyramid.gov  Under \"For Consumers,\" click on \"pregnant and breastfeeding women.\"      Centers for Disease Control and Prevention (CDC) - " Vaccines : http://www.cdc.gov/vaccines/       When researching information on the web, question the validity of websites.  The Doctor Evidence .gov, .edu and.org tend to be more reliable information.  If there are a lot of advertisements, be cautious of the information provided. Stay away from blogs and chat rooms please!     Community Breastfeeding Support    Early Childhood Family [place] [address] provides a free, drop-in class/breastfeeding support group, facilitated by a lactation consultant and .  During the group you can connect with other parents, weigh your baby, ask questions about feeding and baby development, and more.  You do not need to register.  Fall in-person meetings will begin on , are for parents of babies from birth to 9 months, and will meet on Monday evenings from 6 - 7:15 pm in [facility], which is at [address] [place] [address].  [place] [address] also offers virtual group meetings with a lactation consultant/.  These take place on , from 11:30 am - 12:30 pm for parents of newborns to 3-month-olds, and from 4:15 - 5:15 for parents of 3 - 9 - month olds.  To get the meeting link contact Carmencita العراقي at [phone].    [facility] offers a free, drop-in breastfeeding support group facilitated by ANDRIY Oro.   at [facility] [address], [place].  A scale is available to check baby weights, if desired.  [facility] also has a variety of new parent classes:  (cost for registration)  https://[facility] Children's HospitalEventCombo.NanoPotential/classes/    [facility] Lactation [facility] facilitated by ANDRIY Sanchez:  Free, drop-in support group for breastfeeding, with baby scale available if needed.  Meets at [facility], second Tuesday of each month, 10 am - 12 pm.  Text [phone] for info.    [facility] Support Group,  at 10:30 am   Run by ANDRIY Novoa of [facility] Lactation  "Consultants   Go to [facility] Lactation Consultants Facebook page, click on \"events\" for link:   Https://www."[employer].com/events/[email or web address]/    [facility] breastfeeding support community:  free, drop-in breastfeeding support facilitated by Certified Lactation Counselors, open Mondays and Wednesdays from 1 pm - 5 pm.  In [place]:  [facility], [address] [facility]:   guidingarisiahta.org    [facility] Milk Hour, Thursdays at 2:30 pm    Run by Joseph Novak IBCLC  Go to [facility] FB page and send message to get link   Https://www."[employer]."[employer] ([place] [employer])"/Sociable LabsundFlexEnergy/    Laila Elaine:  http://www.TuneIn Twitter Dashboard.Propers/    [place] offers a Lactation Lounge every Friday 12pm - 1pm, run by Laila Robertson Leader.  Sign up via link at [employer]/cbe-lactation   https://www.Open Air Publishing/cbe-lactation    [facility] is offering virtual support groups every Monday, 10:30 am - 12 pm, run by RN IBCLC: Https://www."[employer].com/events/[email or web address]/    Culturally-Specific Breastfeeding Support:     For Hmong Families:   The Hmong Breastfeeding Coalition is a wonderful support for [place] Hmong women who are breastfeeding.  They are best found on Facebook.    for Black families:    Chocolate Milk Club:  http://www.El[employer]."[employer] ([place])"/chocolate-milk-club/  Email: Leonard@[employer]    R.O.S.E. = Reaching our Sisters Everywhere  Http://www.breastfeedingrose.org/    Club Mom breastfeeding support for Black mothers:  Contact Jena Owusu  Phone: [phone]   Email:  [email or web address]@[facility]     Kendra Mas  Phone: [phone]   Email:  [email or web address]    Club Dad parent support for Black fathers:   Contact Mp Olguin   Phone: [phone]   Email:  [email or web address].    For Native/Indigenous " "Families:    https://www.RxApps.com/groups/nitamising.gimashkikinaan     Additional Resources:  La Leche Harir is an international, nonprofit, nonsectarian organization offering information, education, and support to mothers who want to breast-feed their babies. Local groups offer phone help and monthly meetings. Visit [employer]org or Wurldtech.org and us the  Find local support  drop down menu or click on the  Resources  tab.    [place] Breastfeeding Resources: [phone] (2229) toll free    National Breastfeeding Help Line trained breastfeeding peer counselors can help answer common breast-feeding questions by phone. Monday-Friday: English/Portuguese  7-657- [phone] toll free, [phone] (TTY)    Virtual Breastfeeding Support:    During this time of isolation, breastfeeding families need even more community!  Here are some area organizations offering virtual support groups for breastfeeding:    [facility] Support Group, Tuesdays at 10:30 am   Run by ANDRIY Novoa of [facility] Lactation Consultants   Go to [facility] Lact[facility] Facebook page and click on \"events\" for link   https://www.RxApps.com/events/917024624725256/  [facility] Milk Hour, Thursdays at 2:30 pm    Run by ANDRIY Nam   Go to [facility] FB page and send message to get link   https://www.RxApps.com/healthfoundations/  [facility]/[place] holding virtual meetings the first Tuesday of each month, 8-9 pm, and the   Third Saturday, 10 - 11 am.  Go to Community Health FB page; message to get link [email or web address]://[email or web address]/?hc_location=[facility]  [place] offers a Lactation Lounge every Friday 12pm - 1pm, run by Celeste RobertsonAd[facility] Leader   Sign up via link at https://www.[email or web address].[place]/cbe-lactation  [facility] is offering virtual support groups every " Monday, 10:30 am - 12 pm, run by nurse ANDRIY   Https://www.facebook.com/events/012805092319432/    Prenatal Breastfeeding Classes:      [facility] is offering virtual breastfeeding and  care classes:  https://www.IsoPlexis.WinAd/education-workshops  [facility] childbirth and breastfeeding education offering virtual prenatal breastfeeding classes  https://www.birthedmn.com/workshops

## 2023-05-03 NOTE — PROGRESS NOTES
Laura presents with friend Chelsea and her daughter, as well as her mother who is a longtime RN at [facility] [facility] [employer]  for a routine PNV at 35w3d.  Feeling overall well but fatigued.  Discussed:  1.) Updated CNM details  2.) Prenatal records still not available to review.  3.) YOUSIF reason- wanting relationship with providers, more time in visits, easier time scheduling appointments.  4) Insomnia, particularly from 2-4 am.  Unisom and magnesium not helping, does not like lavendar.  Suggested trying calming essential oil and/or unisom when waking (verses before going to sleep.    Reviewed maternal growth chart.    RTC 1 week, sooner as needed.    NV: GBS and Hgb  Has CNM numbers and aware to call with DFM, LOF, PTL, or any questions or concerns.    SIM Joyce CNM    5/2/2023   10:15 AM

## 2023-05-10 ENCOUNTER — PRENATAL OFFICE VISIT (OUTPATIENT)
Dept: MIDWIFE SERVICES | Facility: CLINIC | Age: 28
End: 2023-05-10
Payer: COMMERCIAL

## 2023-05-10 VITALS
WEIGHT: 208 LBS | DIASTOLIC BLOOD PRESSURE: 74 MMHG | HEART RATE: 64 BPM | BODY MASS INDEX: 39.3 KG/M2 | SYSTOLIC BLOOD PRESSURE: 124 MMHG

## 2023-05-10 DIAGNOSIS — Z34.03 ENCOUNTER FOR SUPERVISION OF NORMAL FIRST PREGNANCY IN THIRD TRIMESTER: Primary | ICD-10-CM

## 2023-05-10 DIAGNOSIS — D64.9 ANEMIA, UNSPECIFIED TYPE: Primary | ICD-10-CM

## 2023-05-10 LAB — HGB BLD-MCNC: 10.8 G/DL (ref 11.7–15.7)

## 2023-05-10 PROCEDURE — 87653 STREP B DNA AMP PROBE: CPT | Performed by: ADVANCED PRACTICE MIDWIFE

## 2023-05-10 PROCEDURE — 36415 COLL VENOUS BLD VENIPUNCTURE: CPT | Performed by: ADVANCED PRACTICE MIDWIFE

## 2023-05-10 PROCEDURE — 99207 PR PRENATAL VISIT: CPT | Performed by: ADVANCED PRACTICE MIDWIFE

## 2023-05-10 PROCEDURE — 85018 HEMOGLOBIN: CPT | Performed by: ADVANCED PRACTICE MIDWIFE

## 2023-05-10 NOTE — PATIENT INSTRUCTIONS
"\"We hope you had a positive experience and that you can definitely recommend [facility] to your family and friends. You ll be receiving a survey soon and we look forward to hearing your feedback\".    [facility]  - Contact information:  Appointment line and to get a hold of CNM in clinic Monday-Friday 8 am - 5 pm:  [phone].  There are some clinics with early start times (1st appointment 7:40 am) and others with evening hours (last appointment 6:20 pm).  Most are typically open from 8 am to 5 pm.    CNM on call answering service: [phone].  Specify your hospital of choice and leave a brief message for CNM;  will then page CNM who is on call at your specified hospital and you should receive a call back with 15 minutes.  Be sure that your ringer is audible and that you can accept blocked calls so that we can get back in touch with you! This number should be reserved for urgent needs if during the day, before 8 am, after 5 pm, weekends, holidays.    Contact the on-call CNM with warning signs, such as:  vaginal bleeding   Vaginal discharge and itching or pain and burning during urination  Leg/calf pain or swelling on one side  severe abdominal pain  nausea and vomiting more than 4-5 times a day, or if you are unable to keep anything down  fever more than 100.4 degrees F.     [employer]  After each of your visits you are welcome to check [employer] for your visit summary including education and links to information relevant to your pregnancy and/or well woman care.   Find the \"Visits\" tab at the top of the page, you will see a list of recent visits and for each visit a for link for \"View After Visit Summary.\" View of your After Visit Summary will allow you to read our recommendations from your visit, review any education provided, and link to websites with useful information.   If you have any questions or difficulty navigating [facility]VC, please feel free to " "contact us and we will do our best to direct you.  Meet the Midwives from [facility]  You are invited to an informational meet and greet with [facility]'s [facility] Certified Nurse-Midwives. Our free \"Meet the Midwives\" event is a great opportunity to learn about our midwives' philosophy and experience, the hospitals where we can assist with your birth, and answer questions you may have. Partners, friends, and family are welcome to attend. Currently, this is a virtual event.  Date  Last Thursday of every month at 7 pm.    Link to next (live) meeting  [email or web address]  To Join by Telephone (audio only) Call:   [phone] Phone Conference ID: [phone] #    Touring the Maternity Care Center  At this time we are offering a virtual tour of the Maternity Care Centers at both [facility] and [facility]:   [facility] Maternity Care:   [email or web address]--[facility] Maternity Care:   [email or web address]    Scroll to the bottom of this hyperlink if the above link does not work      Postpartum Depression  The first weeks of caring for a  baby are more than a full-time job. Although it is often a happy time, your feelings and moods may not be what you expected. Many women experience  baby blues.  Here are some tips to help you understand when feelings of sadness are normal, and when you should call your health care provider.    What are the baby blues?  As many as 3 of every 4 women will have short periods of mood swings, crying, or feeling cranky or restless during the first weeks after birth. These feelings can be worse when you are tired or anxious. Women who have the baby blues often say they feel like crying but don t know why. Baby blues usually happen in the first or second week " postpartum (after you give birth) and last less than a week. If you are not sleeping, becoming more upset, don t feel like you can take care of your baby, or your sadness lasts 2 weeks or more, call your health care provider.    What is postpartum depression?  About one in every 5 women will develop depression during the first few months postpartum that may be mild, moderate, or severe. Women who have postpartum depression may have some of these symptoms:  Feeling guilty   Not able to enjoy your baby and feeling like you are not bonding with your baby    Not able to sleep, even when the baby is sleeping  Sleeping too much and feeling too tired to get out of bed  Feeling overwhelmed and not able to do what you need to during the day  Not able to concentrate  Don t feel like eating  Feeling like you are not normal or not yourself anymore  Not able to make decisions  Feeling like a failure as a mother  Feeling lonely or all alone  Thinking your baby might be better off without you  If you have any of these symptoms, call your health care provider!    Which symptoms of postpartum depression are dangerous?  Sometimes a woman with postpartum depression will have thoughts of harming herself or her baby. If you find yourself thinking about hurting yourself or your baby, call your health care provider immediately.    MOTHERHOOD: THE EARLY DAYS  You prepare for the birth of your baby for many months during pregnancy, and then the first months at home after your baby is born can be a quiet, gentle time of getting to know this new person who has come to live in your home. But for most women it is not all quiet or sweet. And for some women it is a very hard time.  What Can I Expect in the First Few Months After the Baby Comes?  New mothers and their families face many challenges in the first few months:  Your body and your hormones have to get back to normal.  You and the baby will be learning to breastfeed.  Babies only sleep a  few hours at a time. The entire family will have a hard time getting enough sleep.  You and your family need to learn how to parent this new family member.  If you have a partner, you have to figure out how to stay together as a couple and maybe even start to have sex again.  You may have to figure out how to keep from getting pregnant again right away.  You may need to return to work and find day care.    How Long Will it Take for My Body to Get Back to Normal?  Some changes will occur quickly. Others will not occur as quickly.  Your uterus, cervix, and vagina will all shrink to their nonpregnant size in about 2 weeks. Your vagina may be tender and dry for a few months--especially if you are breastfeeding.  If you had stitches or hemorrhoids, your   bottom   will be sore for 2 weeks or more.  For some women who have problems urinating, it can take several months for you to be able to hold your urine when you cough or sneeze or suddenly  something heavy.  Your breast milk will   come in   2 to 3 days after the birth of your baby. It will take 6 to 8 weeks for you and the baby to get the hang of breastfeeding and find a pattern. During these first weeks, you can have engorged breasts at times and often leak milk.  Your stomach and intestines all have to fall back into place. You may have a lot of gas for a few weeks.  You may be constipated--especially if you are breastfeeding.  Your stretched stomach muscles can recover in a few weeks, but for some women it takes longer--6 months or a year--to recover.  If you had a  delivery, you may have pain or numbness around the incision for 6 months or more.  Losing the weight you gained during pregnancy will probably take 6 months to a year. Have patience! It took 40 weeks to get here. Give yourself 40 weeks to get back.    What Can I Expect When My Hormones Change?  About 75% of all women will get the   blues.   This usually starts about 3 days after the birth  of your baby. You may cry easily and feel very, very tired. A few women become very depressed. If you had a  delivery or your new baby was sick, you are at a higher risk for depression.  Call your health care provider right away if you cannot care for yourself or your baby, if you feel very nervous or worried, if you cannot stop crying, or if you are having thoughts of hurting yourself or your baby.    Taking Care of Yourself  While you are still pregnant:  Talk with your partner and your family about the time ahead. Arrange for someone to help you during the first weeks at home if you can.  Talk with your health care provider about birth control options and make a plan before the baby comes.  If you are worried about how to parent a , take parenting classes. You will learn a lot about how babies act and you will make some friends who are going through the same thing at the same time. Most [facility] have these classes.  Arrange for someone to help with baby care if you can.  After the baby comes:  Ask for help. Let other people do the cooking and cleaning and run the house. Focus on yourself and your baby.  Sleep whenever you can. Try not to be tempted to   get some things done   when the baby sleeps. This is your time to sleep, too.  Drink lots of water. You will need at least 6 big glasses of water everyday to avoid constipation and make enough breast milk. Every time you sit down to breastfeed, have a big glass of water with you to drink while you are nursing.  Eat lots of vegetables and fruit. You will need lots of vitamins and fiber to help your body get back to normal. This will also help you avoid constipation.  Go outside and walk. Babies can go outside even if it is very cold. Fresh air and sunshine will do you both good.  Take sitz baths. Put about 6 inches of warm water in your bathtub and sit in there for 15 minutes 2 to 3 times a day. This will help your   bottom   heal more quickly. It  will also give you 15 minutes of private time!  Talk to other mothers. Join a new parents group. Call Gilberto and go to [facility] meetings if you are breastfeeding.     With your partner:  Keep talking. Share the experience.  Spend time alone. Even a 30-minute walk can be a date.  Start a birth control method. You can get pregnant before you even have a period. It is very important to use birth control if you do not want to get pregnant again right away.  When you have sex, use a lubricant. A lot of lubricant! Take it slow.  The first few months after a baby comes can be a lot like floating in a jar of honey--very sweet and caba, but very sticky, too. Take time to enjoy the good parts. Remind yourself that this time will pass. Bon voyage!    FOR MORE INFORMATION  For questions about depression during and after pregnancy:  http://www.womenshealth.gov/publications/our-publications/fact-sheet/depression-pregnancy.html   After birth: The first 6 weeks:  http://www.[employer]/Post-Birth-and-Recovery   Breastfeeding resources:  http://www.Nexalogy.org/health-info/getting-breastfeeding-off-to-a-good-start/    Preparing for your baby:       Car Seat Clinics:  https://dps.[place].gov/divisions/ots/child-passenger-safety/Pages/car-seat-checks.aspx    [place] Safety Welches: http://www.[place]afetycouncil.org/family/carseatindex.cfm    Child Passenger Safety: Buckle Up Right    When child safety seats and safety belts are used correctly, they can reduce the risk of death by up to 70%. But finding the right combination can be confusing.  How do you know if you should be using an infant-only seat or a convertible seat?  What are booster seats and why do kids need them until they're eight years old or are four feet nine inches tall?  How do you know when a child is ready for your car's safety belt/shoulder strap?  The American Academy of Pediatrics (AAP) recommends that all infants and toddlers should ride  in a Rear-Facing Car Safety Seat until they are two (2) years of age or until they reach the highest weight or height allowed by their car safety seat's .    A child who is both under age 8 and shorter than 4 feet 9 inches is required to be fastened in a child safety seat that meets federal safety standards. Under this law, a child cannot use a seat belt alone until they are age 8, or 4 feet 9 inches tall. It is recommended to keep a child in a booster based on their height rather than their age. Check the instruction book or label of the child safety seat to be sure it is the right seat for your child's weight and height.    www.CarSeatsMadeSimple.org    Car Seat Recycling, -    Get free expert help in a [place] community near you:  [place] Child Passenger Safety Checkup Clinic Calendar    How to Find the Right Car Seat (NHTSA)  Safe Kids [place]    Print Materials  Basic Car Seat Safety checklist  Don't Skip a Step brochure (English); (Belarusian); (Dutch)  Good Going! Adventures in Safety magazine  Fact Sheets  Booster Seat Safety  Outdated and Used Child Safety Seats  A Parents' Checklist: Traffic Safety  Driving Your Child to School  Occupant Protection (Safety Belts and Child Safety Seats): Frequently Asked Questions; Misconceptions and Myths; [place] Laws  Air Bags: How Do Air Bags Work; Frequently Asked Questions      Immunizations:  http://www.cdc.gov/vaccines/schedules/easy-to-read/child.html    [place] Screening Program  Http://www.health.[facility].mn.us/newbornscreening/  [place] newborns are tested soon after birth for more than 50 hidden, rare disorders, including hearing loss and critical congenital heart disease (CCHD). This site provides resources and information for families and providers.    Ask your health care provider about vaccinations you may need following delivery. By now, you should have received a Tdap immunization to protect against pertussis or whooping cough.  Fathers and family members who will be in close contact with the baby should also receive a Tdap shot at least two weeks before the expected birth of the baby if they have not had a Td (tetanus) shot for at least two years.    Your midwife may offer to check your cervix for changes. If you are past your due date, discuss the next steps leading to delivery with your midwife. If you don't start labor on your own by 41 or 42 weeks, your midwife may recommend giving you medicines to ripen your cervix and start labor.  Induction of labor: http://onlinelibrary.mcclain.com/store/10.1016/j.jmwh.2008.04.018/asset/j.jmwh.2008.04.018.pdf?v=1&t=qrpb0sro&f=[email or web address]    Tell your midwife or physician how you plan to feed your baby (breast or bottle), who you have chosen to do pediatric care for your baby, and if you have a boy, whether you have chosen to have him circumcised. You will need a car seat correctly installed in your vehicle to bring your baby home. As you start to set up the nursery at home for your baby, make sure the crib is safe. The mattress needs to fit snugly against the edges of the crib. If you can fit a soda can between the bars, they are too far apart and can allow the baby's head to caught between them.    Learn about infant care and feeding, including information about infant CPR. We recommend that you put your baby to sleep on his or her back to reduce the chance of Sudden Infant Death Syndrome (SIDS). To maintain a healthy environment in which your child can grow, it's best to keep your home smoke-free. By preparing ahead, your transition into parenthood will go smoothly for you and your baby.    Your midwife will want to see you for a checkup 2 to 6 weeks after delivery.      Making Plans for Feeding My Baby    By this point, you probably have read a lot about feeding your baby.  Breastfeed or formula? Each mother s decision is her own and [facility]  [facility] respects you and your choices. We ve gathered information on both breastfeeding and formula feeding to help with your decision. Talking with your physician or nurse-midwife can also help in your decision.  However you plan to feed your baby, [facility] encourage rooming in with your baby, skin-to-skin contact and feeding your baby based on his or her cues.    Skin-to-skin contact  Being close to mom helps your baby adjust to life outside of the womb.  It helps your baby regulate their body temperature, heart rate, and breathing.  Your baby will usually be placed skin-to-skin immediately following birth or as soon as possible, if medical intervention is needed.    Rooming-In  Having your baby stay with you in your room is called  rooming-in .  Keeping your baby in your room helps you to learn how to care for your baby by getting to know your baby s cues, body rhythms and sleep cycle.       Cue-based feeding  Cues (signals) are baby s way of telling you what he or she wants.  When you learn your infant s cues, you know how to care for and feed your baby.   Feeding cues are the licking and smacking of lips, bringing their fist to their mouth, and a reflex called  rooting - where baby turns and opens his or her mouth, searching for the breast or bottle.  Crying is a late feeding cue.  Babies can feed frequently, often at least 8 times in 24 hours.  Breastfeeding facts  Breast milk is the best source of nutrition for your baby and is available at birth. In the first couple of days, your milk volume is already starting to increase, though it may not be noticeable. Breastfeed frequently to increase your milk supply. Within three to five days, you will begin to notice larger milk volumes. An increase in breast size, heaviness and firmness are often described as the milk  coming in.  Frequent breastfeeding can help breasts from getting overly firm and painful. You will know the baby  is getting enough milk if your baby is having wet and dirty diapers and gaining weight.     If your goal is to exclusively breastfeed, it is important to not use any formula or artificial nipples (including bottles and pacifiers) while your baby is learning to breastfeed.  While it may seem like an  easy  option to give your baby a bottle, formula should only be given if there is a medical reason for your baby to have it.    Positioning and attachment   Get comfortable.  Use pillows as needed to support your arms and baby.  Hold baby close at the level of your breast, facing you in a tummy to tummy position.  Skin to skin helps with this.  Position the baby with his or her nose by the nipple.  There should be a straight line from baby s ear to shoulder to hips.  Tickle your baby s lips or wait for baby to open mouth wide, bring baby to breast by leading with the chin.  Aim the nipple at the roof of baby s mouth.  A rapid sucking pattern is followed by longer, drawing pattern with occasional swallows heard.  When baby is correctly latched, your nipple and much of the areola are pulled well into baby s mouth.      Returning to work or school  Focus on a good start to breastfeeding.  Many women continue to provide breastmilk for their baby when they return to work or school.  Making plans about where to pump and store milk can make the transition go well.  Talk with other mothers who have also returned to work or school for tips and support.  Your employer s Human Resource department may be a resource as well.     Returning to work or school: (continued)   babies can mean fewer  sick  days for you.  A quality breast pump will also save time and add comfort.  Check with your insurance prior to giving birth for breast pump coverage.  Many insurance companies include a pump within your benefits.  Wait until your baby is at least three weeks old to introduce a bottle for the first time.  Have someone besides you  give the bottle.  Breastfeed when you are with your baby. Reserve your bottles of breast milk for when you are away.   Your breasts will need to be  emptied  either by your baby or a pump.  Plan to pump at least twice in an eight hour day.  If you cannot pump at work, continue breastfeeding at home. Any amount of breast milk is worth giving to your baby.    Formula feeding facts  If you are planning to use formula to feed your baby, you will want to make some preparations ahead of time. Talk to your doctor or nurse-midwife about what type of formula to use. Some are iron-fortified, meaning they have extra iron in them. You will want to purchase formula and bottles before your baby is born to be sure you are ready after you return from the hospital. [facility] do not provide formula samples to take home.    Be sure to follow formula mixing directions closely. Regular milk in the dairy case at the grocery store should not be given to babies under 1 year old. Baby formula is sold in several forms including:  Ready-to-use. This is the most expensive, but no mixing is necessary.  Concentrated liquid. This is less expensive than ready-to-use and you mix with water.  Powder. This is the least expensive. You mix one level scoop of powdered formula with two ounces of water and stir well.    Most babies need 2.5 ounces of formula per pound of body weight each day. This means an 8-pound baby may drink about 20 ounces of formula a day; however, this is just an estimate. The most important thing is to pay attention to your baby s cues.  If your baby is always fussy, needs more iron or has certain food allergies, your physician may suggest you change your baby s formula to a different kind.     How do I warm my baby s bottles?  You may feed your baby a bottle without warming it first. It is OK for the breast milk or formula to be cool or room temperature. If your baby seems to prefer it warmed, you can put the  filled bottle in a container of warm water and let it stand for a few minutes. Check the temperature of the liquid on your skin before feeding it to your baby; to be sure it isn t too hot. Do not heat bottles in the microwave. Microwaves heat food and liquids unevenly, and this can cause hot spots that can burn your baby.    How do I clean and sterilize bottles?  Sterilize bottles and nipples before you use them for the first time. You can do this by putting them in boiling water for 5 minutes. After that first time, you can wash them in hot and soapy water. Rinse them carefully to be sure there is no soap left on them. You can also wash them in the .    Childbirth and Parenting Education:       Everyday Miracles:   https://www.everyday-miracles.org/    Free Video Series from [facility]: https://nursing.[facility]/academics/specialty-areas/nurse-midwifery/having-baby-prenatal-videos/having-baby-prenatal-and    Childbirth Education virtual (live) classes: www.[employer]/classes  The Birth Hour: https://[employer]/online-childbirth-class/  BirthED: https://www.birthedmn.com/  SHANNON parenting center: http://[facility] HospitalWeeleo/   (994) 855-BABY  Blooma: (education, yoga & wellness) www.[employer]  Enlightened Mama: www.enlightenedmama.[employer]   Childbirth collective: (Parent topic nights)  www.childbirthcollective.org/  Hypnobabies:  www.hypnobabiestwincities.com/  Hypnobirthing:  Http://hypnobirthing.[employer]/  Hypnobirthing virtual class: www.[employer]/hypnobirthing    Information about doulas:  Childbirth collective: http://www.childbirthcollective.org/  Doulas of [place] (SHEEBA):  www.sheeba.org  [facility]Up [facility]  project: http://Practice Fusiontiesdoulaproject.com/     Early Childhood and Family Education (ECFE):  ECFE offers parents hands-on learning experiences that will nourish a lifetime of teachable moments.  http://ecfe.info/ecfe-home/    APPS and Podcasts:   Ovia  Glow  "Nurture    Evidence Based Birth  The Birth Hour (for birth stories)   Birthful   Expectful   The Longest Shortest Time  PregnancyPodcast Dena Faridahazel    Book Recommendations:   Nella Brook's Birthing From Within--first few chapters include a new-age tone, you may prefer to skip it and keep going, because there is good stuff later.  This book recommendation covers emotional preparation, but does cover coping with pain, and use of both pharmacological and nonpharmacological methods.    Dr. Hand' The Pregnancy Book and The Birth Book--the pregnancy book goes month-by month    The Birth Partner by Leydi Welch    Womanly Art of Breastfeeding by La Leche League International   Bestfeeding by Emmy Weiner--great pictures    Mothering Your Nursing Toddler, by Heather Blanco.   Addresses dealing with so many of the challenging behaviors of a nursing toddler.  How Weaning Happens, by La Leche League.  Discusses weaning at all ages, from medically necessary weaning of an infant, all the way up to age 5 (or older), with why/why not, and strategies.  Very empowering book both for deciding to wean and deciding not to.    American College of Nurse-Midwives (ACNM) http://www.midwife.org/; look at the informational handouts at http://www.midwife.org/Share-With-Women     www.mymidwife.org    Mother to Baby (Medication and Herbal guidance in pregnancy): http://www.mothertobaby.org  Toll-Free Hotline: 981.314.1431  LactMed (Medication use while breastfeeding): http://toxnet.nlm.nih.gov/newtoxnet/lactmed.htm    Women's Health.gov:  http://www.womenshealth.gov/a-z-topics/index.html    American pregnancy association - http://americanpregnancy.org    Centering Pregnancy (group prenatal care option): http://centeringhealthcare.org    March of Dimes www.Grooveshark     FDA - Nutrition  www.mypyramid.gov  Under \"For Consumers,\" click on \"pregnant and breastfeeding women.\"      Centers for Disease Control and Prevention (CDC) - " Vaccines : http://www.cdc.gov/vaccines/       When researching information on the web, question the validity of websites.  The Good Eggs .gov, .edu and.org tend to be more reliable information.  If there are a lot of advertisements, be cautious of the information provided. Stay away from blogs and chat rooms please!     Community Breastfeeding Support    Early Childhood Family [place] [address] provides a free, drop-in class/breastfeeding support group, facilitated by a lactation consultant and .  During the group you can connect with other parents, weigh your baby, ask questions about feeding and baby development, and more.  You do not need to register.  Fall in-person meetings will begin on , are for parents of babies from birth to 9 months, and will meet on Monday evenings from 6 - 7:15 pm in [facility], which is at [address] [place] [address].  [place] [address] also offers virtual group meetings with a lactation consultant/.  These take place on , from 11:30 am - 12:30 pm for parents of newborns to 3-month-olds, and from 4:15 - 5:15 for parents of 3 - 9 - month olds.  To get the meeting link contact Carmencita العراقي at [phone].    [facility] offers a free, drop-in breastfeeding support group facilitated by ANDRIY Oro.   at [facility] [address], [place].  A scale is available to check baby weights, if desired.  [place] also has a variety of new parent classes:  (cost for registration)  https://[facility] Fres[facility] Medical CenterPrestiamoci.ArcherMind Technology/classes/    [facility] Lactation [facility] facilitated by ANDRIY Sanchez:  Free, drop-in support group for breastfeeding, with baby scale available if needed.  Meets at [facility], second Tuesday of each month, 10 am - 12 pm.  Text [phone] for info.    [facility] Support Group,  at 10:30 am   Run by ANDRIY Novoa of The [facility] Lactation  "Consultants   Go to The [facility] Lactation Consultants Facebook page, click on \"events\" for link:   Https://[email or web address]/    [facility] breastfeeding support community:  free, drop-in breastfeeding support facilitated by Certified Lactation Counselors, open Mondays and Wednesdays from 1 pm - 5 pm.  In [place]:  [facility], [address] [facility]:   guidingarisiahta.org    [facility] Milk Hour, Thursdays at 2:30 pm    Run by Joseph Novak IBCLC  Go to [facility] FB page and send message to get link   Https://www.Receept.Silent Herdsman/Seres HealthundHurray!/    Laila Elaine:  [email or web address]://[email or web address]/    [facility] offers a Lactation Lounge every Friday 12pm - 1pm, run by Laila Robertson Leader.  Sign up via link at [employer]/cbe-lactation   https://www.[employer]/cbe-lactation    [facility] is offering virtual support groups every Monday, 10:30 am - 12 pm, run by RN IBCLC: Https://www.[email or web address]/    Culturally-Specific Breastfeeding Support:     For Hmong Families:   The Hmong Breastfeeding Coalition is a wonderful support for [place] Hmong women who are breastfeeding.  They are best found on Facebook.    for Black families:    Chocolate Milk Club:  http://www.The Dodo.Silent Herdsman/chocolate-milk-club/  Email: Leonard@[employer]    R.O.S.E. = Reaching our Sisters Everywhere  Http://www.breastfeedingrose.org/    Club Mom breastfeeding support for Black mothers:  Contact Jena Owusu  Phone: [phone]   Email:  Khalif@[facility]     Kendra Mas  Phone: [phone]   Email:  Cecilia@[email or web address]    Club Dad parent support for Black fathers:   Contact Mp Olguin   Phone: [phone]   Email:  [email or web address] CityBrookmn.    For Native/Indigenous " "Families:    https://www.U[employer]iA Games.com/groups/nitamising.gimashkikinaan     Additional Resources:  La Leche The Leg[employer] Steal Show is an international, nonprofit, nonsectarian organization offering information, education, and support to mothers who want to breast-feed their babies. Local groups offer phone help and monthly meetings. Visit [employer].org or Datasnap.io.org and us the  Find local support  drop down menu or click on the  Resources  tab.    [place] Breastfeeding Resources: 6-256-841-BABY (2229) toll free    National Breastfeeding Help Line trained breastfeeding peer counselors can help answer common breast-feeding questions by phone. Monday-Friday: English/Macedonian  [phone]- [phone] toll free, [phone] (TTY)    Virtual Breastfeeding Support:    During this time of isolation, breastfeeding families need even more community!  Here are some area organizations offering virtual support groups for breastfeeding:    [facility] Support Group, Tuesdays at 10:30 am   Run by ANDRIY Novoa of [facility] Lactation Consultants   Go to [facility] Lact[facility] Facebook page and click on \"events\" for link   https://www.[email or web address] Games.com/[email or web address]/  [facility] Milk Hour, Thursdays at 2:30 pm    Run by ANDRIY Nam   Go to [facility] FB page and send message to get link   https://www.U[employer] Games.com/healthfoundations/  [facility]/[place] holding virtual meetings the first Tuesday of each month, 8-9 pm, and the   Third Saturday, 10 - 11 am.  Go to [facility] FB page; message to get link https://www.[email or web address] Games.com/LLLofGRadha/?hc_location=[facility]  [place] offers a Lactation Lounge every Friday 12pm - 1pm, run by [facility] Leader   Sign up via link at https://www.[employer]/cbe-lactation  [facility] is offering virtual support groups every " Monday, 10:30 am - 12 pm, run by nurse ANDRIY   Https://www.facebook.[email or web address]/    Prenatal Breastfeeding Classes:      [employer] is offering virtual breastfeeding and  care classes:  https://www.iPling.Gigathlete/education-workshops  [facility] childbirth and breastfeeding education offering virtual prenatal breastfeeding classes  https://www.birthedmn.com/workshops

## 2023-05-10 NOTE — PROGRESS NOTES
"Laura presents to clinic with her mom today for a routine PNV at 36w3d.  Feeling overall fatigued.  Discussed:  1.) Prenatal records still not in chart.  CA will call [facility] again.  2.) Insomnia.  Reports nothing helping \"we are just going through it\"  3.) Tobacco use, 5-6 cigarettes per day.  Declines nicotine patches, uses nicotine gum while at work.  Has tried patches in past and disliked effects \"makes me wiry\".    Reviewed maternal growth chart.  More than recommended weight gain.  Aware.  Fetus TY by Leopold's Maneuvers and confirmed with bedside ultrasound.   Malposition Risks include: Nulliparity and BMI > 30  Reviewed maternal postures and exercises to encourage optimal fetal positioning until labor begins.  Desires SVE: 2/50/0/soft/post.  Russ = 6    GBS and Hgb today.    RTC 1 week, sooner as needed.    Has CNM numbers and aware to call with SROM, labor, or any questions or concerns.    SIM Joyec CNM   5/9/2023  9:25 AM     "

## 2023-05-11 LAB — GP B STREP DNA SPEC QL NAA+PROBE: NEGATIVE

## 2023-05-11 NOTE — PROGRESS NOTES
Prenatal records from [facility] received and reviewed.  IOB labs and exam WNL.  US's WNL with exception of multiple attempts to view fetal profile with repeated limited views due to fetal position.  Second trimester labs WNL- 1 hour GCT result 118, second trimester Hgb 10.3 g/dL, negative RPR.  No evidence of Tdap administration, please address next prenatal visit.    SIM Joyce CNM   5/11/2023  12:40 PM

## 2023-05-17 ENCOUNTER — PRENATAL OFFICE VISIT (OUTPATIENT)
Dept: MIDWIFE SERVICES | Facility: CLINIC | Age: 28
End: 2023-05-17
Payer: COMMERCIAL

## 2023-05-17 VITALS — BODY MASS INDEX: 39.49 KG/M2 | DIASTOLIC BLOOD PRESSURE: 82 MMHG | WEIGHT: 209 LBS | SYSTOLIC BLOOD PRESSURE: 126 MMHG

## 2023-05-17 DIAGNOSIS — D64.9 ANEMIA, UNSPECIFIED TYPE: ICD-10-CM

## 2023-05-17 DIAGNOSIS — Z34.03 ENCOUNTER FOR SUPERVISION OF NORMAL FIRST PREGNANCY IN THIRD TRIMESTER: Primary | ICD-10-CM

## 2023-05-17 LAB
FERRITIN SERPL-MCNC: 13 NG/ML (ref 6–175)
IRON BINDING CAPACITY (ROCHE): 500 UG/DL (ref 240–430)
IRON SATN MFR SERPL: 15 % (ref 15–46)
IRON SERPL-MCNC: 73 UG/DL (ref 37–145)

## 2023-05-17 PROCEDURE — 99207 PR PRENATAL VISIT: CPT | Performed by: ADVANCED PRACTICE MIDWIFE

## 2023-05-17 PROCEDURE — 59425 ANTEPARTUM CARE ONLY: CPT | Performed by: ADVANCED PRACTICE MIDWIFE

## 2023-05-17 PROCEDURE — 83550 IRON BINDING TEST: CPT | Performed by: ADVANCED PRACTICE MIDWIFE

## 2023-05-17 PROCEDURE — 83540 ASSAY OF IRON: CPT | Performed by: ADVANCED PRACTICE MIDWIFE

## 2023-05-17 PROCEDURE — 36415 COLL VENOUS BLD VENIPUNCTURE: CPT | Performed by: ADVANCED PRACTICE MIDWIFE

## 2023-05-17 PROCEDURE — 82728 ASSAY OF FERRITIN: CPT | Performed by: ADVANCED PRACTICE MIDWIFE

## 2023-05-17 NOTE — PATIENT INSTRUCTIONS
"\"We hope you had a positive experience and that you can definitely recommend [facility] to your family and friends. You ll be receiving a survey soon and we look forward to hearing your feedback\".    [facility]  - Contact information:  Appointment line and to get a hold of CNM in clinic Monday-Friday 8 am - 5 pm:  [phone].  There are some clinics with early start times (1st appointment 7:40 am) and others with evening hours (last appointment 6:20 pm).  Most are typically open from 8 am to 5 pm.    CNM on call answering service: [phone].  Specify your hospital of choice and leave a brief message for CNM;  will then page CNM who is on call at your specified hospital and you should receive a call back with 15 minutes.  Be sure that your ringer is audible and that you can accept blocked calls so that we can get back in touch with you! This number should be reserved for urgent needs if during the day, before 8 am, after 5 pm, weekends, holidays.    Contact the on-call CNM with warning signs, such as:    vaginal bleeding     Vaginal discharge and itching or pain and burning during urination    Leg/calf pain or swelling on one side    severe abdominal pain    nausea and vomiting more than 4-5 times a day, or if you are unable to keep anything down    fever more than 100.4 degrees F.     Localocracyhart  After each of your visits you are welcome to check ViaSat for your visit summary including education and links to information relevant to your pregnancy and/or well woman care.   Find the \"Visits\" tab at the top of the page, you will see a list of recent visits and for each visit a for link for \"View After Visit Summary.\" View of your After Visit Summary will allow you to read our recommendations from your visit, review any education provided, and link to websites with useful information.   If you have any questions or difficulty navigating [facility], please feel " "free to contact us and we will do our best to direct you.  Meet the Midwives from [facility]  You are invited to an informational meet and greet with [facility]'s [facility] Certified Nurse-Midwives. Our free \"Meet the Midwives\" event is a great opportunity to learn about our midwives' philosophy and experience, the hospitals where we can assist with your birth, and answer questions you may have. Partners, friends, and family are welcome to attend. Currently, this is a virtual event.  Date  Last Thursday of every month at 7 pm.    Link to next (live) meeting  [email or web address]  To Join by Telephone (audio only) Call:   [phone] Phone Conference ID: [phone] #    Touring the Maternity Care Center  At this time we are offering a virtual tour of the Maternity Care Centers at both [facility] and [facility]:   [facility]:   [email or web address]/the-[email or web address]--[facility] Maternity Care:   [email or web address]    Scroll to the bottom of this hyperlink if the above link does not work      Postpartum Depression  The first weeks of caring for a  baby are more than a full-time job. Although it is often a happy time, your feelings and moods may not be what you expected. Many women experience  baby blues.  Here are some tips to help you understand when feelings of sadness are normal, and when you should call your health care provider.    What are the baby blues?  As many as 3 of every 4 women will have short periods of mood swings, crying, or feeling cranky or restless during the first weeks after birth. These feelings can be worse when you are tired or anxious. Women who have the baby blues often say they feel like crying but don t know why. Baby blues usually happen in the first or second week " postpartum (after you give birth) and last less than a week. If you are not sleeping, becoming more upset, don t feel like you can take care of your baby, or your sadness lasts 2 weeks or more, call your health care provider.    What is postpartum depression?  About one in every 5 women will develop depression during the first few months postpartum that may be mild, moderate, or severe. Women who have postpartum depression may have some of these symptoms:    Feeling guilty     Not able to enjoy your baby and feeling like you are not bonding with your baby      Not able to sleep, even when the baby is sleeping    Sleeping too much and feeling too tired to get out of bed    Feeling overwhelmed and not able to do what you need to during the day    Not able to concentrate    Don t feel like eating    Feeling like you are not normal or not yourself anymore    Not able to make decisions    Feeling like a failure as a mother    Feeling lonely or all alone    Thinking your baby might be better off without you  If you have any of these symptoms, call your health care provider!    Which symptoms of postpartum depression are dangerous?  Sometimes a woman with postpartum depression will have thoughts of harming herself or her baby. If you find yourself thinking about hurting yourself or your baby, call your health care provider immediately.    MOTHERHOOD: THE EARLY DAYS  You prepare for the birth of your baby for many months during pregnancy, and then the first months at home after your baby is born can be a quiet, gentle time of getting to know this new person who has come to live in your home. But for most women it is not all quiet or sweet. And for some women it is a very hard time.  What Can I Expect in the First Few Months After the Baby Comes?  New mothers and their families face many challenges in the first few months:    Your body and your hormones have to get back to normal.    You and the baby will be learning to  breastfeed.    Babies only sleep a few hours at a time. The entire family will have a hard time getting enough sleep.    You and your family need to learn how to parent this new family member.    If you have a partner, you have to figure out how to stay together as a couple and maybe even start to have sex again.    You may have to figure out how to keep from getting pregnant again right away.    You may need to return to work and find day care.    How Long Will it Take for My Body to Get Back to Normal?  Some changes will occur quickly. Others will not occur as quickly.    Your uterus, cervix, and vagina will all shrink to their nonpregnant size in about 2 weeks. Your vagina may be tender and dry for a few months--especially if you are breastfeeding.    If you had stitches or hemorrhoids, your   bottom   will be sore for 2 weeks or more.    For some women who have problems urinating, it can take several months for you to be able to hold your urine when you cough or sneeze or suddenly  something heavy.    Your breast milk will   come in   2 to 3 days after the birth of your baby. It will take 6 to 8 weeks for you and the baby to get the hang of breastfeeding and find a pattern. During these first weeks, you can have engorged breasts at times and often leak milk.    Your stomach and intestines all have to fall back into place. You may have a lot of gas for a few weeks.    You may be constipated--especially if you are breastfeeding.    Your stretched stomach muscles can recover in a few weeks, but for some women it takes longer--6 months or a year--to recover.    If you had a  delivery, you may have pain or numbness around the incision for 6 months or more.    Losing the weight you gained during pregnancy will probably take 6 months to a year. Have patience! It took 40 weeks to get here. Give yourself 40 weeks to get back.    What Can I Expect When My Hormones Change?  About 75% of all women will get  the   blues.   This usually starts about 3 days after the birth of your baby. You may cry easily and feel very, very tired. A few women become very depressed. If you had a  delivery or your new baby was sick, you are at a higher risk for depression.  Call your health care provider right away if you cannot care for yourself or your baby, if you feel very nervous or worried, if you cannot stop crying, or if you are having thoughts of hurting yourself or your baby.    Taking Care of Yourself  While you are still pregnant:    Talk with your partner and your family about the time ahead. Arrange for someone to help you during the first weeks at home if you can.    Talk with your health care provider about birth control options and make a plan before the baby comes.    If you are worried about how to parent a , take parenting classes. You will learn a lot about how babies act and you will make some friends who are going through the same thing at the same time. Most [facility] have these classes.    Arrange for someone to help with baby care if you can.  After the baby comes:    Ask for help. Let other people do the cooking and cleaning and run the house. Focus on yourself and your baby.    Sleep whenever you can. Try not to be tempted to   get some things done   when the baby sleeps. This is your time to sleep, too.    Drink lots of water. You will need at least 6 big glasses of water everyday to avoid constipation and make enough breast milk. Every time you sit down to breastfeed, have a big glass of water with you to drink while you are nursing.    Eat lots of vegetables and fruit. You will need lots of vitamins and fiber to help your body get back to normal. This will also help you avoid constipation.    Go outside and walk. Babies can go outside even if it is very cold. Fresh air and sunshine will do you both good.    Take sitz baths. Put about 6 inches of warm water in your bathtub and sit in there for  15 minutes 2 to 3 times a day. This will help your   bottom   heal more quickly. It will also give you 15 minutes of private time!    Talk to other mothers. Join a new parents group. Call Gilberto and go to [facility] meetings if you are breastfeeding.     With your partner:    Keep talking. Share the experience.    Spend time alone. Even a 30-minute walk can be a date.    Start a birth control method. You can get pregnant before you even have a period. It is very important to use birth control if you do not want to get pregnant again right away.    When you have sex, use a lubricant. A lot of lubricant! Take it slow.  The first few months after a baby comes can be a lot like floating in a jar of honey--very sweet and caba, but very sticky, too. Take time to enjoy the good parts. Remind yourself that this time will pass. Bon voyage!    FOR MORE INFORMATION  For questions about depression during and after pregnancy:  http://www.womenshealth.gov/publications/our-publications/fact-sheet/depression-pregnancy.html   After birth: The first 6 weeks:  http://www.Folloyu/Post-Birth-and-Recovery   Breastfeeding resources:  http://www.SweetgreendiBeyondTrustlves.org/health-info/getting-breastfeeding-off-to-a-good-start/    Preparing for your baby:       Car Seat Clinics:  https://dps.[place].gov/divisions/ots/child-passenger-safety/Pages/car-seat-checks.aspx    [place] Safety Sun'aq: http://www.[place]afetycouncil.org/family/carseatindex.cfm    Child Passenger Safety: Buckle Up Right    When child safety seats and safety belts are used correctly, they can reduce the risk of death by up to 70%. But finding the right combination can be confusing.  How do you know if you should be using an infant-only seat or a convertible seat?  What are booster seats and why do kids need them until they're eight years old or are four feet nine inches tall?  How do you know when a child is ready for your car's safety belt/shoulder  strap?  The American Academy of Pediatrics (AAP) recommends that all infants and toddlers should ride in a Rear-Facing Car Safety Seat until they are two (2) years of age or until they reach the highest weight or height allowed by their car safety seat's .    A child who is both under age 8 and shorter than 4 feet 9 inches is required to be fastened in a child safety seat that meets federal safety standards. Under this law, a child cannot use a seat belt alone until they are age 8, or 4 feet 9 inches tall. It is recommended to keep a child in a booster based on their height rather than their age. Check the instruction book or label of the child safety seat to be sure it is the right seat for your child's weight and height.    www.CarSeatsMadeSimple.org    Car Seat Recycling, -    Get free expert help in a [place] community near you:  [place] Child Passenger Safety Checkup Clinic Calendar    How to Find the Right Car Seat (NHTSA)  Safe Kids [place]    Print Materials  Basic Car Seat Safety checklist  Don't Skip a Step brochure (English); (Albanian); (Bruneian)  Good Going! Adventures in Safety magazine  Fact Sheets  Booster Seat Safety  Outdated and Used Child Safety Seats  A Parents' Checklist: Traffic Safety  Driving Your Child to School  Occupant Protection (Safety Belts and Child Safety Seats): Frequently Asked Questions; Misconceptions and Myths; [place] Laws  Air Bags: How Do Air Bags Work; Frequently Asked Questions      Immunizations:  http://www.cdc.gov/vaccines/schedules/easy-to-read/child.html    [place] Screening Program  Http://www.health.[facility].mn.us/newbornscreening/  [place] newborns are tested soon after birth for more than 50 hidden, rare disorders, including hearing loss and critical congenital heart disease (CCHD). This site provides resources and information for families and providers.    Ask your health care provider about vaccinations you may need following delivery.  By now, you should have received a Tdap immunization to protect against pertussis or whooping cough. Fathers and family members who will be in close contact with the baby should also receive a Tdap shot at least two weeks before the expected birth of the baby if they have not had a Td (tetanus) shot for at least two years.    Your midwife may offer to check your cervix for changes. If you are past your due date, discuss the next steps leading to delivery with your midwife. If you don't start labor on your own by 41 or 42 weeks, your midwife may recommend giving you medicines to ripen your cervix and start labor.  Induction of labor: http://onlinelibrary.mcclain.com/store/10.1016/j.jmwh.2008.04.018/asset/j.jmwh.2008.04.018.pdf?v=1&t=lfnd7xhs&p=[email or web address]    Tell your midwife or physician how you plan to feed your baby (breast or bottle), who you have chosen to do pediatric care for your baby, and if you have a boy, whether you have chosen to have him circumcised. You will need a car seat correctly installed in your vehicle to bring your baby home. As you start to set up the nursery at home for your baby, make sure the crib is safe. The mattress needs to fit snugly against the edges of the crib. If you can fit a soda can between the bars, they are too far apart and can allow the baby's head to caught between them.    Learn about infant care and feeding, including information about infant CPR. We recommend that you put your baby to sleep on his or her back to reduce the chance of Sudden Infant Death Syndrome (SIDS). To maintain a healthy environment in which your child can grow, it's best to keep your home smoke-free. By preparing ahead, your transition into parenthood will go smoothly for you and your baby.    Your midwife will want to see you for a checkup 2 to 6 weeks after delivery.      Making Plans for Feeding My Baby    By this point, you probably have read a lot about feeding your  baby.  Breastfeed or formula? Each mother s decision is her own and [facility] respects you and your choices. We ve gathered information on both breastfeeding and formula feeding to help with your decision. Talking with your physician or nurse-midwife can also help in your decision.  However you plan to feed your baby, [facility] encourage rooming in with your baby, skin-to-skin contact and feeding your baby based on his or her cues.    Skin-to-skin contact  Being close to mom helps your baby adjust to life outside of the womb.  It helps your baby regulate their body temperature, heart rate, and breathing.  Your baby will usually be placed skin-to-skin immediately following birth or as soon as possible, if medical intervention is needed.    Rooming-In  Having your baby stay with you in your room is called  rooming-in .  Keeping your baby in your room helps you to learn how to care for your baby by getting to know your baby s cues, body rhythms and sleep cycle.       Cue-based feeding  Cues (signals) are baby s way of telling you what he or she wants.  When you learn your infant s cues, you know how to care for and feed your baby.   Feeding cues are the licking and smacking of lips, bringing their fist to their mouth, and a reflex called  rooting - where baby turns and opens his or her mouth, searching for the breast or bottle.  Crying is a late feeding cue.  Babies can feed frequently, often at least 8 times in 24 hours.  Breastfeeding facts  Breast milk is the best source of nutrition for your baby and is available at birth. In the first couple of days, your milk volume is already starting to increase, though it may not be noticeable. Breastfeed frequently to increase your milk supply. Within three to five days, you will begin to notice larger milk volumes. An increase in breast size, heaviness and firmness are often described as the milk  coming in.   Frequent breastfeeding can help breasts from getting overly firm and painful. You will know the baby is getting enough milk if your baby is having wet and dirty diapers and gaining weight.     If your goal is to exclusively breastfeed, it is important to not use any formula or artificial nipples (including bottles and pacifiers) while your baby is learning to breastfeed.  While it may seem like an  easy  option to give your baby a bottle, formula should only be given if there is a medical reason for your baby to have it.    Positioning and attachment   Get comfortable.  Use pillows as needed to support your arms and baby.  Hold baby close at the level of your breast, facing you in a tummy to tummy position.  Skin to skin helps with this.  Position the baby with his or her nose by the nipple.  There should be a straight line from baby s ear to shoulder to hips.  Tickle your baby s lips or wait for baby to open mouth wide, bring baby to breast by leading with the chin.  Aim the nipple at the roof of baby s mouth.  A rapid sucking pattern is followed by longer, drawing pattern with occasional swallows heard.  When baby is correctly latched, your nipple and much of the areola are pulled well into baby s mouth.      Returning to work or school  Focus on a good start to breastfeeding.  Many women continue to provide breastmilk for their baby when they return to work or school.  Making plans about where to pump and store milk can make the transition go well.  Talk with other mothers who have also returned to work or school for tips and support.  Your employer s Human Resource department may be a resource as well.     Returning to work or school: (continued)     babies can mean fewer  sick  days for you.    A quality breast pump will also save time and add comfort.  Check with your insurance prior to giving birth for breast pump coverage.  Many insurance companies include a pump within your benefits.    Wait until  your baby is at least three weeks old to introduce a bottle for the first time.  Have someone besides you give the bottle.    Breastfeed when you are with your baby. Reserve your bottles of breast milk for when you are away.     Your breasts will need to be  emptied  either by your baby or a pump.  Plan to pump at least twice in an eight hour day.    If you cannot pump at work, continue breastfeeding at home. Any amount of breast milk is worth giving to your baby.    Formula feeding facts  If you are planning to use formula to feed your baby, you will want to make some preparations ahead of time. Talk to your doctor or nurse-midwife about what type of formula to use. Some are iron-fortified, meaning they have extra iron in them. You will want to purchase formula and bottles before your baby is born to be sure you are ready after you return from the hospital. The [facility] do not provide formula samples to take home.    Be sure to follow formula mixing directions closely. Regular milk in the dairy case at the grocery store should not be given to babies under 1 year old. Baby formula is sold in several forms including:    Ready-to-use. This is the most expensive, but no mixing is necessary.    Concentrated liquid. This is less expensive than ready-to-use and you mix with water.    Powder. This is the least expensive. You mix one level scoop of powdered formula with two ounces of water and stir well.    Most babies need 2.5 ounces of formula per pound of body weight each day. This means an 8-pound baby may drink about 20 ounces of formula a day; however, this is just an estimate. The most important thing is to pay attention to your baby s cues.  If your baby is always fussy, needs more iron or has certain food allergies, your physician may suggest you change your baby s formula to a different kind.     How do I warm my baby s bottles?  You may feed your baby a bottle without warming it first. It is OK for  the breast milk or formula to be cool or room temperature. If your baby seems to prefer it warmed, you can put the filled bottle in a container of warm water and let it stand for a few minutes. Check the temperature of the liquid on your skin before feeding it to your baby; to be sure it isn t too hot. Do not heat bottles in the microwave. Microwaves heat food and liquids unevenly, and this can cause hot spots that can burn your baby.    How do I clean and sterilize bottles?  Sterilize bottles and nipples before you use them for the first time. You can do this by putting them in boiling water for 5 minutes. After that first time, you can wash them in hot and soapy water. Rinse them carefully to be sure there is no soap left on them. You can also wash them in the .    Childbirth and Parenting Education:       Everyday Miracles:   https://www.everyday-miracles.org/    Free Video Series from [facility]: https://nursing.[facility]/academics/specialty-areas/nurse-midwifery/having-baby-prenatal-videos/having-baby-prenatal-and    Childbirth Education virtual (live) classes: www.Cloverleaf Communications/classes  The Birth Hour: https://EasyPost/online-childbirth-class/  BirthED: https://www.birthedmn.com/  [facility] parenting center: http://[facility] HospitalVitrue.iCar Asia/   (628) 111-BABY  [place]: (education, yoga & wellness) www.AlignMed  Enlightened Mama: www.enlightenedmama.com   Childbirth collective: (Parent topic nights)  www.childbirthcollective.org/  Hypnobabies:  www.hypnobabiestwincities.com/  Hypnobirthing:  Http://hypnYbrant DigitalrtHALO Maritime Defense Systems.iCar Asia/  Hypnobirthing virtual class: www.Videostir/hypnobirthing    Information about doulas:  Childbirth collective: http://www.childbirthcollective.org/  Doulas of [place] (SHEEBA):  www.sheeba.org  [facility]  project: http://Epic!doNanjing Ruiyue Information Technologyject.iCar Asia/     Early Childhood and Family Education (ECFE):  ECFE offers parents hands-on learning experiences  that will nourish a lifetime of teachable moments.  http://ecfe.info/ecfe-home/    APPS and Podcasts:   Mica Mccall Nurvira    Evidence Based Birth  The Birth Hour (for birth stories)   Birthful   Expectful   The Longest Shortest Time  PregnancyPodcast Dena Merahzel    Book Recommendations:   Nella Fullerton's Birthing From Within--first few chapters include a new-age tone, you may prefer to skip it and keep going, because there is good stuff later.  This book recommendation covers emotional preparation, but does cover coping with pain, and use of both pharmacological and nonpharmacological methods.      Guide to Childbirth by Shana Paiz  Childbirth Without Fear by Ashley Alonso Read    Dr. Hand' The Pregnancy Book and The Birth Book--the pregnancy book goes month-by month    The Birth Partner by Leydi Welch    Womanly Art of Breastfeeding by La Leche League International   Bestfeeding by Emmy Weiner--great pictures    Mothering Your Nursing Toddler, by Heather Blanco.   Addresses dealing with so many of the challenging behaviors of a nursing toddler.  How Weaning Happens, by La Leche League.  Discusses weaning at all ages, from medically necessary weaning of an infant, all the way up to age 5 (or older), with why/why not, and strategies.  Very empowering book both for deciding to wean and deciding not to.    American College of Nurse-Midwives (ACNM) http://www.midwife.org/; look at the informational handouts at http://www.midwife.org/Share-With-Women     www.mymidwife.org    Mother to Baby (Medication and Herbal guidance in pregnancy): http://www.mothertobaby.org  Toll-Free Hotline: 927.147.4695  LactMed (Medication use while breastfeeding): http://toxnet.nlm.nih.gov/newtoxnet/lactmed.htm    Women's Health.gov:  http://www.womenshealth.gov/a-z-topics/index.html    American pregnancy association - http://americanpregnancy.org    Centering Pregnancy (group prenatal care option):  "http://centeringhealthcare.org     Dimes www.Ask Ziggy.Somewhere     FDA - Nutrition  www.mypyramid.gov  Under \"For Consumers,\" click on \"pregnant and breastfeeding women.\"      Centers for Disease Control and Prevention (CDC) - Vaccines : http://www.cdc.gov/vaccines/       When researching information on the web, question the validity of websites.  The BuzzTable .gov, .Relatient and.org tend to be more reliable information.  If there are a lot of advertisements, be cautious of the information provided. Stay away from blogs and chat rooms please!     [facility] Breastfeeding Support      Early Childhood Family [place] [address] provides a free, drop-in class/breastfeeding support group, facilitated by a lactation consultant and .  During the group you can connect with other parents, weigh your baby, ask questions about feeding and baby development, and more.  You do not need to register.  Fall in-person meetings will begin on , are for parents of babies from birth to 9 months, and will meet on Monday evenings from 6 - 7:15 pm in [facility], which is at [address] [place] [address].    [place] [address] also offers virtual group meetings with a lactation consultant/.  These take place on , from 11:30 am - 12:30 pm for parents of newborns to 3-month-olds, and from 4:15 - 5:15 for parents of 3 - 9 - month olds.  To get the meeting link contact Carmencita العراقي at [phone].      [facility] offers a free, drop-in breastfeeding support group facilitated by ANDRIY Oro.   at [facility] [address], [place].  A scale is available to check baby weights, if desired.  [employer] also has a variety of new parent classes:  (cost for registration)  https://PlaceVine.Somewhere/classes/      [facility] Lactation [facility] facilitated by ANDRIY Sanchez:  Free, drop-in support group for breastfeeding, with baby scale " "available if needed.  Meets at [facility], second Tuesday of each month, 10 am - 12 pm.  Text [phone] for info.      [facility] Support Group, Tuesdays at 10:30 am   Run by Sally Rosales, IBTHAO of [employer] Baby Wh[employer]perer Lactation [employer]   Go to [employer] [facility] [employer] Facebook page, click on \"events\" for link:   Https://www.Project Manager.com/events/[email or web address]/      [facility] breastfeeding support community:  free, drop-in breastfeeding support facilitated by Certified Lactation Counselors, open Mondays and Wednesdays from 1 pm - 5 pm.  In [place]:  Guiding [facility]:   [email or web address]      [facility] Milk Hour, Thursdays at 2:30 pm    Run by Joseph Novak, TIFFANYCLC  Go to [facility] FB page and send message to get link   Https://www.Project Manager.[employer]/Keaton Rowfoundations/      Laila Elaine:  http://www.Creative Logic MedialoTiVondas.WhiteSmoke/        [employer]â„¢ offers a Lactation Lounge every Friday 12pm - 1pm, run by Bing Vargas, Laila Elaine Leader.  Sign up via link at creads/cbe-lactation   https://www.creads/cbe-lactation      [facility] is offering virtual support groups every Monday, 10:30 am - 12 pm, run by RN IBCLC: Https://www.Project Manager.com/events/[email or web address]/    Culturally-Specific Breastfeeding Support:     For Hmong Families:   The Hmong Breastfeeding Coalition is a wonderful support for [place] Hmong women who are breastfeeding.  They are best found on Facebook.    for Black families:    Chocolate Milk Club:  http://www.connex.io.com/chocolate-milk-club/  Email: Leonard@Cashkaro    R.O.S.E. = Reaching our Sisters Everywhere  Http://www.breastfeedingrose.org/    Club Mom breastfeeding support for Black mothers:  Contact Jena Owusu  Phone: [phone]   Email:  [email or web address].     Kendra Mas  Phone: [phone]   Email:  " "[email or web address].    Club Dad parent support for Black fathers:   Contact Mp Olguin   Phone: [phone]   Email:  [email or web address].    For Native/Indigenous Families:    https://www.TOWONA [employer].com/groups/johnathan.gimashkikinaan     Additional Resources:  La Leche [place] BCS is an international, nonprofit, nonsectarian organization offering information, education, and support to mothers who want to breast-feed their babies. Local groups offer phone help and monthly meetings. Visit Bizily.XMLAW or Cardica.XMLAW and us the  Find local support  drop down menu or click on the  Resources  tab.    [place] Breastfeeding Resources: 5-080-019-BABY (2229) toll free    National Breastfeeding Help Line trained breastfeeding peer counselors can help answer common breast-feeding questions by phone. Monday-Friday: English/Equatorial Guinean  [phone]- [phone] toll free, [phone] (TTY)    Virtual Breastfeeding Support:    During this time of isolation, breastfeeding families need even more community!  Here are some area organizations offering virtual support groups for breastfeeding:      [facility] Support Group, Tuesdays at 10:30 am   Run by ANDRIY Novoa of [employer] [facility] [facility]   Go to [employer] [facility] Facebook page and click on \"events\" for link   https://www.[employer]ONA [employer].com/events/[email or web address]/    [facility] Milk Hour, Thursdays at 2:30 pm    Run by ANDRIY Nam   Go to [facility] FB page and send message to get link   https://www.[employer].com/healthfoundations/    [facility] holding virtual meetings the first Tuesday of each month, 8-9 pm, and the   Third Saturday, 10 - 11 am.  Go to [facility] [place] FB page; message to get link [email or web address]://www.[employer].com/DOUGLASLofGRadha/?hc_location=[facility]    [facility] offers a Lactation Lounge " every Friday 12pm - 1pm, run by Laila Robertson Leader   Sign up via link at https://www.Zeugma Systems/cbe-lactation    [facility] is offering virtual support groups every Monday, 10:30 am - 12 pm, run by nurse ANDRIY   Https://www.[place].com/events/[email or web address]/    Prenatal Breastfeeding Classes:        [employer] is offering virtual breastfeeding and  care classes:  https://www.Zeugma Systems/education-workshops    BirthEd childbirth and breastfeeding education offering virtual prenatal breastfeeding classes  https://www.birthedmn.com/workshops     Labor and Childbirth: Support Person's Notes  You may be excited and anxious about the impending labor and childbirth. You may also wonder how you can help. Learning about the birth process can help you know what to expect. And following the suggestions below can help ease you and the mother through this exciting time.   During early labor    Be sure to time the contractions.    Keep the setting soothing. Dim lights and prevent loud noises. Try playing relaxing music.    Suggest that the mother soak in a warm tub to ease the pain of contractions.    Try to distract the mother from the contractions with a short walk or massage.    Encourage the mother to rest if she's tired.    As contractions become stronger, help her use labor breathing techniques.    During active labor    Have the mother walk or change position at least once an hour. This improves circulation and helps the baby descend.    Keep reminding the mother to breathe and relax through each contraction.    Reassure her. Try to keep her from getting anxious or overstressed.    Take care of yourself. Take a short break to eat or go to the bathroom when you need to.    Rest when the mother does. You'll both benefit.    During a vaginal birth    Help the mother into a pushing position. Support her body as she pushes. A semi-sitting or semi-squatting position allows gravity to assist  the birth.    Remind her to rest between contractions. Encourage her by telling her when the baby's head appears.    Keep in mind that you may be masked and gowned for the birth, depending on hospital policy.    During a  birth    You will most likely be able to stay with the mother during the . If you remain with her, you'll wear a mask and surgical gown.    Remember that  birth is surgery. The mother's abdominal area will be draped and out of view. Don't touch the draped areas, which are sterile.    If you aren't allowed to attend the delivery or aren't comfortable doing so, wait with other friends and family members in the family waiting area.    "[employer] last reviewed this educational content on 2021-2022 The StayWell Company[employer] LLC. All rights reserved. This information is not intended as a substitute for professional medical care. Always follow your healthcare professional's instructions.

## 2023-05-17 NOTE — PROGRESS NOTES
"Laura presents with her mother for a routine PNV at 37w3d.  Feeling normal end of pregnancy discomforts, otherwise well.  Discussed:  1.) Unsure if had Tdap, not noted in [facility] records, discussed r/b and offered today.  \"Will pass for now\".  If decides to get Tdap, encouraged nurse only visit to get as much benefit as possible as at the end of pregnancy.  2.) Anemia.  Discussed Hgb. Taking beef liver capsules, prenatal vitamin (both in the morning, beef liver only at night).  Discussed  iron source from calcium, and consuming with Vitamin C for best absorption.  Iron studies today, previously ordered.  Discussed may recommend IV Venofer infusions based on results.  Understanding.    Reviewed maternal growth chart.  Understands weight gain, discussed risks.  Encouraged walks outside and healthy eating.   Fetus TY.  Desires SVE: 2/70/-2/soft/post.  Russ = 7  RTC 1 week, sooner as needed.    Has CNM numbers and aware to call with DFM, LOF, labor, s/s of PEC, or any questions or concerns.    SIM Joyce CNM    5/17/2023   9:54 AM   "

## 2023-05-20 ENCOUNTER — TELEPHONE (OUTPATIENT)
Dept: MIDWIFE SERVICES | Facility: CLINIC | Age: 28
End: 2023-05-20
Payer: COMMERCIAL

## 2023-05-20 ENCOUNTER — TELEPHONE (OUTPATIENT)
Dept: OBGYN | Facility: CLINIC | Age: 28
End: 2023-05-20
Payer: COMMERCIAL

## 2023-05-20 DIAGNOSIS — O42.90 AMNIOTIC FLUID LEAKING: Primary | ICD-10-CM

## 2023-05-20 DIAGNOSIS — O42.90 AMNIOTIC FLUID LEAKING: ICD-10-CM

## 2023-05-20 NOTE — TELEPHONE ENCOUNTER
PHONE CALL TO ON-CALL CNM:   Laura calls reporting her water broke at 0820.  When she experienced the initial gush, she put a pad on.  Has had multiple gushes since then, wetting several pads.  Fluid is clear, watery, warm when it comes out.  Rock only intermittently at this time.  Baby moving normally.  No bleeding.  GBS negative.    Cervix in clinic a few days ago was 2/70/-2    Has a strong interest in a low-intervention birth.  Desires to stay home and do expectant management at this time.  Willing to consider coming in for a FHR evaluation at 12 hours post ROM, but then may be interested in returning home until 24 hours post ROM if not in labor.  Plan established to have her call back [phone] to make a plan with midwife who is on then.    Reviewed all warning signs.  Patient will call back sooner with change in fluid color, advancing labor, decreased/changed fetal movement, bleeding, fever or other concerns.    All questions answered.

## 2023-05-21 ENCOUNTER — ANESTHESIA (OUTPATIENT)
Dept: OBGYN | Facility: HOSPITAL | Age: 28
End: 2023-05-21
Payer: COMMERCIAL

## 2023-05-21 ENCOUNTER — ANESTHESIA EVENT (OUTPATIENT)
Dept: OBGYN | Facility: HOSPITAL | Age: 28
End: 2023-05-21
Payer: COMMERCIAL

## 2023-05-21 ENCOUNTER — HOSPITAL ENCOUNTER (INPATIENT)
Facility: HOSPITAL | Age: 28
LOS: 1 days | Discharge: HOME | End: 2023-05-22
Attending: ADVANCED PRACTICE MIDWIFE | Admitting: ADVANCED PRACTICE MIDWIFE
Payer: COMMERCIAL

## 2023-05-21 PROBLEM — Z37.9 NORMAL LABOR: Status: ACTIVE | Noted: 2023-05-21

## 2023-05-21 LAB
CREAT SERPL-MCNC: 0.44 MG/DL (ref 0.51–0.95)
GFR SERPL CREATININE-BSD FRML MDRD: >90 ML/MIN/1.73M2
PLATELET # BLD AUTO: 248 10E3/UL (ref 150–450)

## 2023-05-21 PROCEDURE — 258N000003 HC RX IP 258 OP 636: Performed by: ADVANCED PRACTICE MIDWIFE

## 2023-05-21 PROCEDURE — 250N000011 HC RX IP 250 OP 636: Performed by: ANESTHESIOLOGY

## 2023-05-21 PROCEDURE — 250N000009 HC RX 250: Performed by: ADVANCED PRACTICE MIDWIFE

## 2023-05-21 PROCEDURE — 85049 AUTOMATED PLATELET COUNT: CPT | Performed by: ADVANCED PRACTICE MIDWIFE

## 2023-05-21 PROCEDURE — 250N000011 HC RX IP 250 OP 636: Performed by: ADVANCED PRACTICE MIDWIFE

## 2023-05-21 PROCEDURE — 82565 ASSAY OF CREATININE: CPT | Performed by: ADVANCED PRACTICE MIDWIFE

## 2023-05-21 PROCEDURE — 370N000003 HC ANESTHESIA WARD SERVICE: Performed by: ANESTHESIOLOGY

## 2023-05-21 PROCEDURE — 3E0R3BZ INTRODUCTION OF ANESTHETIC AGENT INTO SPINAL CANAL, PERCUTANEOUS APPROACH: ICD-10-PCS | Performed by: ANESTHESIOLOGY

## 2023-05-21 PROCEDURE — 00HU33Z INSERTION OF INFUSION DEVICE INTO SPINAL CANAL, PERCUTANEOUS APPROACH: ICD-10-PCS | Performed by: ANESTHESIOLOGY

## 2023-05-21 PROCEDURE — 250N000013 HC RX MED GY IP 250 OP 250 PS 637: Performed by: ADVANCED PRACTICE MIDWIFE

## 2023-05-21 PROCEDURE — 36415 COLL VENOUS BLD VENIPUNCTURE: CPT | Performed by: ADVANCED PRACTICE MIDWIFE

## 2023-05-21 PROCEDURE — 120N000001 HC R&B MED SURG/OB

## 2023-05-21 PROCEDURE — 0KQM0ZZ REPAIR PERINEUM MUSCLE, OPEN APPROACH: ICD-10-PCS | Performed by: ADVANCED PRACTICE MIDWIFE

## 2023-05-21 PROCEDURE — 722N000001 HC LABOR CARE VAGINAL DELIVERY SINGLE

## 2023-05-21 PROCEDURE — 59410 OBSTETRICAL CARE: CPT | Performed by: ADVANCED PRACTICE MIDWIFE

## 2023-05-21 RX ORDER — NALOXONE HYDROCHLORIDE 0.4 MG/ML
0.2 INJECTION, SOLUTION INTRAMUSCULAR; INTRAVENOUS; SUBCUTANEOUS
Status: DISCONTINUED | OUTPATIENT
Start: 2023-05-21 | End: 2023-05-21 | Stop reason: HOSPADM

## 2023-05-21 RX ORDER — CITRIC ACID/SODIUM CITRATE 334-500MG
30 SOLUTION, ORAL ORAL
Status: DISCONTINUED | OUTPATIENT
Start: 2023-05-21 | End: 2023-05-21 | Stop reason: HOSPADM

## 2023-05-21 RX ORDER — MISOPROSTOL 200 UG/1
800 TABLET ORAL
Status: DISCONTINUED | OUTPATIENT
Start: 2023-05-21 | End: 2023-05-21 | Stop reason: HOSPADM

## 2023-05-21 RX ORDER — MISOPROSTOL 200 UG/1
800 TABLET ORAL
Status: DISCONTINUED | OUTPATIENT
Start: 2023-05-21 | End: 2023-05-22 | Stop reason: HOSPADM

## 2023-05-21 RX ORDER — OXYTOCIN/0.9 % SODIUM CHLORIDE 30/500 ML
340 PLASTIC BAG, INJECTION (ML) INTRAVENOUS CONTINUOUS PRN
Status: DISCONTINUED | OUTPATIENT
Start: 2023-05-21 | End: 2023-05-22 | Stop reason: HOSPADM

## 2023-05-21 RX ORDER — METOCLOPRAMIDE 10 MG/1
10 TABLET ORAL EVERY 6 HOURS PRN
Status: DISCONTINUED | OUTPATIENT
Start: 2023-05-21 | End: 2023-05-21 | Stop reason: HOSPADM

## 2023-05-21 RX ORDER — FENTANYL CITRATE-0.9 % NACL/PF 10 MCG/ML
100 PLASTIC BAG, INJECTION (ML) INTRAVENOUS EVERY 5 MIN PRN
Status: DISCONTINUED | OUTPATIENT
Start: 2023-05-21 | End: 2023-05-21 | Stop reason: HOSPADM

## 2023-05-21 RX ORDER — DIPHENHYDRAMINE HYDROCHLORIDE 50 MG/ML
25 INJECTION INTRAMUSCULAR; INTRAVENOUS EVERY 6 HOURS PRN
Status: DISCONTINUED | OUTPATIENT
Start: 2023-05-21 | End: 2023-05-22 | Stop reason: HOSPADM

## 2023-05-21 RX ORDER — METHYLERGONOVINE MALEATE 0.2 MG/ML
200 INJECTION INTRAVENOUS
Status: DISCONTINUED | OUTPATIENT
Start: 2023-05-21 | End: 2023-05-22 | Stop reason: HOSPADM

## 2023-05-21 RX ORDER — OXYTOCIN 10 [USP'U]/ML
10 INJECTION, SOLUTION INTRAMUSCULAR; INTRAVENOUS
Status: DISCONTINUED | OUTPATIENT
Start: 2023-05-21 | End: 2023-05-22 | Stop reason: HOSPADM

## 2023-05-21 RX ORDER — METOCLOPRAMIDE HYDROCHLORIDE 5 MG/ML
10 INJECTION INTRAMUSCULAR; INTRAVENOUS EVERY 6 HOURS PRN
Status: DISCONTINUED | OUTPATIENT
Start: 2023-05-21 | End: 2023-05-21 | Stop reason: HOSPADM

## 2023-05-21 RX ORDER — DOCUSATE SODIUM 100 MG/1
100 CAPSULE, LIQUID FILLED ORAL DAILY
Status: DISCONTINUED | OUTPATIENT
Start: 2023-05-21 | End: 2023-05-22 | Stop reason: HOSPADM

## 2023-05-21 RX ORDER — KETOROLAC TROMETHAMINE 30 MG/ML
30 INJECTION, SOLUTION INTRAMUSCULAR; INTRAVENOUS
Status: DISCONTINUED | OUTPATIENT
Start: 2023-05-21 | End: 2023-05-22 | Stop reason: HOSPADM

## 2023-05-21 RX ORDER — MISOPROSTOL 200 UG/1
400 TABLET ORAL
Status: DISCONTINUED | OUTPATIENT
Start: 2023-05-21 | End: 2023-05-22 | Stop reason: HOSPADM

## 2023-05-21 RX ORDER — SERTRALINE HYDROCHLORIDE 25 MG/1
25 TABLET, FILM COATED ORAL DAILY
Status: DISCONTINUED | OUTPATIENT
Start: 2023-05-21 | End: 2023-05-22 | Stop reason: HOSPADM

## 2023-05-21 RX ORDER — FENTANYL CITRATE 50 UG/ML
100 INJECTION, SOLUTION INTRAMUSCULAR; INTRAVENOUS
Status: DISCONTINUED | OUTPATIENT
Start: 2023-05-21 | End: 2023-05-21 | Stop reason: HOSPADM

## 2023-05-21 RX ORDER — PROCHLORPERAZINE 25 MG
25 SUPPOSITORY, RECTAL RECTAL EVERY 12 HOURS PRN
Status: DISCONTINUED | OUTPATIENT
Start: 2023-05-21 | End: 2023-05-21 | Stop reason: HOSPADM

## 2023-05-21 RX ORDER — MISOPROSTOL 200 UG/1
400 TABLET ORAL
Status: DISCONTINUED | OUTPATIENT
Start: 2023-05-21 | End: 2023-05-21 | Stop reason: HOSPADM

## 2023-05-21 RX ORDER — IBUPROFEN 800 MG/1
800 TABLET, FILM COATED ORAL EVERY 6 HOURS PRN
Status: DISCONTINUED | OUTPATIENT
Start: 2023-05-21 | End: 2023-05-22 | Stop reason: HOSPADM

## 2023-05-21 RX ORDER — FENTANYL/ROPIVACAINE/NS/PF 2MCG/ML-.1
PLASTIC BAG, INJECTION (ML) EPIDURAL
Status: DISCONTINUED | OUTPATIENT
Start: 2023-05-21 | End: 2023-05-21 | Stop reason: HOSPADM

## 2023-05-21 RX ORDER — ENOXAPARIN SODIUM 100 MG/ML
40 INJECTION SUBCUTANEOUS EVERY 24 HOURS
Status: DISCONTINUED | OUTPATIENT
Start: 2023-05-22 | End: 2023-05-22 | Stop reason: HOSPADM

## 2023-05-21 RX ORDER — HYDROCORTISONE 25 MG/G
CREAM TOPICAL 3 TIMES DAILY PRN
Status: DISCONTINUED | OUTPATIENT
Start: 2023-05-21 | End: 2023-05-22 | Stop reason: HOSPADM

## 2023-05-21 RX ORDER — OXYTOCIN/0.9 % SODIUM CHLORIDE 30/500 ML
100-340 PLASTIC BAG, INJECTION (ML) INTRAVENOUS CONTINUOUS PRN
Status: DISCONTINUED | OUTPATIENT
Start: 2023-05-21 | End: 2023-05-22 | Stop reason: HOSPADM

## 2023-05-21 RX ORDER — MODIFIED LANOLIN
OINTMENT (GRAM) TOPICAL
Status: DISCONTINUED | OUTPATIENT
Start: 2023-05-21 | End: 2023-05-22 | Stop reason: HOSPADM

## 2023-05-21 RX ORDER — ONDANSETRON 2 MG/ML
4 INJECTION INTRAMUSCULAR; INTRAVENOUS EVERY 6 HOURS PRN
Status: DISCONTINUED | OUTPATIENT
Start: 2023-05-21 | End: 2023-05-21 | Stop reason: HOSPADM

## 2023-05-21 RX ORDER — SERTRALINE HYDROCHLORIDE 100 MG/1
100 TABLET, FILM COATED ORAL DAILY
Status: DISCONTINUED | OUTPATIENT
Start: 2023-05-21 | End: 2023-05-22 | Stop reason: HOSPADM

## 2023-05-21 RX ORDER — ONDANSETRON 4 MG/1
4 TABLET, ORALLY DISINTEGRATING ORAL EVERY 6 HOURS PRN
Status: DISCONTINUED | OUTPATIENT
Start: 2023-05-21 | End: 2023-05-21 | Stop reason: HOSPADM

## 2023-05-21 RX ORDER — NALOXONE HYDROCHLORIDE 0.4 MG/ML
0.4 INJECTION, SOLUTION INTRAMUSCULAR; INTRAVENOUS; SUBCUTANEOUS
Status: DISCONTINUED | OUTPATIENT
Start: 2023-05-21 | End: 2023-05-21 | Stop reason: HOSPADM

## 2023-05-21 RX ORDER — BUPIVACAINE HYDROCHLORIDE 2.5 MG/ML
INJECTION, SOLUTION EPIDURAL; INFILTRATION; INTRACAUDAL
Status: COMPLETED | OUTPATIENT
Start: 2023-05-21 | End: 2023-05-21

## 2023-05-21 RX ORDER — PROCHLORPERAZINE MALEATE 10 MG
10 TABLET ORAL EVERY 6 HOURS PRN
Status: DISCONTINUED | OUTPATIENT
Start: 2023-05-21 | End: 2023-05-21 | Stop reason: HOSPADM

## 2023-05-21 RX ORDER — IBUPROFEN 800 MG/1
800 TABLET, FILM COATED ORAL
Status: DISCONTINUED | OUTPATIENT
Start: 2023-05-21 | End: 2023-05-22 | Stop reason: HOSPADM

## 2023-05-21 RX ORDER — DIPHENHYDRAMINE HCL 25 MG
25 CAPSULE ORAL EVERY 6 HOURS PRN
Status: DISCONTINUED | OUTPATIENT
Start: 2023-05-21 | End: 2023-05-22 | Stop reason: HOSPADM

## 2023-05-21 RX ORDER — METHYLERGONOVINE MALEATE 0.2 MG/ML
200 INJECTION INTRAVENOUS
Status: DISCONTINUED | OUTPATIENT
Start: 2023-05-21 | End: 2023-05-21 | Stop reason: HOSPADM

## 2023-05-21 RX ORDER — OXYTOCIN 10 [USP'U]/ML
10 INJECTION, SOLUTION INTRAMUSCULAR; INTRAVENOUS
Status: DISCONTINUED | OUTPATIENT
Start: 2023-05-21 | End: 2023-05-21 | Stop reason: HOSPADM

## 2023-05-21 RX ORDER — CARBOPROST TROMETHAMINE 250 UG/ML
250 INJECTION, SOLUTION INTRAMUSCULAR
Status: DISCONTINUED | OUTPATIENT
Start: 2023-05-21 | End: 2023-05-21 | Stop reason: HOSPADM

## 2023-05-21 RX ORDER — ACETAMINOPHEN 325 MG/1
650 TABLET ORAL EVERY 4 HOURS PRN
Status: DISCONTINUED | OUTPATIENT
Start: 2023-05-21 | End: 2023-05-21 | Stop reason: HOSPADM

## 2023-05-21 RX ORDER — CARBOPROST TROMETHAMINE 250 UG/ML
250 INJECTION, SOLUTION INTRAMUSCULAR
Status: DISCONTINUED | OUTPATIENT
Start: 2023-05-21 | End: 2023-05-22 | Stop reason: HOSPADM

## 2023-05-21 RX ORDER — BISACODYL 10 MG
10 SUPPOSITORY, RECTAL RECTAL DAILY PRN
Status: DISCONTINUED | OUTPATIENT
Start: 2023-05-21 | End: 2023-05-22 | Stop reason: HOSPADM

## 2023-05-21 RX ORDER — OXYTOCIN/0.9 % SODIUM CHLORIDE 30/500 ML
340 PLASTIC BAG, INJECTION (ML) INTRAVENOUS CONTINUOUS PRN
Status: DISCONTINUED | OUTPATIENT
Start: 2023-05-21 | End: 2023-05-21 | Stop reason: HOSPADM

## 2023-05-21 RX ORDER — ACETAMINOPHEN 325 MG/1
650 TABLET ORAL EVERY 4 HOURS PRN
Status: DISCONTINUED | OUTPATIENT
Start: 2023-05-21 | End: 2023-05-22 | Stop reason: HOSPADM

## 2023-05-21 RX ADMIN — IBUPROFEN 800 MG: 800 TABLET ORAL at 18:44

## 2023-05-21 RX ADMIN — LIDOCAINE HYDROCHLORIDE 10 ML: 10 INJECTION, SOLUTION INFILTRATION; PERINEURAL at 10:30

## 2023-05-21 RX ADMIN — DOCUSATE SODIUM 100 MG: 100 CAPSULE, LIQUID FILLED ORAL at 16:39

## 2023-05-21 RX ADMIN — Medication: at 03:06

## 2023-05-21 RX ADMIN — SERTRALINE HYDROCHLORIDE 25 MG: 25 TABLET, FILM COATED ORAL at 13:43

## 2023-05-21 RX ADMIN — SERTRALINE HYDROCHLORIDE 100 MG: 100 TABLET ORAL at 13:42

## 2023-05-21 RX ADMIN — SODIUM CHLORIDE, POTASSIUM CHLORIDE, SODIUM LACTATE AND CALCIUM CHLORIDE 1000 ML: 600; 310; 30; 20 INJECTION, SOLUTION INTRAVENOUS at 02:05

## 2023-05-21 RX ADMIN — KETOROLAC TROMETHAMINE 30 MG: 30 INJECTION, SOLUTION INTRAMUSCULAR; INTRAVENOUS at 12:45

## 2023-05-21 RX ADMIN — Medication: at 18:49

## 2023-05-21 RX ADMIN — WITCH HAZEL: 500 SOLUTION RECTAL; TOPICAL at 18:49

## 2023-05-21 RX ADMIN — BUPIVACAINE HYDROCHLORIDE 5 ML: 2.5 INJECTION, SOLUTION EPIDURAL; INFILTRATION; INTRACAUDAL at 03:10

## 2023-05-21 ASSESSMENT — ACTIVITIES OF DAILY LIVING (ADL)
ADLS_ACUITY_SCORE: 21
ADLS_ACUITY_SCORE: 19
FALL_HISTORY_WITHIN_LAST_SIX_MONTHS: NO
ADLS_ACUITY_SCORE: 19
ADLS_ACUITY_SCORE: 21
ADLS_ACUITY_SCORE: 21
ADLS_ACUITY_SCORE: 19
ADLS_ACUITY_SCORE: 21
ADLS_ACUITY_SCORE: 21
CONCENTRATING,_REMEMBERING_OR_MAKING_DECISIONS_DIFFICULTY: NO
ADLS_ACUITY_SCORE: 21
DIFFICULTY_EATING/SWALLOWING: NO
DOING_ERRANDS_INDEPENDENTLY_DIFFICULTY: NO
TOILETING_ISSUES: NO
ADLS_ACUITY_SCORE: 21
WALKING_OR_CLIMBING_STAIRS_DIFFICULTY: NO
WEAR_GLASSES_OR_BLIND: NO
ADLS_ACUITY_SCORE: 21
CHANGE_IN_FUNCTIONAL_STATUS_SINCE_ONSET_OF_CURRENT_ILLNESS/INJURY: NO
ADLS_ACUITY_SCORE: 19
DRESSING/BATHING_DIFFICULTY: NO

## 2023-05-21 ASSESSMENT — LIFESTYLE VARIABLES: TOBACCO_USE: 1

## 2023-05-21 NOTE — PROGRESS NOTES
Labor Progress Note:    Patient Name:  Laura Corral  :      1995  MRN:      [id number]      CNM in to see Laura earlier and was sleeping soundly.  Introduced myself to her mom and agreed to come back ~0730 for a cervical exam.      Subjective:  Laura is comfortable and not experiencing any pain.  Still tired and sleeping well.  Not noticing contractions.  Sips PO fluid.  Pérez draining well.  Spouse Ino and mom at bedside and supportive.      Objective:  Temp:  [97.8  F (36.6  C)-98.3  F (36.8  C)] 98.3  F (36.8  C)  Resp:  [16-18] 16  BP: (101-155)/(54-97) 111/55  SpO2:  [93 %-100 %] 95 %  /55   Temp 98.3  F (36.8  C) (Axillary)   Resp 16   LMP 2022   SpO2 95%     FHR: 130, mod variability, accels present, no decels.   Contractions: q 1.5-5 minutes with some coupling, strong to palpation.  Cervix: 10, 100%, +1, cephalic, bloody show present.      Assessment:    -at 38w0d, with spontaneous labor following SROM.  -Second stage  -Ruptured membranes X 23 hours, clear fluid  -Category I FHR  -BMI 32 with more than expected pregnancy weight gain.  -Tobacco use in pregnancy.  -INGE and depression- on Sertraline 125mg daily.  -Anemia during pregnancy      Plan:   - As comfortable with epidural and not feeling pressure or an urge to push, will plan to labor down X1 hour and then start pushing.  Moved to R lateral/running man position.  -Anticipate progress and vaginal birth.         Total time spent with patient: 20 minutes, of which >50% time spent counseling and coordination of care.      Provider:  JULY Valencia        Date:  2023  Time:  7:50 AM

## 2023-05-21 NOTE — PROGRESS NOTES
Labor Progress Note:    Patient Name:  Laura Corral  :      1995  MRN:      [id number]    Assessment:    -28 year old,  at 38w0d, active labor after SROM  -Stable maternal and fetal well-being  -GBS negative.   -Blood type A positive.  -BMI 32 with more than expected pregnancy weight gain.  -Tobacco use in pregnancy.  -INGE and depression- on Sertraline 125mg daily  -Anemia during pregnancy    Plan:   -Epidural per MDA.   -Continuous EFM and toco monitoring.  -Reevaluate labor progress once comfortable with epidural.     Subjective:  Cesar has been laboring standing at the bedside, in the shower and in a hands and knees position. She had sterile water papules injected into her back for back pain with little relief. She then used nitrous oxide. She elected to move toward and epidural. So while using nitrous oxide an IV fluid bolus was initiated for an epidural. She labored with the support of RN, LUIS, Ino and Denisse. Taking sips of PO fluids. Voiding without issue.     Objective:  /63   Temp 97.8  F (36.6  C) (Oral)   Resp 18   LMP 2022   SpO2 100%     FHR by IA: 140 baseline, increases heard, no decreases heard.    Contractions: q 2-4 minutes, 60-80 sec in duration, moderate to palpation. Soft resting tone.   Cervix: deferred    Total time spent with patient: 80 minutes, of which >50% time spent counseling and coordination of care.    Provider:  SIM Wolff CNM    Date:  2023  Time:  2:30 AM

## 2023-05-21 NOTE — CONFIDENTIAL NOTE
"Telephone call:    Laura is a 29yo  at 37w6d who calls at 19:45 to report she is \"in labor\". She is now feeling contractions q6-7 minutes, lasting 60-90 seconds. Some are more intense than others. Has been feeling comfortable laboring at home and prefers to stay put for now. Labor isn't \"terrible\" and feels she is tolerating her contractions well at home with the support of her  and mother. Denies fever, chills, bleeding or changes in fetal movements. Reports she is continuing to leak clear fluid. Feels strongly that she wants to stay home at this time.    Discussed proposed plan with previous CNM that she plan to present at 12 hours post-rupture for evaluation, NST, and ultrasound to check fetal position. Pt states since she is starting to labor, she feels most comfortable at home. Feels baby is moving normally and is sure baby is head down.     Reviewed again options for expectant management vs IOL, specifically: Discussed that about 8-10% of women have their water break before they go into labor. Reviewed ACOG's recommendationto induce labor immediately after PROM. Women whose membranes release before labor are at higher risk for developing chorioamnionitis, an intrauterine/membrane infection. Reviewed evidence regarding safety of expectant management for up to 24 hours, but this wrter does continue to recommend she present now, 12 hours post rupture, for an initial evaluation. Pt acknowledges recommendation but continues to decline. She desires to continue with expectant management at this time and is hopeful her labor will progress overnight.     Since pt plans to stay home, overnight, this writer recommended she eat small frequent meals, hydrate well and balance active activities (if she desires to stimulate labor) with restful ones. Recommended that she check her temperature every 2-3 hours while she is awake and to call if her temperature is >100.4 (38 C). I also advised her to do kick counts " "and let us know if she is noticing any decreased fetal movement.       If she does not present to the birthplace in labor overnight, strongly recommend that she call on-call CNM tomorrow morning between 7-8am and plan evaluation then (24 hours post- SROM). She is in agreement with this plan. \"I definitely will come in by 24 hours\". All questions answered.   "

## 2023-05-21 NOTE — L&D DELIVERY NOTE
OB Vaginal Delivery Note    Laura Corral MRN# [id number]   Age: 28 year old YOB: 1995       GA: 38w0d  GP:   Labor Complications: None   Delivery QBL: 885 mL  Delivery Type: Vaginal, Spontaneous   ROM to Delivery Time: (Delivered) Hours: 25 Minutes: 51  [place] Weight:  (not yet available at the time of this note)   1 Minute 5 Minute 10 Minute   Apgar Totals: 8   9        SALOMON BEVERLY;ALVERTO BURTON     Delivery Details:    Cesar started pushing at about 0900.  Great maternal effort and fetal movement felt with each contraction.  Initially pushed in semi-reclined position, then moved to hands/knees, and finally to left and right lateral.      NSVB of viable, vigorous female infant at 1011.  Baby born TY and restituted to LOP.  Shoulders birthed with ease and then Cesar's mom (baby's maternal grandmother) helped to lift the baby into Cesar's waiting arms.  Delayed cord clamping done, and at 4-5 minutes of age baby's cord double clamped by LUIS and cut by SMITHA Mckinnon.  Apgar 8 & 9.    IV pitocin hung after the birth of the baby.  Placenta birthed spontaneously, appears intact and has 3VC.  Placenta time 1018.  Uterus had clots expel initially, but then firmed relatively quickly.  Further QBL volume came from bleeding laceration site.  Total .    Inspection of genital area finds 2nd degree perineal laceration.  Repaired in usual fashion with 3-0 Vicryl suture, utilizing epidural for pain management and some additional lidocaine for pain near the skin surface.  Bilateral shallow labial lacerations with spontaneous hemostasis and no repair done.    Mom and baby stable in 4th stage of labor.  Breastfeeding planned.         [place] Female-Laura [[id number]]    Labor Event Times    Latent labor onset date/time: 2023    Active labor onset date: 23 Onset time:  4:27 AM CDT   Dilation complete date: 23 Complete time:  7:42 AM   Start pushing date/time:  2023 0900      Labor Events     labor?: No   steroids: None  Labor Type: Spontaneous     Antibiotics received during labor?: No     Rupture date/time: 23 0820   Rupture type: Spontaneous Rupture of Membranes  Fluid color: Clear  Fluid odor: Normal     Augmentation: None     Delivery/Placenta Date and Time    Delivery Date: 23 Delivery Time: 10:11 AM   Placenta Date/Time: 2023 10:18 AM  Oxytocin given at the time of delivery: after delivery of baby  Delivering clinician: Delisa Cruz APRN CNM   Other personnel present at delivery:  Provider Role   Kylie Harley RN    [place], Shae ADAME RN          Vaginal Counts     Initial count performed by 2 team members:  Two Team Members   Nancy Harley        Needles Suture Needles Sponges (RETIRED) Instruments   Initial counts 0 0 5    Added to count 1 2     Relief counts  1     Final counts 1 3 5          Placed during labor Accounted for at the end of labor   FSE No NA   IUPC No NA   Cervidil No NA                     Apgars    Living status: Living   1 Minute 5 Minute 10 Minute 15 Minute 20 Minute   Skin color: 1  1       Heart rate: 2  2       Reflex irritability: 2  2       Muscle tone: 1  2       Respiratory effort: 2  2       Total: 8  9       Apgars assigned by: SIRIA NEGRON     Cord    Vessels: 3 Vessels    Cord Complications: None   Cord Blood Disposition: Discard      Gases Sent?: No      Delayed cord clamping?: Yes      Cord Clamping Delay (seconds): >120 seconds      Stem cell collection?: No                      Resuscitation    Methods: None     Labor Events and Shoulder Dystocia    Fetal Tracing Prior to Delivery: Category 1  Shoulder dystocia present?: Neg     Delivery (Maternal) (Provider to Complete) ([id number])    Episiotomy: None  Perineal lacerations: 2nd Repaired?: Yes   Labial laceration: bilateral Repaired?: No   Repair suture: 3-0 Vicryl  Number of repair packets: 2  Genital tract  inspection done: Pos     Blood Loss  Mother: Laura Corral #[id number]   Start of Mother's Information    Delivery Blood Loss  05/21/23 0427 - 05/21/23 1206    Delivery QBL (mL) Hospital Encounter 885 mL    Total  885 mL         End of Mother's Information  Mother: Laura Corral #[id number]          Delivery - Provider to Complete ([id number])    Delivering clinician: Delisa Cruz APRN CNM  Delivery Type (Choose the 1 that will go to the Birth History): Vaginal, Spontaneous    Other personnel:  Provider Role   Kylie Harley RN    Meredith, Shae ADAME RN                                Placenta    Date/Time: 5/21/2023 10:18 AM  Removal: Spontaneous  Disposition: Hospital disposal           Anesthesia    Method: Epidural                Presentation and Position    Presentation: Vertex    Position: Left Occiput Anterior                   Total time spent with patient: 90 minutes, >50% time spent counseling and coordination of care.          Provider:  SIM Valencia/LUIS          Date:  5/21/2023  Time:  1:36 PM

## 2023-05-21 NOTE — ANESTHESIA PROCEDURE NOTES
Epidural catheter Procedure Note    Pre-Procedure   Staff -        Anesthesiologist:  Pankaj Garcia MD       Performed By: anesthesiologist       Location: OB       Procedure Start/Stop Times: 5/21/2023 2:50 AM and 5/21/2023 3:10 AM       Pre-Anesthestic Checklist: patient identified, IV checked, risks and benefits discussed, informed consent, monitors and equipment checked and pre-op evaluation  Timeout:       Correct Patient: Yes        Correct Procedure: Yes        Correct Site: Yes        Correct Position: Yes   Procedure Documentation  Procedure: epidural catheter       Diagnosis: Labor       Patient Position: sitting       Patient Prep/Sterile Barriers: sterile gloves, mask, patient draped       Skin prep: Chloraprep       Local skin infiltrated with 5 mL of 1% lidocaine.        Insertion Site: L4-5. (midline approach).       Technique: LORT saline        AMINA at 6 cm.       Needle Type: EzFlop - A First of Its Kind Flip Flop       Needle Gauge: 18.        Needle Length (Inches): 3.5        Catheter: 20 G.          Catheter threaded easily.           Threaded 11 cm at skin.         # of attempts: 1 and  # of redirects:     Assessment/Narrative         Paresthesias: No.       Test dose of 5 mL lidocaine 1.5% w/ 1:200,000 epinephrine at 03:07 CDT.         Test dose negative, 3 minutes after injection, for signs of intravascular, subdural, or intrathecal injection.       Insertion/Infusion Method: LORT saline       Aspiration negative for Heme or CSF via Epidural Catheter.    Medication(s) Administered   0.25% Bupivacaine PF (Epidural) - EPIDURAL   5 mL - 5/21/2023 3:10:00 AM  Medication Administration Time: 5/21/2023 2:50 AM     Comments:  After discussion of risks and benefits of a labor epidural including risk of bleeding, hypotension, failed epidural, infection, and headache. The patient agreed to placement of an epidural. Hands washed. The skin over the lower back was prepped and draped using sterile technique with sterile prep,  "drape, gloves, and hat. Sterile prep was allowed to dry for 3 minutes. Lidocaine was used subcutaneously to provide analgesia during placement. Loss of resistance technique was used with saline. The epidural catheter was threaded easily. Negative aspiration of heme/CSF from epidural catheter. Negative test dose of Lidocaine 1.5% with epi. Epidural catheter secured using sterile tegaderm and tape. L&D RN was present the entire procedure.           FOR [facility] ([facility]) ONLY:   Pain Team Contact information: please page the Pain Team Via [employer]. Search \"Pain\". During daytime hours, please page the attending first. At night please page the resident first.      "

## 2023-05-21 NOTE — H&P
Labor & Delivery Admission Note:    Date: 2023  Time: 1:00 AM    Admission H&P  Laura Corral,  1995, MRN [id number]    Normal labor [O80, Z37.9]    PCP: Maggi Pichardo, [phone]   Code status:  Full Code       Extended Emergency Contact Information  Primary Emergency Contact: Adin Chen  Address: [address] [place] [address] [facility]  Home Phone: [phone]  Mobile Phone: [phone]  Relation: Spouse  Secondary Emergency Contact: SHE HOGUE  Address: [address] [place] [id number] [facility]  Home Phone: [phone]  Mobile Phone: [phone]  Relation: Mother       Chief Complaint:  Leaking amniotic fluid since 08:20 23 with onset of active labor within 24 hours       HPI:    Laura Corral, is a 28 year old,  at 38w0d, Estimated Date of Delivery: 2023, admitted to [facility] on 2023 at 00:18 secondary to SROM @ 08:20 clear fluid on 23 with onset of active labor following. States her contractions became stronger and closer together after speaking with CNM at 20:00.  Denies changes in fetal movements, bleeding. Accompanied by Ino and her mother Denisse.    Prenatal History:  Laura Corral began care with [facility] at the [facility] on 23 at 33 weeks gestation with regular care thereafter for a total of 4 visits. Pregnancy remarkable for: BMI 32 with more than expected pregnancy weight gain, Tobacco use in pregnancy, INGE and depression- on Sertraline 125mg daily, Anemia during pregnancy.    Laura Corral transferred care from [facility] on 23 at 33 weeks gestation after having started antepartum care on 10/18/22 at 7 weeks gestation. She had a total of 7 visits before transfer of care.      Pre-pregnant BMI: 32    Total weight gain:  39 lbs    Pertinent Labs:  See prenatal record  for lab results.     Pertinent Radiology:  See prenatal record for prenatal radiology reports.     OB History    Para Term  AB Living   1 0 0 0 0 0   SAB IAB Ectopic Multiple Live Births   0 0 0 0 0      # Outcome Date GA Lbr Vu/2nd Weight Sex Delivery Anes PTL Lv   1 Current                Medical History  Past Medical History:   Diagnosis Date     Chronic bilateral low back pain      High blood pressure due to oral contraceptive     Normotensive after discontinuing        Surgical History  She  has a past surgical history that includes Register Tooth Extraction and CREATE EARDRUM OPENING,GEN ANESTH ().       Social History  Reviewed, and she  reports that she has been smoking cigarettes. She has been smoking an average of .25 packs per day. She has never used smokeless tobacco. She reports that she does not currently use alcohol. She reports that she does not use drugs.        Family History  Reviewed, and family history includes Alcoholism in her father and maternal aunt; Anemia in her mother; Anesthesia Reaction in her maternal aunt; Arthritis in her maternal aunt; Asthma in her maternal aunt and mother; Cancer - colorectal in her maternal grandfather; Cardiovascular (age of onset: 64) in her maternal grandmother; Child Abuse in her father, maternal aunt, and mother; Depression in her father, maternal aunt, maternal grandfather, maternal grandmother, paternal grandfather, and paternal grandmother; Hypertension in her maternal grandfather, maternal grandmother, mother, paternal grandfather, and paternal grandmother; Migraines in her maternal aunt, maternal uncle, and mother; No Known Problems in her sister; Pancreatic Cancer in her maternal grandfather.   Psychosocial Needs  Social History     Social History Narrative     Not on file     Additional psychosocial needs reviewed per nursing assessment.       Allergies   Allergen Reactions     Latex Hives     Precaution      Medications Prior to  Admission   Medication Sig Dispense Refill Last Dose     magnesium 250 MG tablet Take 1 tablet by mouth daily        omeprazole (PRILOSEC) 20 MG DR capsule Take 1 capsule (20 mg) by mouth daily 30 capsule 1      ondansetron (ZOFRAN) 4 MG tablet Take by mouth every 8 hours as needed for nausea        Prenatal Vit-Fe Fumarate-FA (PRENATAL MULTIVITAMIN W/IRON) 27-0.8 MG tablet Take 1 tablet by mouth daily        sertraline (ZOLOFT) 100 MG tablet TAKE 1 TABLET(100 MG) BY MOUTH DAILY IN ADDITION TO 25 MG TABLET TO= 125 MG due for appt with primary care provider before further fills 90 tablet 0      sertraline (ZOLOFT) 25 MG tablet TAKE 1 TABLET(25 MG) BY MOUTH DAILY IN ADDITION  MG TABLET TO= 125 MG provider appt due before further fills. 90 tablet 0         Review of Systems:  Pertinent items are noted in HPI.   Physical Exam:  Resp:  [18] 18  BP: (128)/(84) 128/84  SpO2:  [100 %] 100 %    General appearance:  Laboring, coping well  Psych: AAO x 3  Skin: Pink, warm & dry  HEENT: unremarkable  Heart:  well perfused.   Lungs:  non-labored breathing pattern  Abdomen:  Soft, non-tender, gravid with S ~ D, LOT position per lelorraine.  Legs:  Trace edema, moves freely.      Cervical Exam: 5 cm, 70% effaced, -1 station. Soft consistency, anterior position. Cephalic presentation. Ruptured Membranes. Bloody show present. Palpation of fetal sutures confirms cephalic presentation.     Fetal Heart Rate: baseline 125, moderate variability, 15 X 15 accelerations present, decelerations absent.     Uterine Activity: q 2-4 minutes, 60-80 sec in duration, moderate to palpation. Soft resting tone.    Notable AP/IP factors to date:  -GBS negative.   -Blood type A positive.  -Desires low intervention waterbirth.  -BMI 32 with more than expected pregnancy weight gain.  -Tobacco use in pregnancy.  -INGE and depression- on Sertraline 125mg daily  -Anemia during pregnancy  -Ruptured membranes X 17 hours, clear fluid.      Impression:  Laura Corral is a 28 year old year old at 38w0d weeks, Category 1 FHTs, early/active labor.      Plan:  [facility]  -Intermittent fetal monitoring per nursing guidelines.   -Regular diet, encouraged PO fluids.  -Ambulate, position changes to promote labor progress.   -Discussed possibility of  abstinence syndrome with selective serotonin reuptake inhibitor use in pregnancy.   -Anticipate spontaneous vaginal birth    Total time with patient:  40 minutes at bedside and in the [facility] obtaining and clarifying the above history, performing the physical exam, education and counseling and developing this plan of care.  >50% spent on counseling and coordination of care.      Provider: Lana Eid CNM, SIM, LUIS

## 2023-05-21 NOTE — PROGRESS NOTES
Pt has been up swaying and rocking, showered, given nitrous oxide for pain management. Epidural requested and OK'd by CNM. IV initiated and LR bolus started at 02:05. Pt on hands and knees leaning over HOB with nitrous oxide. Support persons, Ino and Yesica at bedside.

## 2023-05-21 NOTE — PROGRESS NOTES
LUIS and RN at bedside at 0740, introduced ourselves to patient and support persons.   SVE per CNM 10/100/+1. Bloody show present.   Patient to continue to rest and labor down for another hour and to evaluate for pushing at that time.

## 2023-05-21 NOTE — ANESTHESIA PREPROCEDURE EVALUATION
Anesthesia Pre-Procedure Evaluation    Patient: Laura Corral   MRN: [id number] : 1995        Procedure :           Past Medical History:   Diagnosis Date     Chronic bilateral low back pain      High blood pressure due to oral contraceptive     Normotensive after discontinuing      Past Surgical History:   Procedure Laterality Date     WISDOM TOOTH EXTRACTION       [facility] CREATE EARDRUM OPENING,GEN ANESTH  1996      Allergies   Allergen Reactions     Latex Hives     Precaution      Social History     Tobacco Use     Smoking status: Every Day     Packs/day: 0.25     Types: Cigarettes     Smokeless tobacco: Never     Tobacco comments:     Smokes less than 5 per day   Vaping Use     Vaping status: Never Used   Substance Use Topics     Alcohol use: Not Currently     Comment: rare      Wt Readings from Last 1 Encounters:   23 94.8 kg (209 lb)        Anesthesia Evaluation            ROS/MED HX  ENT/Pulmonary:  - neg pulmonary ROS   (+) tobacco use, Current use,     Neurologic:  - neg neurologic ROS     Cardiovascular:  - neg cardiovascular ROS     METS/Exercise Tolerance:     Hematologic:  - neg hematologic  ROS     Musculoskeletal:       GI/Hepatic:  - neg GI/hepatic ROS     Renal/Genitourinary:       Endo:     (+) Obesity,     Psychiatric/Substance Use:     (+) psychiatric history anxiety and depression     Infectious Disease:       Malignancy:       Other:            Physical Exam    Airway             Respiratory Devices and Support         Dental  no notable dental history         Cardiovascular   cardiovascular exam normal          Pulmonary   pulmonary exam normal                OUTSIDE LABS:  CBC:   Lab Results   Component Value Date    WBC 10.7 2023    WBC 9.8 10/18/2022    HGB 10.8 (L) 05/10/2023    HGB 10.6 (L) 2023    HCT 31.0 (L) 2023    HCT 37.6 10/18/2022     2023     10/18/2022     BMP:   Lab Results   Component Value Date     (L)  04/20/2023    POTASSIUM 3.7 04/20/2023    CHLORIDE 99 04/20/2023    CO2 19 (L) 04/20/2023    BUN 5.3 (L) 04/20/2023    CR 0.51 04/20/2023    GLC 93 04/20/2023     COAGS: No results found for: PTT, INR, FIBR  POC:   Lab Results   Component Value Date    HCG Negative 09/17/2014     HEPATIC:   Lab Results   Component Value Date    ALBUMIN 3.7 04/20/2023    PROTTOTAL 6.5 04/20/2023    ALT 15 04/20/2023    AST 20 04/20/2023    ALKPHOS 100 04/20/2023    BILITOTAL <0.2 04/20/2023     OTHER:   Lab Results   Component Value Date    BESSIE 9.6 04/20/2023       Anesthesia Plan    ASA Status:  3      Anesthesia Type: Epidural.              Consents    Anesthesia Plan(s) and associated risks, benefits, and realistic alternatives discussed. Questions answered and patient/representative(s) expressed understanding.    - Discussed:     - Discussed with:  Patient, Spouse         Postoperative Care            Comments:    Other Comments: Patient requests labor epidural. Chart reviewed, including labs. Patient interviewed and examined at bedside with RN present throughout. Discussed the procedure of epidural placement, expectations, and risks including but not limited to: bleeding, infection, damage to tissues under the skin (nerves, muscles, blood vessels), hypotension, headache, and epidural failure. All questions were answered. The patient consents to proceed with elective labor epidural placement         neg OB ROS.       Pankaj Garcia MD

## 2023-05-21 NOTE — PROGRESS NOTES
D:  28 year old,  arrived to L&D ambulatory escorted by Ino () and Yesica (mom). Reports SROM of clear fluid at 08:20. Contractions had been irregular until 12:00, now reportedly every 2-3 minutes. Reports positive fetal movement. Admitted to L&D. Admission process reviewed with patient, questions elicited and answered by nurse. Pt agrees to EFM for admission FHR tracing. FHR accelerations noted with moderate variability in normal range. Admission assessment completed. Reviewed pain scale with patient and discussed her plan for pain management--she desires unmedicated labor. See labor flowsheet.    A: EFM applied with consent, prenatal record, history updated, assessment completed. POC discussed with pt and Ino. Lana Eid CNM notified of patient status.    R:  Pt and Ino verbalized understanding of and agree to POC.

## 2023-05-21 NOTE — PLAN OF CARE
Problem: Labor  Goal: Hemostasis  Outcome: Progressing  Goal: Stable Fetal Wellbeing  Outcome: Progressing  Intervention: Promote and Monitor Fetal Wellbeing  Recent Flowsheet Documentation  Taken 5/21/2023 0044 by Vaishali Saeed RN  Body Position: position changed independently  Goal: Effective Progression to Delivery  Outcome: Progressing  Goal: Absence of Infection Signs and Symptoms  Outcome: Progressing  Goal: Acceptable Pain Control  Outcome: Progressing  Goal: Normal Uterine Contraction Pattern  Outcome: Progressing   Goal Outcome Evaluation:      Plan of Care Reviewed With: patient, spouse, parent    Overall Patient Progress: improvingOverall Patient Progress: improving

## 2023-05-21 NOTE — PROGRESS NOTES
CNM and RN performed saline injections with pt informed consent. Pt endorses feeling some comfort with injections.

## 2023-05-21 NOTE — PROGRESS NOTES
Labor Progress Note:    Patient Name:  Laura Corral  :      1995  MRN:      [id number]    Assessment:    -28 year old,  at 38w0d, active labor after SROM.  -Ruptured membranes X 20 hours, clear fluid  -Stable maternal and fetal well-being.  -GBS negative.   -Blood type A positive.  -BMI 32 with more than expected pregnancy weight gain.  -Tobacco use in pregnancy.  -INGE and depression- on Sertraline 125mg daily.  -Anemia during pregnancy.    Plan:   -Pérez urinary catheter.   -Positioning with peanut ball to facilitate labor progress and fetal descent.   -Continue with expectant management. Re-evaluate in 2 hours or sooner with a change in maternal or fetal status.     Subjective:  Cesar is comfortable after epidural placement and resting on her side. Thankful to have pain relief! Denies feeling pressure or urge to push at this time. Has not yet fallen asleep. Ino and mother supportive at her side.     Objective:  /54   Temp 97.8  F (36.6  C) (Oral)   Resp 18   LMP 2022   SpO2 100%     FHR: 140 baseline, moderate variability, 15 X 15 accels present, no decels present.   Contractions: q 2-4 minutes, 60-80 sec in duration, medium to palpation. Soft resting tone.   Cervix: 7cm, 90%, -1, anterior, soft. Suspect LOT/LOP.      Total time spent with patient: 15 minutes, of which >50% time spent counseling and coordination of care.    Provider:  SIM Wolff CNM    Date:  2023  Time:  4:24 AM

## 2023-05-21 NOTE — ANESTHESIA POSTPROCEDURE EVALUATION
Patient: Laura Corral    Procedure: * No procedures listed *       Anesthesia Type:  Epidural    Note:  Disposition: Inpatient   Postop Pain Control: Uneventful            Sign Out: Well controlled pain   PONV: No   Neuro/Psych: Uneventful            Sign Out: Acceptable/Baseline neuro status   Airway/Respiratory: Uneventful            Sign Out: Acceptable/Baseline resp. status   CV/Hemodynamics: Uneventful            Sign Out: Acceptable CV status; No obvious hypovolemia; No obvious fluid overload   Other NRE: NONE   DID A NON-ROUTINE EVENT OCCUR? No    Event details/Postop Comments:    Laura Corral status post labor epidural for delivery. Satisfied with anesthetic care.    /69   Temp 37.1  C (98.8  F) (Oral)   Resp 20   LMP 08/28/2022   SpO2 99%       Denies headache. Neurologically intact as epidural resolves. No apparent anesthetic complications. No follow up indicated.                Last vitals:  Vitals:    05/21/23 0900 05/21/23 1100 05/21/23 1119   BP: 131/73 134/70 125/69   Resp:   20   Temp: 37  C (98.6  F)  37.1  C (98.8  F)   SpO2: 99%         Electronically Signed By: Beck Warner MD  May 21, 2023  12:17 PM

## 2023-05-22 VITALS
TEMPERATURE: 98.2 F | RESPIRATION RATE: 17 BRPM | DIASTOLIC BLOOD PRESSURE: 77 MMHG | HEART RATE: 75 BPM | OXYGEN SATURATION: 98 % | BODY MASS INDEX: 37.8 KG/M2 | SYSTOLIC BLOOD PRESSURE: 133 MMHG | WEIGHT: 200.05 LBS

## 2023-05-22 PROCEDURE — 250N000013 HC RX MED GY IP 250 OP 250 PS 637: Performed by: ADVANCED PRACTICE MIDWIFE

## 2023-05-22 PROCEDURE — 250N000011 HC RX IP 250 OP 636: Performed by: ADVANCED PRACTICE MIDWIFE

## 2023-05-22 RX ORDER — IBUPROFEN 600 MG/1
600 TABLET, FILM COATED ORAL EVERY 6 HOURS PRN
COMMUNITY
Start: 2023-05-22 | End: 2023-10-04

## 2023-05-22 RX ADMIN — ACETAMINOPHEN 650 MG: 325 TABLET ORAL at 05:12

## 2023-05-22 RX ADMIN — ACETAMINOPHEN 650 MG: 325 TABLET ORAL at 00:51

## 2023-05-22 RX ADMIN — IBUPROFEN 800 MG: 800 TABLET ORAL at 14:26

## 2023-05-22 RX ADMIN — DOCUSATE SODIUM 100 MG: 100 CAPSULE, LIQUID FILLED ORAL at 09:27

## 2023-05-22 RX ADMIN — IBUPROFEN 800 MG: 800 TABLET ORAL at 00:51

## 2023-05-22 RX ADMIN — SERTRALINE HYDROCHLORIDE 100 MG: 100 TABLET ORAL at 09:28

## 2023-05-22 RX ADMIN — ACETAMINOPHEN 650 MG: 325 TABLET ORAL at 14:26

## 2023-05-22 RX ADMIN — SERTRALINE HYDROCHLORIDE 25 MG: 25 TABLET, FILM COATED ORAL at 09:29

## 2023-05-22 RX ADMIN — ACETAMINOPHEN 650 MG: 325 TABLET ORAL at 09:27

## 2023-05-22 RX ADMIN — ENOXAPARIN SODIUM 40 MG: 40 INJECTION SUBCUTANEOUS at 14:28

## 2023-05-22 ASSESSMENT — ACTIVITIES OF DAILY LIVING (ADL)
ADLS_ACUITY_SCORE: 18

## 2023-05-22 NOTE — LACTATION NOTE
"This note was copied from a baby's chart.  This writer met with Laura \"Cesar\" x 2 today for 1+ hours each.  Education given on hand expression, the importance of optimal positioning for deep, comfortable latch and effective milk transfer, the use of breast compression to assist with milk transfer, listening for swallows, the importance of feeding baby on early hunger cues, and breastfeeding 8-12 times in 24 hours for optimal infant nutrition and hydration as well as for building an optimal milk supply.  She was encouraged to follow up at the [facility] after discharge for any breastfeeding questions or concerns.  After education, Cesar was able to easily hand express colostrum from her breast and knows how to position infant correctly at the breast and how to latch infant onto the breast.  She knows how to properly place the nipple shield onto the breast.  Care plans below given and discussed with Cesar.      This morning, infant able to latch onto the breast but could not sustain a suction.  Several attempts were made.  Expressed colostrum spoon fed to infant; then attempted the breast again.  No latch achieved, as infant \"bites\" down on the gloved finger of this writer.  Infant unable to keep tongue forward and create a suction with rhythmic sucks.  After several minutes of attempting, Cesar instructed to pump.  This writer set up the hospital grade breast pump (HGP) and instructed her on its use and assembly and cleaning of the pump parts.  Cesar instructed to feed infant all of the expressed colostrum and call at next feeding.      This writer stopped in to see Cesar this afternoon.  She reports she did not get any colostrum when she pumped, so infant did not have anything to eat.  Reminded to feed infant at least 5-15 ml per feeding, at least 8 times a day, as infant has utilized its stores and needs to get food into the stomach at least 8 times a day (see care plan for guidelines).  This writer had " "infant go to the breast again.  Cesar first hand expressed to get colostrum flowing.  Infant spoon fed 1 ml, then latched onto each breast with the nipple shield.  Infant would not rhythmically suck; however, infant no longer \"bites\" at the breast.  This writer encouraged to offer infant donor milk.  Grandmother of infant (who was an RN in [facility] 14 years ago) requests infant be fed with SNS at the breast.  SNS tube placed under the nipple shield.  Infant able to suck and swallow at the breast.  After 5 ml, infant's grandmother shut off the SNS and wanted to see if infant would swallow at the breast.  Infant able to actively suck with several swallows heard and swallows increased when breast compression was used.  Instructed to use breast compression throughout the feeding to assists with milk transfer and to offer the other breast also.  Instructed to give infant the rest of the donor milk (15 ml total) if infant is not satisfied after the breastfeeding session.      Instructed to Cesar and Cesar's mother (infant's grandmother) that infant needs at least 5-15 ml of colostrum/ donor milk or formula each feeding if infant does not latch onto the breast.  Monitor infant's output and if not meeting criteria, feed infant more food.  If infant is sleepy and will not latch, feed infant. Call infant's provider with any concerns and follow up at outpatient lactation prn.  Grandmother states she is confident that infant will do well and she has formula to feed infant prn.  Cesar verbalizes understanding of all education given.  She denies any further questions.        Care Plan - Latch Difficulty     Place baby skin to skin on mom's chest up to an hour before feeding.   Attempt breastfeeding on infant's early hunger cues (hand in mouth, rooting).  Position baby in cross cradle or football hold, which may help achieve latch.   If latched, watch for rhythmic sucking and occasional swallows.  Limit latch attempts to 5-10 minutes.  If " latch difficulty or few/no swallows noted:  Hand express colostrum and offer via spoon before or after feeding attempt to increase baby's energy level.  Pump breasts for 15 minutes to stimulate milk production.   Feed expressed milk to baby using the amounts below as a guideline, give more as baby cues.  If necessary, make up the difference with donor milk or formula as a bridge until milk supply increases:    0-24 hours     2-10 ml each feeding (may use spoon)  24-48 hours   5-15 ml each feeding  48-72 hours   15-30 ml each feeding  72-96 hours   30-60 ml each feeding     Contact Outpatient [facility]actation Clinic after discharge as needed. [phone]            12/2021    Care Plan for Nipple Shield Use    How to use:  Wash in warm, soapy water. May leave moist to help stick to the skin.  Invert the nipple shield   way inside out and place over nipple.   the wings and stretch the nipple shield, easing the shield right side out and onto the nipple.  The shield should fit comfortably without pinching.  Latch baby deeply. Baby's lips should reach the flat base with entire nipple in baby's mouth.    Watch for:  Rhythmic sucking and swallowing.  Content baby after feeding.  Adequate wet & dirty diapers (per feeding log).  If baby not meeting above goals, pump breasts for 15 minutes to stimulate milk supply.    Weaning from a Nipple Shield:  Some babies need the nipple shield for a few days or a few weeks.  Once feeding improves, remove the nipple shield after a few minutes of breastfeeding and try to latch baby without the nipple shield.      Follow up with Outpatient [facility]actation Clinic is recommended - [phone].    12/2021

## 2023-05-22 NOTE — PLAN OF CARE
Problem: Plan of Care - These are the overarching goals to be used throughout the patient stay.    Goal: Plan of Care Review  Description: The Plan of Care Review/Shift note should be completed every shift.  The Outcome Evaluation is a brief statement about your assessment that the patient is improving, declining, or no change.  This information will be displayed automatically on your shift note.  Outcome: Adequate for Care Transition     Patient meets postpartum criteria to be discharged. Independent in her own cares. Educated on prescriptions, warning signs, and follow-up appointment. Breast pump purchased. Paperwork completed. All questions and concerns answered at this time.

## 2023-05-22 NOTE — PLAN OF CARE
VSS. Up ad renato. Breastfeeding and tolerating well, needing some assistance with breastfeeding, infant disinterested and refusing to suck at times, educated patient on importance of not going longer than 3 hours to breastfeed, as well as, placing infant skin to skin prior to breastfeeding, patient hand expressing and spoon feeding colostrum after breastfeeding, nipples smooth and nipple shield utilized with some improvement. Light amount of lochia, no clots noted. Tylenol and Ibuprofen for pain control. BS audible/active x4, tolerating PO, denies N/V. Voiding. Bonding well with infant, attentive to cares. Significant other at bedside and supportive. Continue with plan of care.

## 2023-05-22 NOTE — DISCHARGE INSTRUCTIONS
Warning Signs after Having a Baby    Keep this paper on your fridge or somewhere else where you can see it.    Call your provider if you have any of these symptoms up to 12 weeks after having your baby.    Thoughts of hurting yourself or your baby  Pain in your chest or trouble breathing  Severe headache not helped by pain medicine  Eyesight concerns (blurry vision, seeing spots or flashes of light, other changes to eyesight)  Fainting, shaking or other signs of a seizure    Call 9-1-1 if you feel that it is an emergency.     The symptoms below can happen to anyone after giving birth. They can be very serious. Call your provider if you have any of these warning signs.    My provider s phone number: _______________________    Losing too much blood (hemorrhage)    Call your provider if you soak through a pad in less than an hour or pass blood clots bigger than a golf ball. These may be signs that you are bleeding too much.    Blood clots in the legs or lungs    After you give birth, your body naturally clots its blood to help prevent blood loss. Sometimes this increased clotting can happen in other areas of the body, like the legs or lungs. This can block your blood flow and be very dangerous.     Call your provider if you:  Have a red, swollen spot on the back of your leg that is warm or painful when you touch it.   Are coughing up blood.     Infection    Call your provider if you have any of these symptoms:  Fever of 100.4 F (38 C) or higher.  Pain or redness around your stitches if you had an incision.   Any yellow, white, or green fluid coming from places where you had stitches or surgery.    Mood Problems (postpartum depression)    Many people feel sad or have mood changes after having a baby. But for some people, these mood swings are worse.     Call your provider right away if you feel so anxious or nervous that you can't care for yourself or your baby.    Preeclampsia (high blood pressure)    Even if you  didn't have high blood pressure when you were pregnant, you are at risk for the high blood pressure disease called preeclampsia. This risk can last up to 12 weeks after giving birth.     Call your provider if you have:   Pain on your right side under your rib cage  Sudden swelling in the hands and face    Remember: You know your body. If something doesn't feel right, get medical help.     For informational purposes only. Not to replace the advice of your health care provider. Copyright 2020 [facility]. All rights reserved. Clinically reviewed by Allison Pollock, RNC-OB, MSN. Diurnal 348750 - Rev 02/23.

## 2023-05-22 NOTE — PLAN OF CARE
Progressing well. Vital signs stable and afebrile. Voiding frequently and passing flatus. Pain managed with Ibuprofen and Tylenol (See MAR). Bonding well with baby and attentive to  cues. Breastfeeding baby every 2-3 hours. Hand expression utilized. Lactation consulted and home feeding plan established.     Plans for discharge tonight 23.

## 2023-05-22 NOTE — DISCHARGE SUMMARY
Vaginal Delivery Postpartum Day 1    Patient Name:  Laura Corral  :      1995  MRN:      [id number]      Subjective:  Laura Corral is feeling well and requesting 24 hour discharge home.  States she is breastfeeding fair, with infant not wanting to latch as well as after birth. Has seen lactation consultant today and is planning to start pumping and spoon feeding. 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 Reports voiding without difficulty and vaginal bleeding has decreased.  Denies passing any large clots.  Pain is well controlled with ibuprofen. Plans NFP for postpartum birth control.  Reports history of of INGE, currently stable on medication dose.        Objective:  /77 (BP Location: Left arm, Patient Position: Semi-Lopez's, Cuff Size: Adult Regular)   Pulse 75   Temp 98.2  F (36.8  C) (Oral)   Resp 17   LMP 2022   SpO2 98%   Breastfeeding Unknown     Breasts: soft, lactating   Abdomen:soft, non-tender, fundus firm @ 2 below U  Perineum: healing, mild edema, lochia small rubra   Extremities: +1 pedal edema bilaterally         Immunization History   Administered Date(s) Administered     DTAP (<7y) 1993, 1993, 1993, 1994, 1997, 1997     HEPA 2011     HEPATITIS A (PEDS 12M-18Y) 2011     HIB (PRP-T) 1993, 1993, 1993, 1994     HIB(PRP-OMP)(PedvaxHIB) 1993, 1993, 1993, 1994     HPV 2011     HPV Quadrivalent 2011, 2011     HepB 1995, 1995, 1995     Hepatitis A (ADULT 19+) 2011     Hepatits B (Peds <19Y) 1995, 1995, 1995     Historical DTP/aP 1993, 1993, 1993, 1994     Influenza Vaccine >6 months (Alfuria,Fluzone) 2014     MMR 1994, 1997     Meningococcal (Menomune ) 2011     Meningococcal ACWY (Menactra ) 2011     Poliovirus,  inactivated (IPV) 1993, 1993, 1993, 1994     TD,PF 7+ (Tenivac) 2005     TDAP (Adacel,Boostrix) 2011     Td (Adult), Adsorbed 2005       Assessment:    -Stable Postpartum Day 1   -Normal involution  -Breastfeeding, pumping   -History INGE  -Perineal edema       Plan:   -New mom information reviewed and all questions answered.  -Home visit not ordered, patient too far away for coverage  -Desires breast pump before discharge      -Discharge home today  -Follow up in CN clinic in 2 and 6 weeks  -Call with questions/concerns         Provider:  SIM Zavaleta CNM, WHNP     Date:  2023  Time:  11:41 AM    Patient Name:  Laura PUCKETT Ellis  :  1995  MRN:  [id number]

## 2023-06-05 NOTE — PROGRESS NOTES
"Laura presents to [facility] clinic for her 2 wk PP exam. Her  birth occurred on 23.  Baby: girl \"Elza\"  Feelings about her birth:\" it was amazing!\", hoping for unmedicated and WB but chose epidural. Had quite a bit of bleeding during with labor. Happy with her care. \"God-sent team, Delisa was fanastic!\"  Sleep: doing ok  Eating/appetite: normal, well hydrated  Mood:on sertaline 125 mg (1 1/2 yrs and stable),  EPDS never to # 10, 7 on INGE-7 score.  Urine/Bowel: normal/normal (took miralax and senna at first, now ok)  Bleeding: light period  Healing: doing well. (2nd degree lac repaired)  Help at home adequate: yes, . Friends and family. 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  Feeding baby: both breast and bottle, pumping is hard. Gave Lesly Leblanc info and pt will consider. Difficult latch and some issues with supply. No more SNS and doing well with bottle.   BCM: had elevated BP on CHANCE's, no plan yet. Has used NFP.  Has 15 wks off.    A: Normal 2 wk PP exam    P: RTC for 6 PP exam   Referral to IBCLC    "

## 2023-06-06 ENCOUNTER — OFFICE VISIT (OUTPATIENT)
Dept: MIDWIFE SERVICES | Facility: CLINIC | Age: 28
End: 2023-06-06
Payer: COMMERCIAL

## 2023-06-06 VITALS — DIASTOLIC BLOOD PRESSURE: 92 MMHG | BODY MASS INDEX: 35.52 KG/M2 | WEIGHT: 188 LBS | SYSTOLIC BLOOD PRESSURE: 136 MMHG

## 2023-06-06 PROCEDURE — 99024 POSTOP FOLLOW-UP VISIT: CPT | Performed by: MIDWIFE

## 2023-06-06 ASSESSMENT — ANXIETY QUESTIONNAIRES
5. BEING SO RESTLESS THAT IT IS HARD TO SIT STILL: NOT AT ALL
7. FEELING AFRAID AS IF SOMETHING AWFUL MIGHT HAPPEN: SEVERAL DAYS
IF YOU CHECKED OFF ANY PROBLEMS ON THIS QUESTIONNAIRE, HOW DIFFICULT HAVE THESE PROBLEMS MADE IT FOR YOU TO DO YOUR WORK, TAKE CARE OF THINGS AT HOME, OR GET ALONG WITH OTHER PEOPLE: NOT DIFFICULT AT ALL
2. NOT BEING ABLE TO STOP OR CONTROL WORRYING: SEVERAL DAYS
GAD7 TOTAL SCORE: 5
GAD7 TOTAL SCORE: 5
3. WORRYING TOO MUCH ABOUT DIFFERENT THINGS: SEVERAL DAYS
1. FEELING NERVOUS, ANXIOUS, OR ON EDGE: SEVERAL DAYS
6. BECOMING EASILY ANNOYED OR IRRITABLE: NOT AT ALL

## 2023-06-06 ASSESSMENT — PATIENT HEALTH QUESTIONNAIRE - PHQ9: 5. POOR APPETITE OR OVEREATING: SEVERAL DAYS

## 2023-06-20 ENCOUNTER — TELEPHONE (OUTPATIENT)
Dept: MIDWIFE SERVICES | Facility: CLINIC | Age: 28
End: 2023-06-20
Payer: COMMERCIAL

## 2023-06-20 NOTE — TELEPHONE ENCOUNTER
Received FMLA paperwork at [facility]. Completed forms faxed to number requested. Completed forms placed in basket near printer in [facility].

## 2023-07-10 ENCOUNTER — MEDICAL CORRESPONDENCE (OUTPATIENT)
Dept: HEALTH INFORMATION MANAGEMENT | Facility: CLINIC | Age: 28
End: 2023-07-10
Payer: COMMERCIAL

## 2023-07-17 DIAGNOSIS — F41.1 GAD (GENERALIZED ANXIETY DISORDER): ICD-10-CM

## 2023-07-18 RX ORDER — SERTRALINE HYDROCHLORIDE 100 MG/1
TABLET, FILM COATED ORAL
Qty: 90 TABLET | Refills: 2 | Status: SHIPPED | OUTPATIENT
Start: 2023-07-18 | End: 2023-08-16 | Stop reason: SINTOL

## 2023-07-18 RX ORDER — SERTRALINE HYDROCHLORIDE 25 MG/1
TABLET, FILM COATED ORAL
Qty: 90 TABLET | Refills: 2 | Status: SHIPPED | OUTPATIENT
Start: 2023-07-18 | End: 2023-08-16 | Stop reason: SINTOL

## 2023-07-18 NOTE — TELEPHONE ENCOUNTER
Prescription approved per [facility] Refill Protocol     Juliette Palma     RN MSN

## 2023-07-26 ENCOUNTER — PRENATAL OFFICE VISIT (OUTPATIENT)
Dept: MIDWIFE SERVICES | Facility: CLINIC | Age: 28
End: 2023-07-26
Payer: COMMERCIAL

## 2023-07-26 DIAGNOSIS — F41.1 GAD (GENERALIZED ANXIETY DISORDER): ICD-10-CM

## 2023-07-26 PROBLEM — O26.03 EXCESSIVE WEIGHT GAIN DURING PREGNANCY IN THIRD TRIMESTER: Status: RESOLVED | Noted: 2023-04-24 | Resolved: 2023-07-26

## 2023-07-26 PROBLEM — R03.0 ELEVATED BLOOD PRESSURE READING WITHOUT DIAGNOSIS OF HYPERTENSION: Status: RESOLVED | Noted: 2023-04-24 | Resolved: 2023-07-26

## 2023-07-26 PROBLEM — O99.013 ANEMIA DURING PREGNANCY IN THIRD TRIMESTER: Status: RESOLVED | Noted: 2023-04-24 | Resolved: 2023-07-26

## 2023-07-26 PROBLEM — Z34.03 ENCOUNTER FOR SUPERVISION OF NORMAL FIRST PREGNANCY IN THIRD TRIMESTER: Status: RESOLVED | Noted: 2023-04-24 | Resolved: 2023-07-26

## 2023-07-26 PROBLEM — Z37.9 NORMAL LABOR: Status: RESOLVED | Noted: 2023-05-21 | Resolved: 2023-07-26

## 2023-07-26 PROBLEM — O42.90 AMNIOTIC FLUID LEAKING: Status: RESOLVED | Noted: 2023-05-20 | Resolved: 2023-07-26

## 2023-07-26 PROCEDURE — 99213 OFFICE O/P EST LOW 20 MIN: CPT | Mod: 24 | Performed by: ADVANCED PRACTICE MIDWIFE

## 2023-07-26 PROCEDURE — 99207 PR POST PARTUM EXAM: CPT | Performed by: ADVANCED PRACTICE MIDWIFE

## 2023-07-26 ASSESSMENT — EDINBURGH POSTNATAL DEPRESSION SCALE (EPDS)
I HAVE LOOKED FORWARD WITH ENJOYMENT TO THINGS: AS MUCH AS I EVER DID
TOTAL SCORE: 8
I HAVE FELT SCARED OR PANICKY FOR NO GOOD REASON: YES, SOMETIMES
I HAVE BEEN ANXIOUS OR WORRIED FOR NO GOOD REASON: YES, SOMETIMES
I HAVE BLAMED MYSELF UNNECESSARILY WHEN THINGS WENT WRONG: YES, SOME OF THE TIME
THINGS HAVE BEEN GETTING ON TOP OF ME: NO, MOST OF THE TIME I HAVE COPED QUITE WELL
I HAVE BEEN ABLE TO LAUGH AND SEE THE FUNNY SIDE OF THINGS: AS MUCH AS I ALWAYS COULD
THE THOUGHT OF HARMING MYSELF HAS OCCURRED TO ME: NEVER
I HAVE FELT SAD OR MISERABLE: NOT VERY OFTEN
I HAVE BEEN SO UNHAPPY THAT I HAVE BEEN CRYING: NO, NEVER
I HAVE BEEN SO UNHAPPY THAT I HAVE HAD DIFFICULTY SLEEPING: NOT AT ALL

## 2023-07-26 ASSESSMENT — ANXIETY QUESTIONNAIRES
IF YOU CHECKED OFF ANY PROBLEMS ON THIS QUESTIONNAIRE, HOW DIFFICULT HAVE THESE PROBLEMS MADE IT FOR YOU TO DO YOUR WORK, TAKE CARE OF THINGS AT HOME, OR GET ALONG WITH OTHER PEOPLE: SOMEWHAT DIFFICULT
1. FEELING NERVOUS, ANXIOUS, OR ON EDGE: SEVERAL DAYS
GAD7 TOTAL SCORE: 13
GAD7 TOTAL SCORE: 13
7. FEELING AFRAID AS IF SOMETHING AWFUL MIGHT HAPPEN: MORE THAN HALF THE DAYS
4. TROUBLE RELAXING: MORE THAN HALF THE DAYS
2. NOT BEING ABLE TO STOP OR CONTROL WORRYING: MORE THAN HALF THE DAYS
6. BECOMING EASILY ANNOYED OR IRRITABLE: MORE THAN HALF THE DAYS
5. BEING SO RESTLESS THAT IT IS HARD TO SIT STILL: MORE THAN HALF THE DAYS
3. WORRYING TOO MUCH ABOUT DIFFERENT THINGS: MORE THAN HALF THE DAYS

## 2023-07-26 NOTE — PATIENT INSTRUCTIONS
"\"We hope you had a positive experience and that you can definitely recommend [facility] to your family and friends. You ll be receiving a survey soon, and we look forward to hearing your feedback\".    POSTPARTUM INFORMATION AND RESOURCES:     Congratulations on the birth of your baby. We have gathered together some information on staying healthy in the postpartum time including information on exercise, nutrition and mental health. We have also listed some local and national resources for lactation support and mental health support.     If you have questions or concerns and need to speak with a midwife immediately, please call the on-call midwife at [phone].     If you have a non-emergent question or would like to schedule a follow up appointment, please call the clinic midwife at [phone].     Thank you for sharing your birth experience with the [facility].  Congratulations on the birth of your baby!     ---------------------------------------------------------------------------------------------------------  EXERCISE & NUTRITION:      Getting and Staying Active: A Healthy You!   -Why should I exercise? Exercise, being physically active, is very important for all women. Being active can help you:   Lose or maintain weight   Have more energy   Sleep better   Enjoy sex more   Relieve stress and think better   Lower the chance you will have heart disease, high blood pressure, and diabetes   Strengthen your bones and muscles   Have fewer hot flashes if you are older   -How active do I have to be to get the bene?ts of exercise?  Studies show that as little as 15 minutes of moderate exercise--like fast walking or dancing--3 times a week can improve the health of your heart. Ifyou want to get the best benefits from exercise, try to increase your physical activity to at least 30 minutes, 5 times a week. If you have a serious health problem,be sure to talk " with your health care provider before starting an exercise program.   Https://[employer]/  Http://www.Brammo.com/     @[email or web address]  @[email or web address]  [email or web address].[email or web address]     Taking Care of your Health: Health Care Maintenance Screening recommendations   In all the things women do, taking care of everything and everybody else, they sometimes forget to take care of themselves. This handout reviews the health screenings and vaccines that are recommended for women of all ages. Talk with yourhealth care provider about which tests and vaccines you need. The chart below lists the screening tests and vaccinations recommended for healthy women who do not have a high risk for most diseases.   The recommendations in thischart are guidelines only. For some tests and vaccines, the chart says you should talk to your health care provider.This is because the best recommendations for you depend on your personal health history. Your health care provider may suggest more frequent testing or additional tests ifyou havea higher chance of getting some diseases      Planning Your Family: Developing a Reproductive Life Plan   Planning ahead can help you avoid getting pregnant when you don t want to be pregnant and also be in good health if and when you do decide to become pregnant. Many women have at least one pregnancy in their lives, even if it was not planned. In fact, about half of all pregnancies are not planned. Getting pregnant when you did not plan it can be a problem, or it can turn into a happy event. Planning pregnancy leads to healthier pregnancies, healthier mothers, and healthier families.   Although many women want to have a family, not everyone wants to have children. More and more women are childless by choice (also known as childfree). Whether to have children is a personal choice that only you can make. It s okay not to want children! If you never want to get pregnant, it is important to make sure you  "always use very effective birth control, such as an intrauterine device, the birth control implant, female sterilization (having your tubes tied), or your partner having a vasectomy.      Reliable resources:   ?   American College of Nurse-Midwives (ACNM) http://www.midwife.org/; look at the informational handouts at http://www.midwife.org/Share-With-Women   ??   Women's Health.gov:   http://www.womenshealth.gov/a-z-topics/index.html   FDA - Nutrition ?www.mypyramid.gov? Under \"For Consumers,\" click on \"pregnant and breastfeeding women.\"   ?   Vaccines : Centers for Disease Control and Prevention (CDC) http://www.cdc.gov/vaccines/   ?   [facility] www.YouCastr.ReVision [employer]rapeutics   ?   When researching information on the web, question the validity of websites.? The domains .gov, Digital Map Products and.org tend to be more reliable information.? If there are a lot of advertisements, be cautious of the information provided. Stay away from blogs and chat rooms please!   ?   ?   Nutrition and supplements:   Daily multivitamin vitamin (with 400 - 1000 mcg folic acid).? Take with food as needed.   ?   4-5 servings of dairy or other calcium rich foods (fish, leafy greens, soy)?per day - if not, take 500-1000 mg additional calcium (Tums, pills, chews). Take with dairy.   ?   Vitamin D3 4000 IU geltab daily. Vitamin D rich foods: Cod Liver Oil (1Tbsp), [place], Mackerel, Tuna, fortified milk and yogurt, Beef and liver, sardines, egg, fortified cereals, swiss cheese.? Take supplements with fattiest meal.?   ?   2-3 (4) oz servings of fish, seafood, nuts (walnuts & almonds), oils, avocado per week - if not, take Omega 3 Fatty acids: DHA & ROBERTO 8401-0739 mg per day. ?Other names: cod liver oil, fish oil. Take with fattiest meal.   ?   ?---------------------------------------------------------------------------------------------------------  POSTPARTUM & LACTATION RESOURCES :         Early Childhood Family Education [place] [address] provides a free, drop-in " "class/breastfeeding support group, facilitated by a lactation consultant and .  During the group you can connect with other parents, weigh your baby, ask questions about feeding and baby development, and more.  You do not need to register.    in-person meetings will begin on , are for parents of babies from birth to 9 months, and will meet on Monday evenings from 6 - 7:15 pm in [facility], which is at [address] [facility].  [place] [id number] also offers virtual group meetings with a lactation consultant/.  These take place on , from 11:30 am - 12:30 pm for parents of newborns to 3-month-olds, and from 4:15 - 5:15 for parents of 3 - 9 - month olds.  To get the meeting link contact Carmencita العراقي at [phone].    [facility] offers a free, drop-in breastfeeding support group facilitated by ANDRIY Oro.   at [place] [facility] [address], [place].  A scale is available to check baby weights, if desired.  [place] also has a variety of new parent classes:  (cost for registration)  https://HealthUnlocked/classes/    [facility] Lactation [facility] facilitated by ANDRIY Sanchez:  Free, drop-in support group for breastfeeding, with baby scale available if needed.  Meets at [facility], second Tuesday of each month, 10 am - 12 pm.  Text [phone] for info.    Lat Cafe Support Group,  at 10:30 am   Run by ANDRIY Novoa of [facility]   Go to [facility] Facebook page, click on \"events\" for link:   Https://www.facebook.com/events/[email or web address]/    [facility] [place] breastfeeding support community:  free, drop-in breastfeeding support facilitated by Certified Lactation Counselors, open  and  from 1 pm - 5 pm.  In [place]:  Guiding [facility], [address] [facility]:   guidingarSelect Medical Specialty Hospital - Boardman, Incnader.[facility]    Health " [facility] Milk Hour, Thursdays at 2:30 pm    Run by ANDRIY Nam  Go to [facility] FB page and send message to get link   Https://www.Recondo.iCurrent/healthdabanniu.comundations/    Laila Elaine:  http://[email or web address]/    [facility] offers a Lactation Lounge every Friday 12pm - 1pm, run by Laila Robertson Leader.  Sign up via link at Biophytis/cbe-lactation   https://www.Biophytis/cbe-lactation    [facility] is offering virtual support groups every Monday, 10:30 am - 12 pm, run by RN IBCLC: Https://www.Recondo.com/events/[email or web address]/    Culturally-Specific Breastfeeding Support:     For Hmong Families:   The Hmong Breastfeeding Coalition is a wonderful support for [place] Hmong women who are breastfeeding.  They are best found on Facebook.    for Black families:    Chocolate Milk Club:  http://www.[employer]ferent Pharmaceuticals/chocolate-milk-club/  Email: Leonard@Hubub    R.O.S.E. = Reaching our Sisters Everywhere  Http://www.breastfeedingrose.org/    Club Mom breastfeeding support for Black mothers:  Contact Jena Owusu  Phone: [phone]   Email:  [email or web address].     Kendra Mas  Phone: [phone]   Email:  [email or web address].    Club Dad parent support for Black fathers:   Contact Mp Olguin   Phone: [phone]   Email:  [email or web address].    For Native/Indigenous Families:    https://www.Recondo.com/groups/johnathan.gimashkikinaan        OUTPATIENT LACTATION RESOURCES   -Schedule an appointment with a [facility]wife who is also a Lactation Consultant by calling [phone].  Visits on Tuesdays, Wednesdays and Thursdays at multiple locations.  Call: [phone].       - Information on medication use while breastfeeding: http://toxnet.nlm.nih.gov/newtoxnet/lactmed.htm      Other Online Breastfeeding Resources:   BreastfeedingmadesSwoodooleFnboxcom  "  Esmerli.org (La Leche League)   Normalfed.com   Womenshealth.gov/breastfeeding   Workandpump.com   OpenQ.FlightOffice  https://Adjudica/abcs/   Click on \"Learn More About Attachment\"     -New Parent Connection Drop-In:  In collaboration with [place], Early Childhood Family Education (ECFE), a program of [address], offers ongoing classes for new parents in their infants.  The connection classes offer support and resources to parents during the exciting and challenging period of transition following the birth of her baby.  Parents and babies (birth to 6 months of age) may join the group at any time.  Baby's birth to 3 months meet , from 1230 to 1:30 PM  Babies 3-6 months meet , from 4 to 5 PM     -Go Try It On Mama Group: www.Fusionone [employer]/free-new-mama-group  -Attend [employer] New [employer]. Groups located at three locations:  [facility] and [place]. Sign up online.         Additional Resources:?   -American College of Nurse-Midwives (ACNM) http://www.midwife.org/; look at the informational handouts at http://www.midwife.org/Share-With-Women  www.mymidwife.org   ?   -Women's Health.gov:   http://www.womenshealth.gov/a-z-topics/index.html   ?   -Early Childhood and Family Education (ECFE):   ECFE offers parents hands-on learning experiences that will nourish a lifetime of teachable moments.   http://ecfe.info/ecfe-home/         -----------------------------------------------------------------------------------------------------------   (Before, during and after pregnancy)   MOOD DISORDER RESOURCES :  Are you feeling sad or depressed?   Do you feel more irritable or angry with those around you?   Are you having difficulty bonding with your baby?   Do you feel anxious or panicky?   Are you having problems with eating or sleeping?   Are you having upsetting thoughts that you can t get out of your mind?   Do you feel as if you are  out of control  or "  going crazy ?   Do you feel like you never should have become a mother?   Are you worried that you might hurt your baby or yourself?     Any of these symptoms, and many more, could indicate that you have a form of  mood or anxiety disorder, such as postpartum depression. While many women experience some mild mood changes during or after the birth of a child, 15 to 20% of women experience more significant symptoms of depression or anxiety. Please know that with informed care you can prevent a worsening of these symptoms and can fully recover. There is no reason to continue to suffer.  Women of every culture, age, income level and race can develop  mood and anxiety disorders. Symptoms can appear any time during pregnancy and the first 12 months after childbirth. There are effective and well-researched treatment options to help you recover. Although the term  postpartum depression  is most often used, there are actually several forms of illness that women may experience.     Resources:     -Pregnancy and Postpartum Support [place]: www.[facility].org  Who We Are: We are a group of mental health &  practitioners, service organizations, and mother volunteers who provides services to those struggling with a pregnancy, loss, or postpartum mood disorder through the Helpline, professional training, our resource list and website.  What We Do: We provide support, advocacy, awareness, and training about  mental health in [place].      Community Resource List:   This is a list of  resources within our community.   http://[facility] Cancer Centerupportmn.org/communityresourcelist    PSYCHOTHERAPISTS/CONSULTANTS   This is a list of licensed mental health professionals who have advanced clinical skills in the treatment of postpartum mood and anxiety disorders (PMADs).   Http://ppsupportmn.org/mentalhealthproviderresourcelist   INTEGRATIVE MEDICINE PRACTITIONERS:   This is a list of licensed  "providers who practice Integrative Medicine: a form of treatment that combines alternative medicine with evidence based medicine in an effort to treat the  whole person  (the person, not just the disease).   http://[facility].org/integrativemedicineproviders    PSYCHIATRIC CARE   This is a list of licensed Psychiatrists who have advanced clinical skills in the treatment and medication management for PMADs and lactating mothers.   Http://[facility].org/psychiatryproviderresroucelist   Click on the links below to find detailed profiles and contact information of providers who have been screened and approved as having advanced training in the area of PMADs. Please note: [facility] does not endorse a specific provider.   The list is in alphabetical order by city. You can also search for providers by city or [facility] code using the search box. Please click on the \"Show\" box to the upper right to advance to the next page. You may also call our Helpline at [phone] if you would like for our Helpline volunteer to find a provider for you.     -Postpartum Depression Support Group:   Weekly groups at no cost through [facility], , 1:30-3:00 p.m., at [facility], [address] [facility], [address]. [place], , 1:30-3:00 p.m., at [facility], [address]. To register for the group or get more information, call [phone].   -Postpartum Depression Support Group:   Outpatient Intensive Treatment program for women with  mood disorders [facility]     -[facility]  Resource Guide      Women s Mental Health at [facility]  This website provides a range of current information including discussion of new research findings in women s mental health and how such investigations inform day-to-day clinical practice. Despite the growing number of studies being conducted in " women s health, the clinical implications of such work are frequently controversial, leaving patients with questions regarding the most appropriate path to follow. Providing these resources to patients and their doctors so that individual clinical decisions can be made in a thoughtful and collaborative fashion dovetails with the mission of our Center.     https://[facility].org/        If you re having thoughts of harming yourself or your baby, it is vital to get support immediately. Call 911 or go to the nearest E.R.     TOLL-FREE / NATIONWIDE EMERGENCY ASSISTANCE   Immediate Emergency:  911   National Suicide Prevention Lifeline:   1-145.845.5718   Suicide Prevention Hotline:   7-997-OEHCCTK   [place] Postpartum Depression Hotline:   1-639-PPD-MOMS   Postpartum Support International (PSI)   PPD Helpline: (not a 24-hour hotline)   1-930.440.6358

## 2023-07-26 NOTE — PROGRESS NOTES
"ROUTINE POSTPARTUM VISIT    SUBJECTIVE:      Laura is a 28 year old year old female who presents to clinic today for a postpartum visit.  She is currently 9 weeks postpartum of a normal spontaneous vaginal birth with repaired second degree perineal laceration.  The birth happened on 23.  The baby was 38.0 weeks gestation at the time of the birth.  She attends the visit today with her mother and her cousin.    Laura's postpartum course has been unremarkable, gone \"better than I thought\".  Baby Elza's  course has been unremarkable.     Feelings about how her birth went:  Good!  \"Didn't go as planned but that was pretty much a given, so that was fine\".  Very happy with midwife Nancy and nurse Kylie.  Bleeding Status:  Bled for 6 weeks.  Has had one normal menses since birth.  Currently has no bleeding.  Sleeping Status:  \"I'm sleeping better now than when I was pregnant\"  Eating:  Good  Bowel/Bladder Function:  Bowel and Bladder function is normal.  Exercise:  Trying to get more walking in.  [place]:  Has not resumed intercourse.  Pain:  None.  Contraceptive Plans:  Planned contraception method is \"we'll see what happens\".   Has used FABMs in past.  Baby's Feeding Method:  Baby is feeding by breast and formula as planned.    Mental Health/Mood Status:  Feeling overall well, would like to increase zoloft dose.  [place]  Depression Scale score of 6/30, 0 to Q#10 today.  Notes anxiety increased since giving birth, current zoloft dose 125mg  Would like dose increased today.  Wanted to reach out to PCP- but they are gone for one month.  Amenable to increasing to 150 mg daily.  INGE-7 of  \"somewhat difficult\" today.  Work Plans:  Will be returning to work , great difficulties with FMLA forms.  Today would like a letter stating had six week appointment and when will be medically cleared to go to work.  States work wants her to ask to be medically cleared to go back " "this coming Monday, \"and then any time after that would be for bonding with baby\".    Review of Systems  -A 12 point comprehensive review of systems was negative except as noted above.  -Prenatal history and intrapartum course were also reviewed today.      OBJECTIVE:      There were no vitals filed for this visit, as patient wanted them done at the end of the visit and left before they were done.    Physical Exam:    General Appearance: Pleasant, articulate, well-groomed, well-nourished female.  Not in any apparent distress.    Heart: RRR  Lungs: Clear throughout  Neck: Supple, symmetrical, no adenopathy.  Thyroid:  Not enlarged, symmetric, no tenderness/mass/nodules  Back: no CVA tenderness  Breasts: Deferred s/t breast feeding.  Discussed SBE and s/s of breast cancer and encouraged to call with any concerns.  Abdomen: Soft, non-tender, no masses, diastasis 0 finger breadths.  Pelvic:  -External genitalia:  Normal hair distribution, no lesion.  Birth laceration site well healed.    -Urethral opening: Without lesions, or tenderness.   -Bladder: Without masses, or tenderness.  -Vagina: Pink, rugated, normal-appearing discharge.  -Cervix: parous, smooth, no lesions.  Pap smear not obtained. Due January 2024.  -Bimanual: Finds uterus small, mobile, nontender, no masses.  Negative CMT.  Adnexa, without masses or tenderness.  Excellent pelvic floor muscle strength with kegel.  Extremities: Extremities no edema, no varicosities.      ASSESSMENT/PLAN:     1)  Normal postpartum visit  2)  Postpartum Anxiety, needing medication adjustment.  Given new Rx today for zoloft 150 mg daily.  Discussed this is a higher dose of zoloft, and encouraged to consider alternative medication if increased dose does not help alleviate symptoms.    3)  Contraceptive plans include: FABM and open to pregnancy.  Educated about this method, advised continued PNV while any chance of pregnancy, Laura states this is her plan.    4)  General " postpartum education completed including:  Self-care of physical and emotional needs in this new postpartum period,  exercises to strengthen abdominal muscles and pelvic floor, return of fertility, resumption of intercourse, discussion of general health maintenance, sleep needs, required support of family/friends/community, and watching for signs/symptoms of  mood and anxiety disorders.  5)  Given letter for work stating postpartum visit today, and did not give medical clearance to return to work.  Laura needs to be mentally recovered from childbirth and stable postpartum prior to return to work, she is in agreement with this plan of care.  6.) RTC 2 weeks for medication and anxiety check.      Time spent:  Chart review/Pre-chartin min day of service  Face-to-face visit: 39 min counseling and education  Documentation: 6 min   Total time spent on day of service: 50 min      SIM Joyce CNM   2023  12:01 PM

## 2023-08-09 ENCOUNTER — PRENATAL OFFICE VISIT (OUTPATIENT)
Dept: MIDWIFE SERVICES | Facility: CLINIC | Age: 28
End: 2023-08-09
Payer: COMMERCIAL

## 2023-08-09 VITALS — HEART RATE: 66 BPM | OXYGEN SATURATION: 99 % | SYSTOLIC BLOOD PRESSURE: 128 MMHG | DIASTOLIC BLOOD PRESSURE: 78 MMHG

## 2023-08-09 DIAGNOSIS — F41.1 GAD (GENERALIZED ANXIETY DISORDER): Primary | ICD-10-CM

## 2023-08-09 PROCEDURE — 99215 OFFICE O/P EST HI 40 MIN: CPT | Performed by: ADVANCED PRACTICE MIDWIFE

## 2023-08-09 RX ORDER — ESCITALOPRAM OXALATE 10 MG/1
10 TABLET ORAL DAILY
Qty: 30 TABLET | Refills: 0 | Status: SHIPPED | OUTPATIENT
Start: 2023-08-09 | End: 2023-09-14

## 2023-08-09 ASSESSMENT — EDINBURGH POSTNATAL DEPRESSION SCALE (EPDS)
I HAVE FELT SCARED OR PANICKY FOR NO GOOD REASON: YES, QUITE A LOT
I HAVE BEEN SO UNHAPPY THAT I HAVE BEEN CRYING: NO, NEVER
THINGS HAVE BEEN GETTING ON TOP OF ME: NO, MOST OF THE TIME I HAVE COPED QUITE WELL
I HAVE LOOKED FORWARD WITH ENJOYMENT TO THINGS: AS MUCH AS I EVER DID
TOTAL SCORE: 9
I HAVE BLAMED MYSELF UNNECESSARILY WHEN THINGS WENT WRONG: YES, SOME OF THE TIME
I HAVE BEEN ABLE TO LAUGH AND SEE THE FUNNY SIDE OF THINGS: AS MUCH AS I ALWAYS COULD
THE THOUGHT OF HARMING MYSELF HAS OCCURRED TO ME: NEVER
I HAVE BEEN SO UNHAPPY THAT I HAVE HAD DIFFICULTY SLEEPING: NOT AT ALL
I HAVE FELT SAD OR MISERABLE: NO, NOT AT ALL
I HAVE BEEN ANXIOUS OR WORRIED FOR NO GOOD REASON: YES, VERY OFTEN

## 2023-08-09 ASSESSMENT — ANXIETY QUESTIONNAIRES
GAD7 TOTAL SCORE: 19
2. NOT BEING ABLE TO STOP OR CONTROL WORRYING: NEARLY EVERY DAY
GAD7 TOTAL SCORE: 19
7. FEELING AFRAID AS IF SOMETHING AWFUL MIGHT HAPPEN: NEARLY EVERY DAY
3. WORRYING TOO MUCH ABOUT DIFFERENT THINGS: NEARLY EVERY DAY
6. BECOMING EASILY ANNOYED OR IRRITABLE: SEVERAL DAYS
5. BEING SO RESTLESS THAT IT IS HARD TO SIT STILL: NEARLY EVERY DAY
IF YOU CHECKED OFF ANY PROBLEMS ON THIS QUESTIONNAIRE, HOW DIFFICULT HAVE THESE PROBLEMS MADE IT FOR YOU TO DO YOUR WORK, TAKE CARE OF THINGS AT HOME, OR GET ALONG WITH OTHER PEOPLE: SOMEWHAT DIFFICULT
4. TROUBLE RELAXING: NEARLY EVERY DAY
1. FEELING NERVOUS, ANXIOUS, OR ON EDGE: NEARLY EVERY DAY

## 2023-08-09 NOTE — PROGRESS NOTES
"POSTPARTUM PROBLEM VISIT FOR ANXIETY SYMPTOMS      SUBJECTIVE:     Laura Corral is a 28 year old female who presents for evaluation and treatment of postpartum depression/ severe anxiety symptoms.     Onset approximately one week after birth, last visit 23, see notes.  Laura states she has felt the same since that visit, zoloft dose increased to 150 mg per day without effect.    Current symptoms include: \"Feel like cup is always just about to fall off the edge of the table, impending doom\"  Current treatment for depression: zoloft 150 mg daily po.  No therapy.  Sleep problems: Not sleeping well, night is height of anxiety  Early awakening: if able to sleep \"yes definitely\"  Energy: \"okay\"  Motivation: \"low\"  Concentration: \"So so\"  Rumination/worrying: severe  Memory: \"terrible right now\"  Tearfulness: good, no problems  Anxiety: severe, see INGE-7 score  Panic: negative overall, although admits \"was close\" one time  Overall Mood: Anxious, otherwise positive   Interest Level: little less  Guilty Feelings: high  Energy Level: low  Concentration Ability: low  Appetite: little/ low  Pleasure Ability: great \"I have good days I'm just very anxious\"  Hopelessness: no  Suicidal ideation: no  Other/Psychosocial Stressors: none  Family history positive for depression: sister, grandparents  Previous treatment modalities employed include N/A.   Past episodes of depression: none  Organic causes of anxiety/depression present: childbirth with subsequent hormonal shift,  needs, otherwise none.    Review of Systems  Pertinent items are noted in HPI.       OBJECTIVE:     /78 (BP Location: Right arm, Patient Position: Sitting, Cuff Size: Adult Regular)   Pulse 66   SpO2 99%     Mental Status Examination:  Posture and motor behavior: holding self with arms, animated face, engaging with others and positively attentive and smiling with , (although mother is providing care for ).  Dress, " grooming, personal hygiene: good  Facial expression: normal, although more animated  Speech: intact  Mood: anxious appearing  Coherency and relevance of thought: normal  Thought content: anxious  Perceptions: normal, accurate and self aware, denies hallucinations  Orientation: A&O x 3, normal  Attention and concentration: similar to ADD  Memory: challenging per patient, minimal ability to assess in visit  Information: Appropriate   Vocabulary: excellent  Abstract reasoning:  intact  Judgment: appears intact      Inge-7 ([employer],2002; Used With Permission)    8/9/2023 10:16 AM CDT - Filed by Patient   Over the last two weeks, how often have you been bothered by the following problems?    1. Feeling nervous, anxious, or on edge Nearly every day   2. Not being able to stop or control worrying Nearly every day   3. Worrying too much about different things Nearly every day   4. Trouble relaxing Nearly every day   5. Being so restless that it is hard to sit still Nearly every day   6. Becoming easily annoyed or irritable Several days   7. Feeling afraid, as if something awful might happen Nearly every day   If you checked off any problems on this questionnaire, how difficult have these problems made it for you to do your work, take care of things at home, or get along with other people? Somewhat difficult   INGE 7 TOTAL SCORE (range: 0 - 21) 19 (severe anxiety)     [place] Pp Depression Scale    8/9/2023 10:18 AM CDT - Filed by Patient   I have been able to laugh and see the funny side of things. As much as I always could   I have looked forward with enjoyment to things. As much as I ever did   I have blamed myself unnecessarily when things went wrong. Yes, some of the time   I have been anxious or worried for no good reason. Yes, very often   I have felt scared or panicky for no good reason. Yes, quite a lot   Things have been getting on top of me. No, most of the time I have coped quite well   I have been so unhappy  that I have had difficulty sleeping. Not at all   I have felt sad or miserable. No, not at all   I have been so unhappy that I have been crying. No, never   The thought of harming myself has occurred to me. Never     Welcome File Ready To Room Event    2023 10:18 AM CDT - Filed by Patient   Press 'Submit' to complete your questionnaires. Submit     Medication Currently taking Zoloft 150 mg PO daily.  On medication, reports no effect.  Open to changing medications.  Has used Buspirone in the past with undesirable side effects.        ASSESSMENT:     Severe postpartum anxiety    Suicide Risk Assessment: Safe      PLAN:     1.  Medication Change: Given new prescription for Lexapro 10 mg po daily, will start tonight.  Discussed weaning from Zoloft, has already taken todays' dose, so tomorrow and Friday, will take 100 mg orally, Saturday and  will take 50 mg orally, and will take 25 mg Monday and Tuesday as desired.      2. Therapy:  Encouraged mental health therapy in addition to medication.  Counseling discussed and strongly encouraged, particularly in next month with medication changes, and also to foster positive results of medication management.  Resources offered for counselors specializing in postpartum depression/anxiety.  Encouraged to call and make an appointment ASAP.    3. Warning Signs/ Concerns:  Instructed patient to let people know and call or seek evaluation right away with any signs of postpartum psychosis (e.g. hallucinations).  Encouraged these can go away with treatment and is typically situational to the postpartum timeframe and not lifelong.    4. Follow-Up:  Due to current severe nature of anxiety, advised Return to Clinic in 1 week to monitor symptoms and effect of treatments, sooner as needed.      Time spent:  Chart review/Pre-chartin min day of service  Face-to-face visit: 21 min counseling and education  Documentation: 20 min  Total time spent on day of service: 41 min    Marcia  SIM Cervantes CNM   8/9/2023  1:07 PM

## 2023-08-09 NOTE — PATIENT INSTRUCTIONS
Zoloft:  For Thursday and Friday, take 100 mg orally  For Saturday and Sunday, take 50 mg orally  If you like, (noticing withdrawal symptoms), can take 25 mg Monday and Tuesday.    Start taking Lexapro today August 9th.    Please check in again in 1-2 weeks.

## 2023-08-16 ENCOUNTER — OFFICE VISIT (OUTPATIENT)
Dept: MIDWIFE SERVICES | Facility: CLINIC | Age: 28
End: 2023-08-16
Payer: COMMERCIAL

## 2023-08-16 VITALS — HEART RATE: 78 BPM

## 2023-08-16 DIAGNOSIS — F41.1 GAD (GENERALIZED ANXIETY DISORDER): Primary | ICD-10-CM

## 2023-08-16 PROCEDURE — 99213 OFFICE O/P EST LOW 20 MIN: CPT | Performed by: ADVANCED PRACTICE MIDWIFE

## 2023-08-16 RX ORDER — ESCITALOPRAM OXALATE 20 MG/1
20 TABLET ORAL DAILY
Qty: 30 TABLET | Refills: 0 | Status: SHIPPED | OUTPATIENT
Start: 2023-08-16 | End: 2023-10-04

## 2023-08-16 ASSESSMENT — PATIENT HEALTH QUESTIONNAIRE - PHQ9
SUM OF ALL RESPONSES TO PHQ QUESTIONS 1-9: 10
SUM OF ALL RESPONSES TO PHQ QUESTIONS 1-9: 10
10. IF YOU CHECKED OFF ANY PROBLEMS, HOW DIFFICULT HAVE THESE PROBLEMS MADE IT FOR YOU TO DO YOUR WORK, TAKE CARE OF THINGS AT HOME, OR GET ALONG WITH OTHER PEOPLE: SOMEWHAT DIFFICULT

## 2023-08-16 ASSESSMENT — EDINBURGH POSTNATAL DEPRESSION SCALE (EPDS)
I HAVE BEEN ANXIOUS OR WORRIED FOR NO GOOD REASON: YES, VERY OFTEN
I HAVE BEEN ABLE TO LAUGH AND SEE THE FUNNY SIDE OF THINGS: AS MUCH AS I ALWAYS COULD
I HAVE FELT SAD OR MISERABLE: NOT VERY OFTEN
I HAVE LOOKED FORWARD WITH ENJOYMENT TO THINGS: AS MUCH AS I EVER DID
THINGS HAVE BEEN GETTING ON TOP OF ME: YES, SOMETIMES I HAVEN'T BEEN COPING AS WELL AS USUAL
I HAVE BEEN SO UNHAPPY THAT I HAVE BEEN CRYING: NO, NEVER
TOTAL SCORE: 12
I HAVE FELT SCARED OR PANICKY FOR NO GOOD REASON: YES, QUITE A LOT
I HAVE BEEN SO UNHAPPY THAT I HAVE HAD DIFFICULTY SLEEPING: NOT VERY OFTEN
THE THOUGHT OF HARMING MYSELF HAS OCCURRED TO ME: NEVER
I HAVE BLAMED MYSELF UNNECESSARILY WHEN THINGS WENT WRONG: YES, SOME OF THE TIME

## 2023-08-16 ASSESSMENT — ANXIETY QUESTIONNAIRES
4. TROUBLE RELAXING: MORE THAN HALF THE DAYS
GAD7 TOTAL SCORE: 17
6. BECOMING EASILY ANNOYED OR IRRITABLE: SEVERAL DAYS
3. WORRYING TOO MUCH ABOUT DIFFERENT THINGS: NEARLY EVERY DAY
2. NOT BEING ABLE TO STOP OR CONTROL WORRYING: NEARLY EVERY DAY
5. BEING SO RESTLESS THAT IT IS HARD TO SIT STILL: MORE THAN HALF THE DAYS
1. FEELING NERVOUS, ANXIOUS, OR ON EDGE: NEARLY EVERY DAY
7. FEELING AFRAID AS IF SOMETHING AWFUL MIGHT HAPPEN: NEARLY EVERY DAY

## 2023-08-16 NOTE — PATIENT INSTRUCTIONS
Marcia's Advice:    Take 20 mg Lexapro each evening before bedtime.  Make an appointment with a therapist ASAP.  Encourage gene testing for anti-anxiety medications  ER evaluation with any hallucinations or concerns for psychosis.  If symptoms worsen, please call and be seen sooner.

## 2023-08-16 NOTE — PROGRESS NOTES
"POSTPARTUM PROBLEM VISIT FOR ANXIETY SYMPTOMS        SUBJECTIVE:      Laura Corral is a 28 year old female who presents for evaluation and treatment of postpartum depression/ severe anxiety symptoms.      Onset approximately one week after birth, last visit 23, see notes.  Laura states she has felt more anxiety since that visit, notes she feels sleepy within half an hour after taking Lexapro in the morning, \"after that my mind starts racing\".  Weaned from Zoloft and no longer taking this medication.  Later in visit agrees anxiety slightly improved since last visit, although essentially unchanged.     Current symptoms include: Still \"Feeling like cup is always just about to fall off the edge of the table, impending doom\"  Current treatment for anxiety: Lexapro 10 mg PO daily.  Looking into online options for therapy.  Sister is doing online therapy and recommending one that specializes in postpartum anxiety/depression.  Laura considering doing gene testing for effectiveness of anti-anxiety medications.  Sleep problems: Not sleeping well, night is height of anxiety  Early awakening: if able to sleep \"yes definitely\"  Energy: less than last visit, had stressful family from out of town this past week. (5 children under the age of 3).  Motivation: \"so-so\"  Concentration: \"So-so\"  Rumination/worrying: severe  Memory: \"I feel like that's been better this week\"  Tearfulness: good, no problems  Anxiety: severe, see INGE-7 score- slightly improved since last week  Panic: none since last visit  Overall Mood: Anxious, otherwise positive   Interest Level: \"it's there when it wants to be\"  Guilty Feelings: high  Energy Level: \"so-so\", \"great in the morning!\"  Concentration Ability: \"so-so\"  Appetite: \"one extreme or the other\"  Pleasure Ability: \"things make me happy\", finds nithya in  daughter  Hopelessness: \"not so much\"  Suicidal ideation: none  Other/Psychosocial Stressors: none  Family history " positive for depression: sister, grandparents  Previous treatment modalities employed include N/A.   Past episodes of depression: none  Organic causes of anxiety/depression present: childbirth with subsequent hormonal shift,  needs, otherwise none.     Review of Systems  Pertinent items are noted in HPI.         OBJECTIVE:     Pulse 78      Mental Status Examination:  Posture and motor behavior: holding  daughter, engaged with her, attentive to needs, animated face.  Dress, grooming, personal hygiene: good  Facial expression: normal  Speech: intact  Mood: anxious appearing, although improved from last week  Coherency and relevance of thought: normal  Thought content: anxious  Perceptions: normal, accurate and self aware, denies hallucinations  Orientation: A&O x 3, normal  Attention and concentration: improved from last week, however had added INGE-7 score herself as 12+4+1 = 13 (corrected by this writer to a total of 17)  Memory: intact  Information: Appropriate   Vocabulary: excellent  Abstract reasoning:  intact  Judgment: appears intact        Phq-9 (Phq-9)-Developed By Torsten Moody,Estefania Palmer,Иван Almanza [employer]agues,With An Educational Jamal From [employer] .    2023  3:47 PM CDT - Filed by Patient   Over the last 2 weeks, how often have you been bothered by any of the following problems?    1. Little interest or pleasure in doing things Not at all   2.  Feeling down, depressed, or hopeless Not at all   3.  Trouble falling or staying asleep, or sleeping too much More than half the days   4.  Feeling tired or having little energy More than half the days   5.  Poor appetite or overeating Several days   6.  Feeling bad about yourself - or that you are a failure or have let yourself or your family down More than half the days   7.  Trouble concentrating on things, such as reading the newspaper or watching television Several days   8.  Moving or speaking so slowly that  other people could have noticed. Or the opposite - being so fidgety or restless that you have been moving around a lot more than usual More than half the days   9.  Thoughts that you would be better off dead, or of hurting yourself in some way Not at all   If you checked off any problems, how difficult have these problems made it for you to do your work, take care of things at home, or get along with other people? Somewhat difficult   PHQ9 TOTAL SCORE (range: 0 - 27) 10 (Moderate depression)   Answers submitted by the patient for this visit:  Patient Health Questionnaire (Submitted on 8/16/2023)  If you checked off any problems, how difficult have these problems made it for you to do your work, take care of things at home, or get along with other people?: Somewhat difficult  PHQ9 TOTAL SCORE: 10    Phq2 (   1999 [employer],All Rights Reserved. Used With Permission. Developed By Torsten Moody,Estefania Palmer,Иван Alman[employer]agues,With An Educational Jamal From [employer].)    8/16/2023  3:47 PM CDT - Filed by Patient   Q1: Little interest or pleasure in doing things Not at all   Q2: Feeling down, depressed or hopeless Not at all   PHQ-2 Score (range: 0 - 6) 0     [place] Pp Depression Scale    8/16/2023  3:48 PM CDT - Filed by Patient   I have been able to laugh and see the funny side of things. As much as I always could   I have looked forward with enjoyment to things. As much as I ever did   I have blamed myself unnecessarily when things went wrong. Yes, some of the time   I have been anxious or worried for no good reason. Yes, very often   I have felt scared or panicky for no good reason. Yes, quite a lot   Things have been getting on top of me. Yes, sometimes I haven't been coping as well as usual   I have been so unhappy that I have had difficulty sleeping. Not very often   I have felt sad or miserable. Not very often   I have been so unhappy that I have been crying. No, never   The thought  "of harming myself has occurred to me. Never     Welcome File Ready To Room Event    2023  3:48 PM CDT - Filed by Patient   Press 'Submit' to complete your questionnaires. Submit     INGE-7 = 17, \"somewhat difficult\", last week was 19 \"somewhat difficult\"          ASSESSMENT:      Severe postpartum anxiety     Suicide Risk Assessment: Safe        PLAN:      1.  Medication Change: Adjust Lexapro to 20 mg po daily, change to taking in the evening.       2. Therapy:  Continue to strongly encourage mental health therapy in addition to medication.  Discussed fostering positive results and better outcomes than with medication management alone.  Encouraged to call and make an appointment before next appointment with this writer in 2 weeks time.     3. Warning Signs/ Concerns:  Instructed patient to let people know and call or seek evaluation right away with any signs of postpartum psychosis (e.g. hallucinations), HI/SI.  Encouraged these can go away with treatment and is typically situational to the postpartum timeframe and not lifelong.     4. Follow-Up:  Due to continued severe nature of anxiety, advised Return to Clinic in 2 weeks to monitor symptoms and effect of treatments, sooner as needed.        Time spent:  Chart review/Pre-chartin min day of service  Face-to-face visit: 20 min counseling and education  Documentation: 5 min  Total time spent on day of service: 25 min     SIM Joyce CNM   2023  4:26 PM        "

## 2023-08-17 ASSESSMENT — ANXIETY QUESTIONNAIRES: GAD7 TOTAL SCORE: 17

## 2023-09-06 ENCOUNTER — OFFICE VISIT (OUTPATIENT)
Dept: MIDWIFE SERVICES | Facility: CLINIC | Age: 28
End: 2023-09-06
Payer: COMMERCIAL

## 2023-09-06 VITALS
HEART RATE: 60 BPM | DIASTOLIC BLOOD PRESSURE: 82 MMHG | BODY MASS INDEX: 34.2 KG/M2 | SYSTOLIC BLOOD PRESSURE: 128 MMHG | WEIGHT: 181 LBS

## 2023-09-06 DIAGNOSIS — F41.8 POSTPARTUM ANXIETY: Primary | ICD-10-CM

## 2023-09-06 PROCEDURE — 99213 OFFICE O/P EST LOW 20 MIN: CPT | Performed by: ADVANCED PRACTICE MIDWIFE

## 2023-09-06 ASSESSMENT — ANXIETY QUESTIONNAIRES
2. NOT BEING ABLE TO STOP OR CONTROL WORRYING: SEVERAL DAYS
5. BEING SO RESTLESS THAT IT IS HARD TO SIT STILL: NOT AT ALL
GAD7 TOTAL SCORE: 4
GAD7 TOTAL SCORE: 4
7. FEELING AFRAID AS IF SOMETHING AWFUL MIGHT HAPPEN: SEVERAL DAYS
3. WORRYING TOO MUCH ABOUT DIFFERENT THINGS: NOT AT ALL
IF YOU CHECKED OFF ANY PROBLEMS ON THIS QUESTIONNAIRE, HOW DIFFICULT HAVE THESE PROBLEMS MADE IT FOR YOU TO DO YOUR WORK, TAKE CARE OF THINGS AT HOME, OR GET ALONG WITH OTHER PEOPLE: SOMEWHAT DIFFICULT
6. BECOMING EASILY ANNOYED OR IRRITABLE: NOT AT ALL
1. FEELING NERVOUS, ANXIOUS, OR ON EDGE: SEVERAL DAYS

## 2023-09-06 ASSESSMENT — PATIENT HEALTH QUESTIONNAIRE - PHQ9: 5. POOR APPETITE OR OVEREATING: SEVERAL DAYS

## 2023-09-06 NOTE — PROGRESS NOTES
"POSTPARTUM DEPRESSION/ANXIETY FOLLOW UP PROBLEM VISIT      SUBJECTIVE:     Laura Corral is an 28 year old female who presents for follow up evaluation and treatment of depressive symptoms.     Onset approximately one week after birth, last visit 23, see notes.    Feeling much improved since that time!    MEDICATION: Lexapro 20 mg daily, working well!  THERAPY: Has not scheduled, preferred provider not taking patients at this time, still has handouts we gave for  mood disorders  GENETIC TESTING: Primary MD appointment scheduled end  for genetic testing r/t psych medications.    Current symptoms include: some underlying anxiety, much improved!  Current treatment for depression: Lexapro 20 mg daily  Sleep problems: \"Pretty okay actually\"    Early awakening: \"Pretty good actually, I'm waking up when she wakes up\"    Energy: \"Better\"  Motivation: \"Better\"  Concentration: \"Better\"  Rumination/worrying: \"Better\"  Memory: \"Good, my recall is still a little fuzzy, but I don't know if that's new mom\"  Tearfulness: \"Good, but that's always been good\"   Anxiety: \"Better overall\", notes some baseline anxiety all the time.  \"That's always been a normal\"   Panic: None   Overall Mood: Improved   Hopelessness: None  Suicidal ideation: Never   Other/Psychosocial Stressors: Moving, selling a house (will put on the market next week).  Family history positive for depression: \"everyone except for my mom\".   Previous treatment modalities employed include zoloft during pregnancy, ineffective postpartum.   Past episodes of depression: None  Organic causes of depression present: none.    Interest Level: normal   Guilty Feelings: \"normal mom stuff\"  Appetite: \"good\"  Pleasure Ability: Normal     Review of Systems  Pertinent items are noted in HPI.       OBJECTIVE:     /82 (BP Location: Right arm, Patient Position: Sitting, Cuff Size: Adult Regular)   Pulse 60   Wt 82.1 kg (181 lb)   LMP " "08/06/2023 (Exact Date)   Breastfeeding Yes   BMI 34.20 kg/m      Mental Status Examination:  Posture and motor behavior: good posture, normal movements    Dress, grooming, personal hygiene: well groomed  Facial expression: calm, good eye contact, smiling, interactive,   Speech: normal  Mood: normal  Coherency and relevance of thought: excellent  Thought content: normal  Perceptions: accurate  Orientation: x 3  Attention and concentration: good  Memory: intact  Information: normal  Vocabulary: excellent  Abstract reasoning: good  Judgment: intact      EPDS today = 4/30, 0 to #10.   INGE-7/21 = 4, \"somewhat difficult\". Both notably improved.  Last visit PHQ-9 = 10/27, INGE-7 = 17/21 \"somewhat difficult\"      ASSESSMENT:     Postpartum anxiety, well controlled with current medication and dose      PLAN:     Continue to encourage mental health counseling therapy.  Continue current medication and dosing for anxiety: Lexapro 20 mg daily  May return to work at this time, note given for work.  Follow up with PCP MD as planned and desired for genetic testing in relation to medications and which may be most effective for her.  Otherwise follow up with CNMs in one year for annual exam, sooner PRN.  Verbalizes understanding of need to be seen within past 12 months for continued prescriptions.      Total time spent on the date of this encounter doing: chart review, review of test results, patient visit, the physical exam, education, counseling, developing this plan of care, and documenting = 25 minutes.    SIM Joyce CNM   9/6/2023 6:30 AM   "

## 2023-09-14 DIAGNOSIS — F41.1 GAD (GENERALIZED ANXIETY DISORDER): ICD-10-CM

## 2023-09-14 RX ORDER — ESCITALOPRAM OXALATE 10 MG/1
10 TABLET ORAL DAILY
Qty: 30 TABLET | Refills: 0 | Status: SHIPPED | OUTPATIENT
Start: 2023-09-14 | End: 2023-10-04

## 2023-09-14 NOTE — TELEPHONE ENCOUNTER
Incoming refill request from [facility] in [place], [place]. Medication requested is:    Pending Prescriptions:                       Disp   Refills    escitalopram (LEXAPRO) 10 MG tablet [Phar*30 tab*0            Sig: TAKE 1 TABLET(10 MG) BY MOUTH DAILY    Medication last prescribed on 08-. Last visit with this provider group 09-. Refill request sent to on call CNM for review.

## 2023-10-04 ENCOUNTER — MYC MEDICAL ADVICE (OUTPATIENT)
Dept: MIDWIFE SERVICES | Facility: CLINIC | Age: 28
End: 2023-10-04
Payer: COMMERCIAL

## 2023-10-04 DIAGNOSIS — F41.1 GAD (GENERALIZED ANXIETY DISORDER): ICD-10-CM

## 2023-10-04 RX ORDER — ESCITALOPRAM OXALATE 20 MG/1
20 TABLET ORAL DAILY
Qty: 30 TABLET | Refills: 11 | Status: SHIPPED | OUTPATIENT
Start: 2023-10-04 | End: 2024-01-30 | Stop reason: ALTCHOICE

## 2023-10-04 NOTE — TELEPHONE ENCOUNTER
Refill request: review of chart, seen 9/6/2023 for anxiety management visit and found stable at 20 mg Lexapro daily with plan to continue at this dose. No refills orders placed at that time. Completed refill for 20 mg lexapro daily, #30 tabs, 11 refills. Plan for annual reassessment and labs.

## 2023-11-14 ENCOUNTER — VIRTUAL VISIT (OUTPATIENT)
Dept: FAMILY MEDICINE | Facility: CLINIC | Age: 28
End: 2023-11-14
Payer: COMMERCIAL

## 2023-11-14 DIAGNOSIS — F32.A DEPRESSIVE DISORDER: ICD-10-CM

## 2023-11-14 DIAGNOSIS — F41.1 GAD (GENERALIZED ANXIETY DISORDER): Primary | ICD-10-CM

## 2023-11-14 PROCEDURE — 99214 OFFICE O/P EST MOD 30 MIN: CPT | Mod: VID | Performed by: NURSE PRACTITIONER

## 2023-11-14 RX ORDER — CITALOPRAM HYDROBROMIDE 10 MG/1
TABLET ORAL
Qty: 55 TABLET | Refills: 0 | Status: SHIPPED | OUTPATIENT
Start: 2023-11-14 | End: 2024-01-30

## 2023-11-14 ASSESSMENT — ANXIETY QUESTIONNAIRES
7. FEELING AFRAID AS IF SOMETHING AWFUL MIGHT HAPPEN: SEVERAL DAYS
6. BECOMING EASILY ANNOYED OR IRRITABLE: NOT AT ALL
GAD7 TOTAL SCORE: 5
2. NOT BEING ABLE TO STOP OR CONTROL WORRYING: SEVERAL DAYS
5. BEING SO RESTLESS THAT IT IS HARD TO SIT STILL: NOT AT ALL
3. WORRYING TOO MUCH ABOUT DIFFERENT THINGS: SEVERAL DAYS
GAD7 TOTAL SCORE: 5
IF YOU CHECKED OFF ANY PROBLEMS ON THIS QUESTIONNAIRE, HOW DIFFICULT HAVE THESE PROBLEMS MADE IT FOR YOU TO DO YOUR WORK, TAKE CARE OF THINGS AT HOME, OR GET ALONG WITH OTHER PEOPLE: SOMEWHAT DIFFICULT
1. FEELING NERVOUS, ANXIOUS, OR ON EDGE: SEVERAL DAYS

## 2023-11-14 ASSESSMENT — PATIENT HEALTH QUESTIONNAIRE - PHQ9
SUM OF ALL RESPONSES TO PHQ QUESTIONS 1-9: 2
5. POOR APPETITE OR OVEREATING: SEVERAL DAYS

## 2023-11-14 NOTE — PATIENT INSTRUCTIONS
INGE (generalized anxiety disorder)  Chronic, anxiety is improved on Lexapro, however since starting and increasing dose has been having extremely heavy, crampy periods with clots.  Having approximately 17 days in between each cycle.  Did review side effects and this is a distant side effect.  Discussed changing medication as well as gene testing.  After shared decision making, will change to Celexa.  Patient okay to take half a tab of Lexapro this evening and start Celexa tomorrow.  Should be receiving a phone call for genetic testing.  Patient to follow-up with this provider in 3 to 4 weeks for recheck.  - citalopram (CELEXA) 10 MG tablet; Take 1 tablet (10 mg) by mouth daily for 7 days, THEN 2 tablets (20 mg) daily for 24 days.  - Adult Genetics & Metabolism Referral; Future    Depressive disorder  Chronic, stable with medication side effects as above.  - citalopram (CELEXA) 10 MG tablet; Take 1 tablet (10 mg) by mouth daily for 7 days, THEN 2 tablets (20 mg) daily for 24 days.  - Adult Genetics & Metabolism Referral; Future

## 2023-11-14 NOTE — NURSING NOTE
"Chief Complaint   Patient presents with    Anxiety       Initial LMP 11/12/2023 (Exact Date)  Estimated body mass index is 34.2 kg/m  as calculated from the following:    Height as of 3/28/23: 1.549 m (5' 1\").    Weight as of 9/6/23: 82.1 kg (181 lb).    Patient presents to the clinic using No DME    Is there anyone who you would like to be able to receive your results? No  If yes have patient fill out ELROY      "

## 2023-11-14 NOTE — PROGRESS NOTES
"Laura is a 28 year old who is being evaluated via a billable video visit.      How would you like to obtain your AVS? MyChart  If the video visit is dropped, the invitation should be resent by: Text to cell phone: [phone]  Will anyone else be joining your video visit? No  Sent link          Assessment & Plan     INGE (generalized anxiety disorder)  Chronic, anxiety is improved on Lexapro, however since starting and increasing dose has been having extremely heavy, crampy periods with clots.  Having approximately 17 days in between each cycle.  Did review side effects and this is a distant side effect.  Discussed changing medication as well as gene testing.  After shared decision making, will change to Celexa.  Patient okay to take half a tab of Lexapro this evening and start Celexa tomorrow.  Should be receiving a phone call for genetic testing.  Patient to follow-up with this provider in 3 to 4 weeks for recheck.  - citalopram (CELEXA) 10 MG tablet; Take 1 tablet (10 mg) by mouth daily for 7 days, THEN 2 tablets (20 mg) daily for 24 days.  - Adult Genetics & Metabolism Referral; Future    Depressive disorder  Chronic, stable with medication side effects as above.  - citalopram (CELEXA) 10 MG tablet; Take 1 tablet (10 mg) by mouth daily for 7 days, THEN 2 tablets (20 mg) daily for 24 days.  - Adult Genetics & Metabolism Referral; Future             BMI:   Estimated body mass index is 34.2 kg/m  as calculated from the following:    Height as of 3/28/23: 1.549 m (5' 1\").    Weight as of 9/6/23: 82.1 kg (181 lb).   Weight management plan: Discussed healthy diet and exercise guidelines    See Patient Instructions    Maggi Pichardo, YARI, APRN-CNP   [facility]    Subjective   Laura is a 28 year old, presenting for the following health issues:  Anxiety      11/14/2023    10:14 AM   Additional Questions   Roomed by jayla cyr cma       HPI     Anxiety Follow-Up  How are you doing with " your anxiety since your last visit? Improved  Are you having other symptoms that might be associated with anxiety? Yes:  post partum   Have you had a significant life event? OTHER: baby     Are you feeling depressed? No  Do you have any concerns with your use of alcohol or other drugs? No    Anxiety is doing well, however having side effects  Sertraline was doing really well ~ and then delivered and anxiety went through the roof. Tried to up the Zoloft, and didn't help.   Switched to Lexapro and did well ~ upped to 20 mg since August. Since has had 5 periods. Having cycles every 2 weeks. On average 17 days between cycles, super heavy and clots.   The only change is the medication   Has been on Buspar ~ made super sick    Anxiety, baseline is still higher than prior to delivery   Discussed genetic testing    Social History     Tobacco Use    Smoking status: Every Day     Packs/day: .25     Types: Cigarettes    Smokeless tobacco: Never    Tobacco comments:     Smokes less than 5 per day   Vaping Use    Vaping Use: Never used   Substance Use Topics    Alcohol use: Yes     Comment: rare    Drug use: No         8/9/2023    10:16 AM 8/16/2023     8:43 AM 9/6/2023     1:31 PM   INGE-7 SCORE   Total Score 19 (severe anxiety)     Total Score 19 17 4         4/9/2021     8:54 AM 10/5/2021     2:12 PM 8/16/2023     3:47 PM   PHQ   PHQ-9 Total Score 9 8 10   Q9: Thoughts of better off dead/self-harm past 2 weeks Not at all Not at all Not at all         11/14/2023    10:16 AM   Last PHQ-9   1.  Little interest or pleasure in doing things 0   2.  Feeling down, depressed, or hopeless 0   3.  Trouble falling or staying asleep, or sleeping too much 0   4.  Feeling tired or having little energy 0   5.  Poor appetite or overeating 1   6.  Feeling bad about yourself 0   7.  Trouble concentrating 0   8.  Moving slowly or restless 1   Q9: Thoughts of better off dead/self-harm past 2 weeks 0   PHQ-9 Total Score 2   Difficulty at work,  home, or with people Not difficult at all         11/14/2023    10:16 AM   INGE-7    1. Feeling nervous, anxious, or on edge 1   2. Not being able to stop or control worrying 1   3. Worrying too much about different things 1   4. Trouble relaxing 1   5. Being so restless that it is hard to sit still 0   6. Becoming easily annoyed or irritable 0   7. Feeling afraid, as if something awful might happen 1   INGE-7 Total Score 5   If you checked any problems, how difficult have they made it for you to do your work, take care of things at home, or get along with other people? Somewhat difficult           Review of Systems   Constitutional, HEENT, cardiovascular, pulmonary, gi and gu systems are negative, except as otherwise noted.      Objective           Vitals:  No vitals were obtained today due to virtual visit.    Physical Exam   GENERAL: Healthy, alert and no distress  EYES: Eyes grossly normal to inspection.  No discharge or erythema, or obvious scleral/conjunctival abnormalities.  RESP: No audible wheeze, cough, or visible cyanosis.  No visible retractions or increased work of breathing.    SKIN: Visible skin clear. No significant rash, abnormal pigmentation or lesions.  NEURO: Cranial nerves grossly intact.  Mentation and speech appropriate for age.  PSYCH: Mentation appears normal, affect normal/bright, judgement and insight intact, normal speech and appearance well-groomed.    Diagnostic Test Results:  none            Video-Visit Details    Type of service:  Video Visit     Originating Location (pt. Location): Home    Distant Location (provider location):  Off-site  Platform used for Video Visit: Cortilia    Chart documentation with Dragon Voice recognition Software. Although reviewed after completion, some words and grammatical errors may remain.

## 2023-11-26 ENCOUNTER — HEALTH MAINTENANCE LETTER (OUTPATIENT)
Age: 28
End: 2023-11-26

## 2023-12-13 ENCOUNTER — MEDICAL CORRESPONDENCE (OUTPATIENT)
Dept: HEALTH INFORMATION MANAGEMENT | Facility: CLINIC | Age: 28
End: 2023-12-13
Payer: COMMERCIAL

## 2023-12-27 ENCOUNTER — MYC MEDICAL ADVICE (OUTPATIENT)
Dept: FAMILY MEDICINE | Facility: CLINIC | Age: 28
End: 2023-12-27
Payer: COMMERCIAL

## 2023-12-27 DIAGNOSIS — F41.1 GAD (GENERALIZED ANXIETY DISORDER): Primary | ICD-10-CM

## 2023-12-27 DIAGNOSIS — F32.A DEPRESSIVE DISORDER: ICD-10-CM

## 2023-12-27 NOTE — TELEPHONE ENCOUNTER
Pending Prescriptions:                       Disp   Refills    citalopram (CELEXA) 20 MG tablet [Pharmac*24 tab*             Sig: TAKE 1 TABLET BY MOUTH EVERY DAY, TAKE AFTER THE           10MG TABLETS ARE FINISHED.    Routing refill request to provider for review/approval because:  Pt was to follow up for refill  Sent pt a mychart asking how it is working for her      Santosh Hill RN

## 2023-12-28 RX ORDER — CITALOPRAM HYDROBROMIDE 20 MG/1
20 TABLET ORAL DAILY
Qty: 30 TABLET | Refills: 0 | Status: SHIPPED | OUTPATIENT
Start: 2023-12-28 | End: 2024-02-01

## 2024-01-19 ENCOUNTER — VIRTUAL VISIT (OUTPATIENT)
Dept: MIDWIFE SERVICES | Facility: CLINIC | Age: 29
End: 2024-01-19
Payer: COMMERCIAL

## 2024-01-19 DIAGNOSIS — O92.4 DECREASED MILK PRODUCTION: ICD-10-CM

## 2024-01-19 PROCEDURE — 99441 PR PHYSICIAN TELEPHONE EVALUATION 5-10 MIN: CPT | Mod: 93 | Performed by: ADVANCED PRACTICE MIDWIFE

## 2024-01-19 RX ORDER — METOCLOPRAMIDE 10 MG/1
10 TABLET ORAL 3 TIMES DAILY PRN
Qty: 90 TABLET | Refills: 1 | Status: ON HOLD | OUTPATIENT
Start: 2024-01-19 | End: 2024-07-20

## 2024-01-19 NOTE — PROGRESS NOTES
Laura is a 28 year old who is being evaluated via a billable telephone visit.      What phone number would you like to be contacted at? [phone]   How would you like to obtain your AVS? Cassandra      Distant Location (provider location):  On-site  Patient location: Home    Assessment & Plan     (Z39.1) Lactating mother  (primary encounter diagnosis)  Plan: metoclopramide (REGLAN) 10 MG tablet  -Discussed risk of depression and tardive dyskinesia.   -Will plan to send her chart to [facility] to help get her scheduled as soon as possible for further lactation intervention as needed.    (O92.4) Decreased milk production  Plan: metoclopramide (REGLAN) 10 MG tablet        No follow-ups on file.    Subjective   Laura is a 28 year old, presenting for the following health issues:  Lactation Consult    HPI     Laura has concerns of decreased milk production and would like to discuss medication interventions to help increase her supply. She had her baby 05/21/23 with the [facility] team. She has struggled with lactation since the beginning. She has been primarily exclusively pumping, and has been able to get about 8oz of breastmilk per day, which she mixes with formula. She is satisfied with that amount, and would like to keep providing that until 1 year, if possible. It is important to her that her baby get some breastmilk. She has tried bringing baby to the breast intermittently, but it has not been successful. Before returning to work, she was pumping about 5 times per day to get the 8oz supply. When she went back to work, she had some troubles initially fitting pumping into her day, and she had some days where she went 12 hours without pumping. Her milk supply dropped drastically, and she is now only able to get about 1oz. She has tried more frequent pumping. She tried some supplements like Mother's Milk Tea and fennel tincture, as well as other herbal remedies to support milk production. Nothing has been  successful in increasing her supply. She has a history of anxiety, which is managed with celexa. Denies history of depression.      Objective       Vitals:  No vitals were obtained today due to virtual visit.    Physical Exam   General: Alert and no distress //Respiratory: No audible wheeze, cough, or shortness of breath // Psychiatric:  Appropriate affect, tone, and pace of words        Phone call duration: 10 minutes  Signed Electronically by: Marcus Kumari CNM

## 2024-01-30 ENCOUNTER — VIRTUAL VISIT (OUTPATIENT)
Dept: FAMILY MEDICINE | Facility: CLINIC | Age: 29
End: 2024-01-30
Payer: COMMERCIAL

## 2024-01-30 ENCOUNTER — MYC MEDICAL ADVICE (OUTPATIENT)
Dept: FAMILY MEDICINE | Facility: CLINIC | Age: 29
End: 2024-01-30

## 2024-01-30 DIAGNOSIS — J06.9 VIRAL UPPER RESPIRATORY TRACT INFECTION WITH COUGH: Primary | ICD-10-CM

## 2024-01-30 PROCEDURE — 99213 OFFICE O/P EST LOW 20 MIN: CPT | Mod: 95 | Performed by: NURSE PRACTITIONER

## 2024-01-30 NOTE — PROGRESS NOTES
Laura is a 28 year old who is being evaluated via a billable video visit.      How would you like to obtain your AVS? MyChart  If the video visit is dropped, the invitation should be resent by: Text to cell phone: [phone]  Will anyone else be joining your video visit? No          Assessment & Plan     Viral upper respiratory tract infection with cough  Patient with 1 week history of increased nasal congestion and postnasal drainage accompanied by cough, sore throat and fatigue.  Denies any fevers.  Throat is mildly improving.  Negative COVID testing at home.  Discussed other possibilities of viruses.  Would recommend conservative management at this point and if symptoms or not improving in 5 to 7 days would recommend follow-up.  Patient to start:  ~Flonase nasal spray, 2 sprays each nostril daily.  Reviewed proper administration instructions with patient.  ~An over-the-counter daily antihistamine such as Claritin or Zyrtec once daily  ~Nasal saline flush such as Hanh pot  ~Plenty of fluids, rest, warm salt water gargles, elevating head of bed, use of a coolmist vaporizer and Tylenol and or ibuprofen as needed.            See Patient Instructions    Subjective   Laura is a 28 year old, presenting for the following health issues:  Pharyngitis    HPI   ENT Symptoms             Symptoms: cc Present Absent Comment   Fever/Chills   x    Fatigue  x     Muscle Aches  x     Eye Irritation   x    Sneezing   x    Nasal Rangel/Drg  x  More post nasal drainage    Sinus Pressure/Pain   x    Loss of smell   x    Dental pain   x    Sore Throat  x  Was red with patchy whites, the white has gone away   Swollen Glands  x     Ear Pain/Fullness  x  fullness   Cough  x  Productive    Wheeze   x    Chest Pain   x    Shortness of breath   x    Rash   x    Other   x Denies GI sx   Had 2 negative COVID home tests  Symptom duration:  1 week   Symptom severity:  5/10 throat pain   Treatments tried:  Robitussin and cough drops    Contacts:  None known            Review of Systems  Constitutional, HEENT, cardiovascular, pulmonary, gi and gu systems are negative, except as otherwise noted.      Objective           Vitals:  No vitals were obtained today due to virtual visit.    Physical Exam   GENERAL: alert and no distress  EYES: Eyes grossly normal to inspection.  No discharge or erythema, or obvious scleral/conjunctival abnormalities.  RESP: No audible wheeze, cough, or visible cyanosis.    SKIN: Visible skin clear. No significant rash, abnormal pigmentation or lesions.  NEURO: Cranial nerves grossly intact.  Mentation and speech appropriate for age.  PSYCH: Appropriate affect, tone, and pace of words    Diagnostic Test Results:  Labs reviewed in Epic  none        Video-Visit Details    Type of service:  Video Visit     Originating Location (pt. Location): Home    Distant Location (provider location):  Off-site  Platform used for Video Visit: Kylah  Signed Electronically by: Maggi Diana DNP, APRN-CNP       Chart documentation with Dragon Voice recognition Software. Although reviewed after completion, some words and grammatical errors may remain.

## 2024-01-30 NOTE — PATIENT INSTRUCTIONS
Viral upper respiratory tract infection with cough  Patient with 1 week history of increased nasal congestion and postnasal drainage accompanied by cough, sore throat and fatigue.  Denies any fevers.  Throat is mildly improving.  Negative COVID testing at home.  Discussed other possibilities of viruses.  Would recommend conservative management at this point and if symptoms or not improving in 5 to 7 days would recommend follow-up.  Patient to start:  ~Flonase nasal spray, 2 sprays each nostril daily.  Reviewed proper administration instructions with patient.  ~An over-the-counter daily antihistamine such as Claritin or Zyrtec once daily  ~Nasal saline flush such as Sutton pot  ~Plenty of fluids, rest, warm salt water gargles, elevating head of bed, use of a coolmist vaporizer and Tylenol and or ibuprofen as needed.

## 2024-01-30 NOTE — LETTER
[facility]  [address] [place] [id number]  [phone]          January 30, 2024    Laura Corral                                                                                                                     [address]  [place] [address]        To Whom it may concern,    Please excuse Laura from work today due to illness.        Sincerely,       Maggi Diana, DNP, APRN-CNP

## 2024-01-31 DIAGNOSIS — F32.A DEPRESSIVE DISORDER: ICD-10-CM

## 2024-01-31 DIAGNOSIS — F41.1 GAD (GENERALIZED ANXIETY DISORDER): ICD-10-CM

## 2024-02-01 RX ORDER — CITALOPRAM HYDROBROMIDE 20 MG/1
20 TABLET ORAL DAILY
Qty: 30 TABLET | Refills: 2 | Status: SHIPPED | OUTPATIENT
Start: 2024-02-01 | End: 2024-04-29

## 2024-03-19 ENCOUNTER — VIRTUAL VISIT (OUTPATIENT)
Dept: FAMILY MEDICINE | Facility: CLINIC | Age: 29
End: 2024-03-19
Payer: COMMERCIAL

## 2024-03-19 DIAGNOSIS — L43.9 LICHEN PLANUS: Primary | ICD-10-CM

## 2024-03-19 PROCEDURE — 99214 OFFICE O/P EST MOD 30 MIN: CPT | Mod: 95 | Performed by: NURSE PRACTITIONER

## 2024-03-19 RX ORDER — MOMETASONE FUROATE 1 MG/ML
SOLUTION TOPICAL DAILY
Qty: 60 ML | Refills: 1 | Status: ON HOLD | OUTPATIENT
Start: 2024-03-19 | End: 2024-07-20

## 2024-03-19 RX ORDER — PREDNISONE 20 MG/1
TABLET ORAL
Qty: 20 TABLET | Refills: 0 | Status: ON HOLD | OUTPATIENT
Start: 2024-03-19 | End: 2024-07-20

## 2024-03-19 NOTE — PROGRESS NOTES
Laura is a 29 year old who is being evaluated via a billable video visit.    How would you like to obtain your AVS? MyChart  If the video visit is dropped, the invitation should be resent by: Text to cell phone: [phone]  Will anyone else be joining your video visit? No      Assessment & Plan     Lichen planus  Chronic history of lichen planus, has had for several years, with flares intermittently.  Last flare approximately 5 years ago.  Saw dermatology, is working on getting records sent to clinic.  This provider visualize rash in office earlier this week and patient will upload photos as well.  Fairly significant rash on bilateral hands, arms, feet and legs.  Will proceed with a longer course of steroids as well as topical steroid solution.  Patient to follow-up if symptoms not improving or worsening, may need dermatology referral.  - predniSONE (DELTASONE) 20 MG tablet; Take 3 tabs by mouth daily x 3 days, then 2 tabs daily x 3 days, then 1 tab daily x 3 days, then 1/2 tab daily x 3 days.  - mometasone (ELOCON) 0.1 % external solution; Apply topically daily        Nicotine/Tobacco Cessation  She reports that she has been smoking cigarettes. She has been smoking an average of 0.3 packs per day. She has never been exposed to tobacco smoke. She has never used smokeless tobacco.  Nicotine/Tobacco Cessation Plan  Not addressed today        See Patient Instructions    Subjective   Laura is a 29 year old, presenting for the following health issues:  Derm Problem    History of Present Illness       Reason for visit:  Lichen Planus    She eats 0-1 servings of fruits and vegetables daily.She consumes 0 sweetened beverage(s) daily.She exercises with enough effort to increase her heart rate 10 to 19 minutes per day.  She exercises with enough effort to increase her heart rate 3 or less days per week.   She is taking medications regularly.     Lichen planus     Duration: 3 months tis round  Description  Location:  hands, feet, arms, legs and torso  Itching: moderate  Intensity:  moderate  Accompanying signs and symptoms: None  History (similar episodes/previous evaluation): diagnosed by biopsy in the past  Precipitating or alleviating factors:  New exposures:  None  Recent travel: no    Therapies tried and outcome: none     Has had for years and only had 1 other flare [id number] ~ mostly on feet  Blistery, red on palms, hands, arms, feet and legs ~ spreading   Had seen Dermatology with biopsy done   Called acute and chronic   No recent triggers ~ per knowledge flares every 5-10 years, and with stress    Did high dose prednisone    Review of Systems  Constitutional, HEENT, cardiovascular, pulmonary, gi and gu systems are negative, except as otherwise noted.      Objective           Vitals:  No vitals were obtained today due to virtual visit.    Physical Exam   GENERAL: alert and no distress  EYES: Eyes grossly normal to inspection.  No discharge or erythema, or obvious scleral/conjunctival abnormalities.  RESP: No audible wheeze, cough, or visible cyanosis.    SKIN: Visualized in office, many erythematous, papular and vesicular rash on bilateral hands, arms, feet, legs  NEURO: Cranial nerves grossly intact.  Mentation and speech appropriate for age.  PSYCH: Appropriate affect, tone, and pace of words                                  Diagnostic Test Results:  none        Video-Visit Details    Type of service:  Video Visit   Originating Location (pt. Location): Home    Distant Location (provider location):  Off-site  Platform used for Video Visit: Kylah  Signed Electronically by: Maggi Diana DNP, APRN-CNP       Chart documentation with Dragon Voice recognition Software. Although reviewed after completion, some words and grammatical errors may remain.

## 2024-03-19 NOTE — PATIENT INSTRUCTIONS
Lichen planus  Chronic history of lichen planus, has had for several years, with flares intermittently.  Last flare approximately 5 years ago.  Saw dermatology, is working on getting records sent to clinic.  This provider visualize rash in office earlier this week and patient will upload photos as well.  Fairly significant rash on bilateral hands, arms, feet and legs.  Will proceed with a longer course of steroids as well as topical steroid solution.  Patient to follow-up if symptoms not improving or worsening, may need dermatology referral.  - predniSONE (DELTASONE) 20 MG tablet; Take 3 tabs by mouth daily x 3 days, then 2 tabs daily x 3 days, then 1 tab daily x 3 days, then 1/2 tab daily x 3 days.  - mometasone (ELOCON) 0.1 % external solution; Apply topically daily

## 2024-03-29 ENCOUNTER — MYC MEDICAL ADVICE (OUTPATIENT)
Dept: FAMILY MEDICINE | Facility: CLINIC | Age: 29
End: 2024-03-29
Payer: COMMERCIAL

## 2024-03-29 DIAGNOSIS — L43.9 LICHEN PLANUS: Primary | ICD-10-CM

## 2024-03-29 RX ORDER — PREDNISONE 20 MG/1
TABLET ORAL
Qty: 21 TABLET | Refills: 0 | Status: ON HOLD | OUTPATIENT
Start: 2024-03-29 | End: 2024-07-20

## 2024-04-28 DIAGNOSIS — F41.1 GAD (GENERALIZED ANXIETY DISORDER): ICD-10-CM

## 2024-04-28 DIAGNOSIS — F32.A DEPRESSIVE DISORDER: ICD-10-CM

## 2024-04-29 RX ORDER — CITALOPRAM HYDROBROMIDE 20 MG/1
20 TABLET ORAL DAILY
Qty: 30 TABLET | Refills: 0 | Status: SHIPPED | OUTPATIENT
Start: 2024-04-29 | End: 2024-06-05

## 2024-06-04 DIAGNOSIS — F32.A DEPRESSIVE DISORDER: ICD-10-CM

## 2024-06-04 DIAGNOSIS — F41.1 GAD (GENERALIZED ANXIETY DISORDER): ICD-10-CM

## 2024-06-05 RX ORDER — CITALOPRAM HYDROBROMIDE 20 MG/1
20 TABLET ORAL DAILY
Qty: 90 TABLET | Refills: 1 | Status: SHIPPED | OUTPATIENT
Start: 2024-06-05

## 2024-06-20 ENCOUNTER — TRANSFERRED RECORDS (OUTPATIENT)
Dept: HEALTH INFORMATION MANAGEMENT | Facility: CLINIC | Age: 29
End: 2024-06-20
Payer: COMMERCIAL

## 2024-07-19 ENCOUNTER — HOSPITAL ENCOUNTER (INPATIENT)
Facility: HOSPITAL | Age: 29
LOS: 1 days | Discharge: HOME | DRG: 399 | End: 2024-07-20
Attending: STUDENT IN AN ORGANIZED HEALTH CARE EDUCATION/TRAINING PROGRAM | Admitting: INTERNAL MEDICINE
Payer: COMMERCIAL

## 2024-07-19 ENCOUNTER — HOSPITAL ENCOUNTER (INPATIENT)
Facility: HOSPITAL | Age: 29
Setting detail: SURGERY ADMIT
End: 2024-07-19
Attending: SURGERY | Admitting: SURGERY
Payer: COMMERCIAL

## 2024-07-19 DIAGNOSIS — K35.200 ACUTE APPENDICITIS WITH GENERALIZED PERITONITIS WITHOUT GANGRENE, PERFORATION, OR ABSCESS: Primary | ICD-10-CM

## 2024-07-20 ENCOUNTER — ANESTHESIA (OUTPATIENT)
Dept: SURGERY | Facility: HOSPITAL | Age: 29
DRG: 399 | End: 2024-07-20
Payer: COMMERCIAL

## 2024-07-20 ENCOUNTER — ANESTHESIA EVENT (OUTPATIENT)
Dept: SURGERY | Facility: HOSPITAL | Age: 29
DRG: 399 | End: 2024-07-20
Payer: COMMERCIAL

## 2024-07-20 VITALS
SYSTOLIC BLOOD PRESSURE: 121 MMHG | OXYGEN SATURATION: 97 % | BODY MASS INDEX: 35.96 KG/M2 | DIASTOLIC BLOOD PRESSURE: 77 MMHG | WEIGHT: 190.48 LBS | HEART RATE: 75 BPM | HEIGHT: 61 IN | TEMPERATURE: 97 F | RESPIRATION RATE: 14 BRPM

## 2024-07-20 PROBLEM — E66.9 OBESITY: Status: ACTIVE | Noted: 2024-07-20

## 2024-07-20 PROBLEM — K35.80 ACUTE APPENDICITIS: Status: ACTIVE | Noted: 2024-07-20

## 2024-07-20 PROBLEM — K35.209 ACUTE APPENDICITIS WITH GENERALIZED PERITONITIS: Status: ACTIVE | Noted: 2024-07-20

## 2024-07-20 LAB
ALBUMIN SERPL BCG-MCNC: 3.9 G/DL (ref 3.5–5.2)
ALP SERPL-CCNC: 70 U/L (ref 40–150)
ALT SERPL W P-5'-P-CCNC: 15 U/L (ref 0–50)
ANION GAP SERPL CALCULATED.3IONS-SCNC: 7 MMOL/L (ref 7–15)
AST SERPL W P-5'-P-CCNC: 13 U/L (ref 0–45)
BASOPHILS # BLD AUTO: 0.1 10E3/UL (ref 0–0.2)
BASOPHILS NFR BLD AUTO: 1 %
BILIRUB SERPL-MCNC: 0.3 MG/DL
BUN SERPL-MCNC: 9.4 MG/DL (ref 6–20)
CALCIUM SERPL-MCNC: 8.9 MG/DL (ref 8.8–10.4)
CHLORIDE SERPL-SCNC: 105 MMOL/L (ref 98–107)
CREAT SERPL-MCNC: 0.72 MG/DL (ref 0.51–0.95)
EGFRCR SERPLBLD CKD-EPI 2021: >90 ML/MIN/1.73M2
EOSINOPHIL # BLD AUTO: 0.1 10E3/UL (ref 0–0.7)
EOSINOPHIL NFR BLD AUTO: 1 %
ERYTHROCYTE [DISTWIDTH] IN BLOOD BY AUTOMATED COUNT: 14.4 % (ref 10–15)
GLUCOSE SERPL-MCNC: 117 MG/DL (ref 70–99)
HCO3 SERPL-SCNC: 26 MMOL/L (ref 22–29)
HCT VFR BLD AUTO: 37.9 % (ref 35–47)
HGB BLD-MCNC: 12.3 G/DL (ref 11.7–15.7)
IMM GRANULOCYTES # BLD: 0.1 10E3/UL
IMM GRANULOCYTES NFR BLD: 1 %
LYMPHOCYTES # BLD AUTO: 2.7 10E3/UL (ref 0.8–5.3)
LYMPHOCYTES NFR BLD AUTO: 25 %
MAGNESIUM SERPL-MCNC: 2.2 MG/DL (ref 1.7–2.3)
MCH RBC QN AUTO: 27.8 PG (ref 26.5–33)
MCHC RBC AUTO-ENTMCNC: 32.5 G/DL (ref 31.5–36.5)
MCV RBC AUTO: 86 FL (ref 78–100)
MONOCYTES # BLD AUTO: 0.7 10E3/UL (ref 0–1.3)
MONOCYTES NFR BLD AUTO: 7 %
NEUTROPHILS # BLD AUTO: 7.3 10E3/UL (ref 1.6–8.3)
NEUTROPHILS NFR BLD AUTO: 67 %
NRBC # BLD AUTO: 0 10E3/UL
NRBC BLD AUTO-RTO: 0 /100
PLATELET # BLD AUTO: 265 10E3/UL (ref 150–450)
POTASSIUM SERPL-SCNC: 4.1 MMOL/L (ref 3.4–5.3)
PROT SERPL-MCNC: 6.2 G/DL (ref 6.4–8.3)
RBC # BLD AUTO: 4.43 10E6/UL (ref 3.8–5.2)
SODIUM SERPL-SCNC: 138 MMOL/L (ref 135–145)
WBC # BLD AUTO: 10.9 10E3/UL (ref 4–11)

## 2024-07-20 PROCEDURE — 44970 LAPAROSCOPY APPENDECTOMY: CPT | Performed by: NURSE ANESTHETIST, CERTIFIED REGISTERED

## 2024-07-20 PROCEDURE — 36415 COLL VENOUS BLD VENIPUNCTURE: CPT | Performed by: INTERNAL MEDICINE

## 2024-07-20 PROCEDURE — 83735 ASSAY OF MAGNESIUM: CPT | Performed by: INTERNAL MEDICINE

## 2024-07-20 PROCEDURE — 85025 COMPLETE CBC W/AUTO DIFF WBC: CPT | Performed by: INTERNAL MEDICINE

## 2024-07-20 PROCEDURE — 99236 HOSP IP/OBS SAME DATE HI 85: CPT | Performed by: INTERNAL MEDICINE

## 2024-07-20 PROCEDURE — 272N000001 HC OR GENERAL SUPPLY STERILE: Performed by: SURGERY

## 2024-07-20 PROCEDURE — 250N000009 HC RX 250: Performed by: NURSE ANESTHETIST, CERTIFIED REGISTERED

## 2024-07-20 PROCEDURE — 710N000009 HC RECOVERY PHASE 1, LEVEL 1, PER MIN: Performed by: SURGERY

## 2024-07-20 PROCEDURE — 250N000011 HC RX IP 250 OP 636: Performed by: NURSE ANESTHETIST, CERTIFIED REGISTERED

## 2024-07-20 PROCEDURE — 999N000141 HC STATISTIC PRE-PROCEDURE NURSING ASSESSMENT: Performed by: SURGERY

## 2024-07-20 PROCEDURE — 99140 ANES COMP EMERGENCY COND: CPT | Performed by: NURSE ANESTHETIST, CERTIFIED REGISTERED

## 2024-07-20 PROCEDURE — 0DTJ4ZZ RESECTION OF APPENDIX, PERCUTANEOUS ENDOSCOPIC APPROACH: ICD-10-PCS | Performed by: SURGERY

## 2024-07-20 PROCEDURE — 250N000009 HC RX 250: Performed by: ANESTHESIOLOGY

## 2024-07-20 PROCEDURE — 88304 TISSUE EXAM BY PATHOLOGIST: CPT | Mod: TC | Performed by: SURGERY

## 2024-07-20 PROCEDURE — 370N000017 HC ANESTHESIA TECHNICAL FEE, PER MIN: Performed by: SURGERY

## 2024-07-20 PROCEDURE — 258N000003 HC RX IP 258 OP 636: Performed by: ANESTHESIOLOGY

## 2024-07-20 PROCEDURE — 120N000001 HC R&B MED SURG/OB

## 2024-07-20 PROCEDURE — 44970 LAPAROSCOPY APPENDECTOMY: CPT | Performed by: SURGERY

## 2024-07-20 PROCEDURE — 99238 HOSP IP/OBS DSCHRG MGMT 30/<: CPT | Performed by: FAMILY MEDICINE

## 2024-07-20 PROCEDURE — 360N000076 HC SURGERY LEVEL 3, PER MIN: Performed by: SURGERY

## 2024-07-20 PROCEDURE — 82040 ASSAY OF SERUM ALBUMIN: CPT | Performed by: INTERNAL MEDICINE

## 2024-07-20 PROCEDURE — 250N000011 HC RX IP 250 OP 636: Performed by: SURGERY

## 2024-07-20 PROCEDURE — 88304 TISSUE EXAM BY PATHOLOGIST: CPT | Mod: 26 | Performed by: PATHOLOGY

## 2024-07-20 PROCEDURE — 250N000011 HC RX IP 250 OP 636: Performed by: INTERNAL MEDICINE

## 2024-07-20 PROCEDURE — 99222 1ST HOSP IP/OBS MODERATE 55: CPT | Mod: FS | Performed by: SURGERY

## 2024-07-20 PROCEDURE — 250N000011 HC RX IP 250 OP 636: Performed by: ANESTHESIOLOGY

## 2024-07-20 PROCEDURE — 44970 LAPAROSCOPY APPENDECTOMY: CPT | Performed by: ANESTHESIOLOGY

## 2024-07-20 PROCEDURE — 99207 PR NO BILLABLE SERVICE THIS VISIT: CPT | Performed by: PHYSICIAN ASSISTANT

## 2024-07-20 PROCEDURE — 710N000012 HC RECOVERY PHASE 2, PER MINUTE: Performed by: SURGERY

## 2024-07-20 PROCEDURE — 258N000003 HC RX IP 258 OP 636: Performed by: NURSE ANESTHETIST, CERTIFIED REGISTERED

## 2024-07-20 PROCEDURE — 250N000013 HC RX MED GY IP 250 OP 250 PS 637: Performed by: ANESTHESIOLOGY

## 2024-07-20 RX ORDER — NALOXONE HYDROCHLORIDE 0.4 MG/ML
0.4 INJECTION, SOLUTION INTRAMUSCULAR; INTRAVENOUS; SUBCUTANEOUS
Status: DISCONTINUED | OUTPATIENT
Start: 2024-07-20 | End: 2024-07-20 | Stop reason: HOSPADM

## 2024-07-20 RX ORDER — ONDANSETRON 2 MG/ML
4 INJECTION INTRAMUSCULAR; INTRAVENOUS EVERY 30 MIN PRN
Status: DISCONTINUED | OUTPATIENT
Start: 2024-07-20 | End: 2024-07-20 | Stop reason: HOSPADM

## 2024-07-20 RX ORDER — HYDROMORPHONE HCL IN WATER/PF 6 MG/30 ML
0.4 PATIENT CONTROLLED ANALGESIA SYRINGE INTRAVENOUS EVERY 5 MIN PRN
Status: DISCONTINUED | OUTPATIENT
Start: 2024-07-20 | End: 2024-07-20 | Stop reason: HOSPADM

## 2024-07-20 RX ORDER — PROPOFOL 10 MG/ML
INJECTION, EMULSION INTRAVENOUS CONTINUOUS PRN
Status: DISCONTINUED | OUTPATIENT
Start: 2024-07-20 | End: 2024-07-20

## 2024-07-20 RX ORDER — LIDOCAINE 40 MG/G
CREAM TOPICAL
Status: DISCONTINUED | OUTPATIENT
Start: 2024-07-20 | End: 2024-07-20 | Stop reason: HOSPADM

## 2024-07-20 RX ORDER — ONDANSETRON 4 MG/1
4 TABLET, ORALLY DISINTEGRATING ORAL EVERY 30 MIN PRN
Status: DISCONTINUED | OUTPATIENT
Start: 2024-07-20 | End: 2024-07-20 | Stop reason: HOSPADM

## 2024-07-20 RX ORDER — HALOPERIDOL 5 MG/ML
1 INJECTION INTRAMUSCULAR
Status: DISCONTINUED | OUTPATIENT
Start: 2024-07-20 | End: 2024-07-20 | Stop reason: HOSPADM

## 2024-07-20 RX ORDER — MAGNESIUM SULFATE 4 G/50ML
4 INJECTION INTRAVENOUS ONCE
Status: COMPLETED | OUTPATIENT
Start: 2024-07-20 | End: 2024-07-20

## 2024-07-20 RX ORDER — NICOTINE 21 MG/24HR
1 PATCH, TRANSDERMAL 24 HOURS TRANSDERMAL DAILY PRN
Status: DISCONTINUED | OUTPATIENT
Start: 2024-07-20 | End: 2024-07-20 | Stop reason: HOSPADM

## 2024-07-20 RX ORDER — SODIUM CHLORIDE, SODIUM LACTATE, POTASSIUM CHLORIDE, CALCIUM CHLORIDE 600; 310; 30; 20 MG/100ML; MG/100ML; MG/100ML; MG/100ML
INJECTION, SOLUTION INTRAVENOUS CONTINUOUS
Status: DISCONTINUED | OUTPATIENT
Start: 2024-07-20 | End: 2024-07-20 | Stop reason: HOSPADM

## 2024-07-20 RX ORDER — DEXAMETHASONE SODIUM PHOSPHATE 4 MG/ML
4 INJECTION, SOLUTION INTRA-ARTICULAR; INTRALESIONAL; INTRAMUSCULAR; INTRAVENOUS; SOFT TISSUE
Status: DISCONTINUED | OUTPATIENT
Start: 2024-07-20 | End: 2024-07-20 | Stop reason: HOSPADM

## 2024-07-20 RX ORDER — PIPERACILLIN SODIUM, TAZOBACTAM SODIUM 3; .375 G/15ML; G/15ML
3.38 INJECTION, POWDER, LYOPHILIZED, FOR SOLUTION INTRAVENOUS EVERY 8 HOURS
Status: DISCONTINUED | OUTPATIENT
Start: 2024-07-20 | End: 2024-07-20

## 2024-07-20 RX ORDER — CALCIUM CARBONATE 500 MG/1
1000 TABLET, CHEWABLE ORAL 4 TIMES DAILY PRN
Status: DISCONTINUED | OUTPATIENT
Start: 2024-07-20 | End: 2024-07-20 | Stop reason: HOSPADM

## 2024-07-20 RX ORDER — NALOXONE HYDROCHLORIDE 0.4 MG/ML
0.2 INJECTION, SOLUTION INTRAMUSCULAR; INTRAVENOUS; SUBCUTANEOUS
Status: DISCONTINUED | OUTPATIENT
Start: 2024-07-20 | End: 2024-07-20 | Stop reason: HOSPADM

## 2024-07-20 RX ORDER — AMOXICILLIN 250 MG
2 CAPSULE ORAL 2 TIMES DAILY PRN
Status: DISCONTINUED | OUTPATIENT
Start: 2024-07-20 | End: 2024-07-20 | Stop reason: HOSPADM

## 2024-07-20 RX ORDER — DEXAMETHASONE SODIUM PHOSPHATE 10 MG/ML
INJECTION, SOLUTION INTRAMUSCULAR; INTRAVENOUS PRN
Status: DISCONTINUED | OUTPATIENT
Start: 2024-07-20 | End: 2024-07-20

## 2024-07-20 RX ORDER — NALOXONE HYDROCHLORIDE 0.4 MG/ML
0.1 INJECTION, SOLUTION INTRAMUSCULAR; INTRAVENOUS; SUBCUTANEOUS
Status: DISCONTINUED | OUTPATIENT
Start: 2024-07-20 | End: 2024-07-20 | Stop reason: HOSPADM

## 2024-07-20 RX ORDER — FENTANYL CITRATE 50 UG/ML
INJECTION, SOLUTION INTRAMUSCULAR; INTRAVENOUS PRN
Status: DISCONTINUED | OUTPATIENT
Start: 2024-07-20 | End: 2024-07-20

## 2024-07-20 RX ORDER — HYDROMORPHONE HCL IN WATER/PF 6 MG/30 ML
0.2 PATIENT CONTROLLED ANALGESIA SYRINGE INTRAVENOUS EVERY 5 MIN PRN
Status: DISCONTINUED | OUTPATIENT
Start: 2024-07-20 | End: 2024-07-20 | Stop reason: HOSPADM

## 2024-07-20 RX ORDER — GLYCOPYRROLATE 0.2 MG/ML
INJECTION, SOLUTION INTRAMUSCULAR; INTRAVENOUS PRN
Status: DISCONTINUED | OUTPATIENT
Start: 2024-07-20 | End: 2024-07-20

## 2024-07-20 RX ORDER — OXYCODONE HYDROCHLORIDE 5 MG/1
10 TABLET ORAL
Status: DISCONTINUED | OUTPATIENT
Start: 2024-07-20 | End: 2024-07-20 | Stop reason: HOSPADM

## 2024-07-20 RX ORDER — FENTANYL CITRATE 50 UG/ML
25 INJECTION, SOLUTION INTRAMUSCULAR; INTRAVENOUS EVERY 5 MIN PRN
Status: DISCONTINUED | OUTPATIENT
Start: 2024-07-20 | End: 2024-07-20 | Stop reason: HOSPADM

## 2024-07-20 RX ORDER — AMOXICILLIN 250 MG
1 CAPSULE ORAL 2 TIMES DAILY PRN
Status: DISCONTINUED | OUTPATIENT
Start: 2024-07-20 | End: 2024-07-20 | Stop reason: HOSPADM

## 2024-07-20 RX ORDER — DEXTROSE, SODIUM CHLORIDE, SODIUM LACTATE, POTASSIUM CHLORIDE, AND CALCIUM CHLORIDE 5; .6; .31; .03; .02 G/100ML; G/100ML; G/100ML; G/100ML; G/100ML
INJECTION, SOLUTION INTRAVENOUS CONTINUOUS
Status: DISCONTINUED | OUTPATIENT
Start: 2024-07-20 | End: 2024-07-20 | Stop reason: HOSPADM

## 2024-07-20 RX ORDER — DOCUSATE SODIUM 100 MG/1
100 CAPSULE, LIQUID FILLED ORAL 2 TIMES DAILY
Qty: 30 CAPSULE | Refills: 0 | Status: SHIPPED | OUTPATIENT
Start: 2024-07-20

## 2024-07-20 RX ORDER — PIPERACILLIN SODIUM, TAZOBACTAM SODIUM 3; .375 G/15ML; G/15ML
3.38 INJECTION, POWDER, LYOPHILIZED, FOR SOLUTION INTRAVENOUS ONCE
Status: DISCONTINUED | OUTPATIENT
Start: 2024-07-20 | End: 2024-07-20

## 2024-07-20 RX ORDER — HYDROCODONE BITARTRATE AND ACETAMINOPHEN 5; 325 MG/1; MG/1
1-2 TABLET ORAL EVERY 4 HOURS PRN
Qty: 10 TABLET | Refills: 0 | Status: SHIPPED | OUTPATIENT
Start: 2024-07-20

## 2024-07-20 RX ORDER — KETAMINE HYDROCHLORIDE 10 MG/ML
INJECTION INTRAMUSCULAR; INTRAVENOUS PRN
Status: DISCONTINUED | OUTPATIENT
Start: 2024-07-20 | End: 2024-07-20

## 2024-07-20 RX ORDER — IPRATROPIUM BROMIDE AND ALBUTEROL SULFATE 2.5; .5 MG/3ML; MG/3ML
3 SOLUTION RESPIRATORY (INHALATION) ONCE
Status: COMPLETED | OUTPATIENT
Start: 2024-07-20 | End: 2024-07-20

## 2024-07-20 RX ORDER — PIPERACILLIN SODIUM, TAZOBACTAM SODIUM 3; .375 G/15ML; G/15ML
3.38 INJECTION, POWDER, LYOPHILIZED, FOR SOLUTION INTRAVENOUS EVERY 8 HOURS
Status: DISCONTINUED | OUTPATIENT
Start: 2024-07-20 | End: 2024-07-20 | Stop reason: HOSPADM

## 2024-07-20 RX ORDER — PROPOFOL 10 MG/ML
INJECTION, EMULSION INTRAVENOUS PRN
Status: DISCONTINUED | OUTPATIENT
Start: 2024-07-20 | End: 2024-07-20

## 2024-07-20 RX ORDER — HYDROCODONE BITARTRATE AND ACETAMINOPHEN 5; 325 MG/1; MG/1
1 TABLET ORAL
Status: DISCONTINUED | OUTPATIENT
Start: 2024-07-20 | End: 2024-07-20 | Stop reason: HOSPADM

## 2024-07-20 RX ORDER — HYDROMORPHONE HYDROCHLORIDE 1 MG/ML
0.3 INJECTION, SOLUTION INTRAMUSCULAR; INTRAVENOUS; SUBCUTANEOUS
Status: DISCONTINUED | OUTPATIENT
Start: 2024-07-20 | End: 2024-07-20 | Stop reason: HOSPADM

## 2024-07-20 RX ORDER — FENTANYL CITRATE 50 UG/ML
50 INJECTION, SOLUTION INTRAMUSCULAR; INTRAVENOUS EVERY 5 MIN PRN
Status: DISCONTINUED | OUTPATIENT
Start: 2024-07-20 | End: 2024-07-20 | Stop reason: HOSPADM

## 2024-07-20 RX ORDER — CEFAZOLIN SODIUM/WATER 2 G/20 ML
SYRINGE (ML) INTRAVENOUS PRN
Status: DISCONTINUED | OUTPATIENT
Start: 2024-07-20 | End: 2024-07-20

## 2024-07-20 RX ORDER — OXYCODONE HYDROCHLORIDE 5 MG/1
5 TABLET ORAL
Status: COMPLETED | OUTPATIENT
Start: 2024-07-20 | End: 2024-07-20

## 2024-07-20 RX ORDER — LIDOCAINE HYDROCHLORIDE 10 MG/ML
INJECTION, SOLUTION INFILTRATION; PERINEURAL PRN
Status: DISCONTINUED | OUTPATIENT
Start: 2024-07-20 | End: 2024-07-20

## 2024-07-20 RX ORDER — ONDANSETRON 2 MG/ML
INJECTION INTRAMUSCULAR; INTRAVENOUS PRN
Status: DISCONTINUED | OUTPATIENT
Start: 2024-07-20 | End: 2024-07-20

## 2024-07-20 RX ORDER — BUPIVACAINE HYDROCHLORIDE 2.5 MG/ML
INJECTION, SOLUTION EPIDURAL; INFILTRATION; INTRACAUDAL PRN
Status: DISCONTINUED | OUTPATIENT
Start: 2024-07-20 | End: 2024-07-20 | Stop reason: HOSPADM

## 2024-07-20 RX ADMIN — PROPOFOL 200 MCG/KG/MIN: 10 INJECTION, EMULSION INTRAVENOUS at 10:10

## 2024-07-20 RX ADMIN — SUGAMMADEX 200 MG: 100 INJECTION, SOLUTION INTRAVENOUS at 10:43

## 2024-07-20 RX ADMIN — FENTANYL CITRATE 100 MCG: 50 INJECTION INTRAMUSCULAR; INTRAVENOUS at 10:10

## 2024-07-20 RX ADMIN — MAGNESIUM SULFATE HEPTAHYDRATE 4 G: 80 INJECTION, SOLUTION INTRAVENOUS at 09:45

## 2024-07-20 RX ADMIN — HYDROMORPHONE HYDROCHLORIDE 0.3 MG: 1 INJECTION, SOLUTION INTRAMUSCULAR; INTRAVENOUS; SUBCUTANEOUS at 06:53

## 2024-07-20 RX ADMIN — IPRATROPIUM BROMIDE AND ALBUTEROL SULFATE 3 ML: .5; 3 SOLUTION RESPIRATORY (INHALATION) at 11:02

## 2024-07-20 RX ADMIN — KETAMINE HYDROCHLORIDE 30 MG: 10 INJECTION INTRAMUSCULAR; INTRAVENOUS at 10:11

## 2024-07-20 RX ADMIN — SODIUM CHLORIDE, SODIUM LACTATE, POTASSIUM CHLORIDE, CALCIUM CHLORIDE AND DEXTROSE MONOHYDRATE: 5; 600; 310; 30; 20 INJECTION, SOLUTION INTRAVENOUS at 03:07

## 2024-07-20 RX ADMIN — ONDANSETRON 4 MG: 2 INJECTION INTRAMUSCULAR; INTRAVENOUS at 10:21

## 2024-07-20 RX ADMIN — PHENYLEPHRINE HYDROCHLORIDE 100 MCG: 10 INJECTION INTRAVENOUS at 10:09

## 2024-07-20 RX ADMIN — PROPOFOL 200 MG: 10 INJECTION, EMULSION INTRAVENOUS at 10:10

## 2024-07-20 RX ADMIN — FENTANYL CITRATE 50 MCG: 50 INJECTION INTRAMUSCULAR; INTRAVENOUS at 10:49

## 2024-07-20 RX ADMIN — HYDROMORPHONE HYDROCHLORIDE 0.3 MG: 1 INJECTION, SOLUTION INTRAMUSCULAR; INTRAVENOUS; SUBCUTANEOUS at 09:32

## 2024-07-20 RX ADMIN — Medication 100 MG: at 10:10

## 2024-07-20 RX ADMIN — MIDAZOLAM 2 MG: 1 INJECTION INTRAMUSCULAR; INTRAVENOUS at 10:04

## 2024-07-20 RX ADMIN — OXYCODONE HYDROCHLORIDE 5 MG: 5 TABLET ORAL at 12:28

## 2024-07-20 RX ADMIN — HYDROMORPHONE HYDROCHLORIDE 0.3 MG: 1 INJECTION, SOLUTION INTRAMUSCULAR; INTRAVENOUS; SUBCUTANEOUS at 02:30

## 2024-07-20 RX ADMIN — GLYCOPYRROLATE 0.2 MG: 0.2 INJECTION INTRAMUSCULAR; INTRAVENOUS at 10:08

## 2024-07-20 RX ADMIN — Medication 2 G: at 10:11

## 2024-07-20 RX ADMIN — ROCURONIUM BROMIDE 30 MG: 50 INJECTION, SOLUTION INTRAVENOUS at 10:17

## 2024-07-20 RX ADMIN — LIDOCAINE HYDROCHLORIDE 5 ML: 10 INJECTION, SOLUTION INFILTRATION; PERINEURAL at 10:10

## 2024-07-20 RX ADMIN — DEXAMETHASONE SODIUM PHOSPHATE 10 MG: 10 INJECTION, SOLUTION INTRAMUSCULAR; INTRAVENOUS at 10:21

## 2024-07-20 RX ADMIN — SUGAMMADEX 100 MG: 100 INJECTION, SOLUTION INTRAVENOUS at 10:50

## 2024-07-20 RX ADMIN — FAMOTIDINE 20 MG: 10 INJECTION, SOLUTION INTRAVENOUS at 09:28

## 2024-07-20 RX ADMIN — PIPERACILLIN AND TAZOBACTAM 3.38 G: 3; .375 INJECTION, POWDER, FOR SOLUTION INTRAVENOUS at 02:33

## 2024-07-20 RX ADMIN — SODIUM CHLORIDE, POTASSIUM CHLORIDE, SODIUM LACTATE AND CALCIUM CHLORIDE: 600; 310; 30; 20 INJECTION, SOLUTION INTRAVENOUS at 09:43

## 2024-07-20 RX ADMIN — ONDANSETRON 4 MG: 2 INJECTION INTRAMUSCULAR; INTRAVENOUS at 11:05

## 2024-07-20 ASSESSMENT — ACTIVITIES OF DAILY LIVING (ADL)
ADLS_ACUITY_SCORE: 20

## 2024-07-20 ASSESSMENT — LIFESTYLE VARIABLES: TOBACCO_USE: 1

## 2024-07-20 NOTE — ANESTHESIA CARE TRANSFER NOTE
Patient: Laura Corral    Procedure: Procedure(s):  APPENDECTOMY, LAPAROSCOPIC       Diagnosis: Appendicitis [K37]  Diagnosis Additional Information: No value filed.    Anesthesia Type:   No value filed.     Note:    Oropharynx: oropharynx clear of all foreign objects and spontaneously breathing  Level of Consciousness: awake  Oxygen Supplementation: nasal cannula  Level of Supplemental Oxygen (L/min / FiO2): 2  Independent Airway: airway patency satisfactory and stable    Vital Signs Stable: post-procedure vital signs reviewed and stable  Report to RN Given: handoff report given  Patient transferred to: PACU  Comments: Wheezy on expiration, mda ordered neb treatment for pacu  Handoff Report: Identifed the Patient, Identified the Reponsible Provider, Reviewed the pertinent medical history, Discussed the surgical course, Reviewed Intra-OP anesthesia mangement and issues during anesthesia, Set expectations for post-procedure period and Allowed opportunity for questions and acknowledgement of understanding      Vitals:see epic for vitals      Electronically Signed By: SIM Delatorre CRNA  July 20, 2024  11:02 AM

## 2024-07-20 NOTE — ANESTHESIA PROCEDURE NOTES
Airway       Patient location during procedure: OR       Procedure Start/Stop Times: 7/20/2024 10:15 AM  Staff -        Anesthesiologist:  Vanessa Rolle MD       CRNA: Ho Will APRN CRNA       Performed By: CRNAIndications and Patient Condition       Indications for airway management: sabrina-procedural       Induction type:RSI       Mask difficulty assessment: 0 - not attempted    Final Airway Details       Final airway type: endotracheal airway       Successful airway: ETT - single  Endotracheal Airway Details        ETT size (mm): 7.0       Successful intubation technique: video laryngoscopy       VL Blade Size: MAC 3       Grade View of Cords: 1       Position: Right       Measured from: lips       Secured at (cm): 19       Bite block used: None    Post intubation assessment        Placement verified by: capnometry, equal breath sounds and chest rise        Number of attempts at approach: 1       Number of other approaches attempted: 0       Secured with: tape       Ease of procedure: easy       Dentition: Intact and Unchanged    Medication(s) Administered   Medication Administration Time: 7/20/2024 10:15 AM

## 2024-07-20 NOTE — ANESTHESIA PREPROCEDURE EVALUATION
Anesthesia Pre-Procedure Evaluation    Patient: Laura Corral   MRN: [id number] : 1995        Procedure : Procedure(s):  APPENDECTOMY, LAPAROSCOPIC          Past Medical History:   Diagnosis Date     Anemia during pregnancy in third trimester 2023    28-week hemoglobin: 10.3 g/dL  Start Slow Fe 142 mg p.o. every other day with vitamin C 250 mg p.o.  5/10/2023: Hemoglobin 10.8 g/dL, taking beef liver tablets.  Iron studies ordered:____________     Chronic bilateral low back pain      Elevated blood pressure reading without diagnosis of hypertension 2023: 13 weeks 1 day: 149/85     Encounter for supervision of normal first pregnancy in third trimester 2023    Clinic/Hospital: [facility]  Partner Name: Ino  Ultrasound predicts sex: Surprise  Childrens names/ages: N/A  Previous labor experiences: N/A  Waterbirth (interest, declined, ineligible): Desires  Date waterbirth patient education reviewed: 5/3/23  Level of education/occupation: Some college, business administration  Pertinent History: INGE and MDD stable on sertraline 125 mg p.     High blood pressure due to oral contraceptive     Normotensive after discontinuing     More than expected weight gain in pregnancy 2023     Normal labor 2023      Past Surgical History:   Procedure Laterality Date     WISDOM TOOTH EXTRACTION       ZZHC CREATE EARDRUM OPENING,GEN ANESTH  1996      Allergies   Allergen Reactions     Latex Hives     Precaution      Social History     Tobacco Use     Smoking status: Every Day     Current packs/day: 0.25     Types: Cigarettes     Passive exposure: Never     Smokeless tobacco: Never     Tobacco comments:     Smokes less than 5 per day   Substance Use Topics     Alcohol use: Yes     Comment: rare      Wt Readings from Last 1 Encounters:   24 86.4 kg (190 lb 7.6 oz)        Anesthesia Evaluation   Pt has had prior anesthetic.     No history of anesthetic  "complications       ROS/MED HX  ENT/Pulmonary:     (+)                tobacco use, Current use,                       Neurologic:  - neg neurologic ROS     Cardiovascular:     (+)  hypertension- -   -  - -                                      METS/Exercise Tolerance: >4 METS    Hematologic:  - neg hematologic  ROS     Musculoskeletal:       GI/Hepatic:     (+) GERD, Asymptomatic on medication,       appendicitis (Acute),           Renal/Genitourinary:  - neg Renal ROS     Endo:     (+)               Obesity (BMI 36),       Psychiatric/Substance Use:     (+) psychiatric history anxiety and depression       Infectious Disease:       Malignancy:       Other:      (+)  , H/O Chronic Pain (low back),  (-) Any chance pregnant       Physical Exam    Airway        Mallampati: III   TM distance: > 3 FB   Neck ROM: full   Mouth opening: > 3 cm    Respiratory Devices and Support         Dental     Comment: Good        Cardiovascular   cardiovascular exam normal          Pulmonary   pulmonary exam normal            OUTSIDE LABS:  CBC:   Lab Results   Component Value Date    WBC 10.9 07/20/2024    WBC 10.7 04/20/2023    HGB 12.3 07/20/2024    HGB 10.8 (L) 05/10/2023    HCT 37.9 07/20/2024    HCT 31.0 (L) 04/20/2023     07/20/2024     05/21/2023     BMP:   Lab Results   Component Value Date     07/20/2024     (L) 04/20/2023    POTASSIUM 4.1 07/20/2024    POTASSIUM 3.7 04/20/2023    CHLORIDE 105 07/20/2024    CHLORIDE 99 04/20/2023    CO2 26 07/20/2024    CO2 19 (L) 04/20/2023    BUN 9.4 07/20/2024    BUN 5.3 (L) 04/20/2023    CR 0.72 07/20/2024    CR 0.44 (L) 05/21/2023     (H) 07/20/2024    GLC 93 04/20/2023     COAGS: No results found for: \"PTT\", \"INR\", \"FIBR\"  POC:   Lab Results   Component Value Date    HCG Negative 09/17/2014     HEPATIC:   Lab Results   Component Value Date    ALBUMIN 3.9 07/20/2024    PROTTOTAL 6.2 (L) 07/20/2024    ALT 15 07/20/2024    AST 13 07/20/2024    ALKPHOS 70 " "07/20/2024    BILITOTAL 0.3 07/20/2024     OTHER:   Lab Results   Component Value Date    BESSIE 8.9 07/20/2024    MAG 2.2 07/20/2024       Anesthesia Plan    ASA Status:  3, emergent    NPO Status:  ELEVATED Aspiration Risk/Unknown (NPO appropriate)       Induction: Intravenous, Propofol, RSI.   Maintenance: TIVA.   Techniques and Equipment:     - Airway: Video-Laryngoscope       Consents    Anesthesia Plan(s) and associated risks, benefits, and realistic alternatives discussed. Questions answered and patient/representative(s) expressed understanding.     - Discussed: Risks, Benefits and Alternatives for BOTH SEDATION and the PROCEDURE were discussed     - Discussed with:  Patient       - Patient is DNR/DNI Status: No          Postoperative Care    Pain management: Multi-modal analgesia.   PONV prophylaxis: Ondansetron (or other 5HT-3), Dexamethasone or Solumedrol     Comments:    Other Comments: TIVA    Glidescope  RSI    Magnesium  Ketamine 30 mg         Vanessa Rolle MD    I have reviewed the pertinent notes and labs in the chart from the past 30 days and (re)examined the patient.  Any updates or changes from those notes are reflected in this note.              # Obesity: Estimated body mass index is 35.99 kg/m  as calculated from the following:    Height as of this encounter: 1.549 m (5' 1\").    Weight as of this encounter: 86.4 kg (190 lb 7.6 oz).      "

## 2024-07-20 NOTE — OP NOTE
Name:  Laura PUCKETT Ellis  PCP:  Maggi Frank  Procedure Date:  7/19/2024 - 7/20/2024      Procedure(s):  APPENDECTOMY, LAPAROSCOPIC    Pre-Procedure Diagnosis:  Appendicitis [K37]     Post-Procedure Diagnosis:    Acute appendicitis    Surgeon(s):  Raúl Reynaga DO    Circulator: Essence Freed RN  Scrub Person: Nicole Washington    Anesthesia Type:  GET      Findings:  Acute appendicitis    Operative Report:    The patient was taken to the operating room and placed in a supine position.  SCDs were placed on bilateral lower extremities.  Antibiotics were given prior to skin incision.  The abdomen was prepped and draped in a sterile fashion.    I began the first portion of the procedure by injecting quarter percent Marcaine with epinephrine into the infraumbilical position.  I then made a 5 mm transverse incision below the umbilicus and established a pneumoperitoneum using a Veress needle technique.  Once the pneumoperitoneum was established,I placed a 5 mm trocar with a 5 mm 30  camera into the abdomen.  The surrounding structures were scrutinized for any injuries upon entry.  I did not find any. I placed an 12 mm trocar in the left lower quadrant position as well as 1 suprapubic 5 mm trochars under direct visualization.  All trochars were placed after local anesthetic was injected to the fascial layers.      I then manipulated the large and small bowel to gain access to the appendix.  Once found, I made a window at the base of the appendix and then fired a 45 mm blue load stapler across the base the mesoappendix.  I then fired an additional 45 mm white load stapler across the mesoappendix.  I bolster this with additional 10 mm clips to ensure hemostasis at the staple line.  I then placed the appendix in an 10 mm Endo Catch bag and brought out the left lower quadrant incision.  Next I closed the 12 mm fascial defect with an 0 Vicryl suture using a Tu Torres needle technique.  I  removed all trochars and deflated the abdomen.  I then closed all skin edges with 4-0 Monocryl subcuticular sutures.  The wounds were then cleaned and covered with a dry, sterile dressing.  All sponge counts and needle counts were correct at the end of the procedure.    Estimated Blood Loss:   5cc    Specimens:    ID Type Source Tests Collected by Time Destination   1 :  Tissue Appendix SURGICAL PATHOLOGY EXAM Raúl Reynaga DO 7/20/2024 10:39 AM           Drains:        Complications:    None    Raúl Reynaga DO

## 2024-07-20 NOTE — CONSULTS
General Surgery Consultation  Laura Corral MRN# [id number]   Age/Sex: 29 year old female YOB: 1995     Reason for consult: Acute appendicitis        Requesting physician: Dr. Precious Cevallos                   Assessment and Plan:   Assessment:  Acute appendicitis with appendicolith  Ms. Corral is a 28 yo F admitted with RLQ abdominal pain, nausea and vomiting x1 day.  Afebrile and vital stable.  Labs without leukocytosis and otherwise unremarkable.  CT abdomen/pelvis obtained at outside hospital demonstrated dilated appendix with appendicolith consistent with acute appendicitis, no evidence of abscess.  Discussed pathophysiology of appendix with patient and recommendation for surgical intervention for laparoscopic appendectomy.  Patient amenable to proceed with surgery.    Plan:  -NPO for procedure  -IV antibiotics  -OR for laparoscopic appendectomy. Anticipate patient will likely be discharged post-operatively pending operative findings        Physician Attestation     I saw and evaluated Laura Corral as part of a shared APRN/PA visit.     I personally reviewed the vital signs, medications, labs, and imaging.    I personally provided a substantive portion of care for this patient and I approve the care plan as written by the SIOBHAN.  I was involved with Medical Decision Making includin day history of abdominal pain started in the back migrated to right lower quadrant.  Has had a previous history of similar right lower quadrant pain several years ago but did not have her appendix removed at that time.  Pain persisted and now localized to the right lower quadrant.  No other complaints at present.  Abdomen-soft, positive Rovsing sign, positive tenderness palpation right lower quadrant  CT scan-acute appendicitis  Assessment/plan-acute appendicitis.  Pathophysiology of appendicitis as well as surgical versus nonoperative management strategies were discussed.  Plan will be  for laparoscopic appendectomy with discharge to home today.    Raúl Reynaga DO  Date of Service (when I saw the patient): 07/20/24           Chief Complaint:   No chief complaint on file.       History is obtained from the patient and electronic health record    HPI:   Laura Corral is a 29 year old female with PMH of depression, iron deficiency anemia who presented from OSH with right lower quadrant abdominal pain, nausea and vomiting x 1 day.  Patient states that yesterday afternoon around 1 PM she developed back pain which radiated through to her abdomen and then localized in the right lower quadrant.  She initially attributed her symptoms to gas pains and tried taking Gas-X without relief.  Associated symptoms included nausea and vomiting.  Given her progressively worsening symptoms, she presented to OSH for further evaluation.  Currently, her abdominal pain is controlled.  No further nausea or vomiting since arrival to the hospital.  Denies fever, chills, diarrhea, constipation, hematochezia, melena.  Denies any past surgical history.  Endorses daily tobacco smoking history and social alcohol use.          Past Medical History:     Past Medical History:   Diagnosis Date    Anemia during pregnancy in third trimester 04/24/2023    28-week hemoglobin: 10.3 g/dL  Start Slow Fe 142 mg p.o. every other day with vitamin C 250 mg p.o.  5/10/2023: Hemoglobin 10.8 g/dL, taking beef liver tablets.  Iron studies ordered:____________    Chronic bilateral low back pain     Elevated blood pressure reading without diagnosis of hypertension 04/24/2023 11/28/2022: 13 weeks 1 day: 149/85    Encounter for supervision of normal first pregnancy in third trimester 04/24/2023    Clinic/Hospital: [facility]  Partner Name: Ino  Ultrasound predicts sex: Surprise  Childrens names/ages: N/A  Previous labor experiences: N/A  Waterbirth (interest, declined, ineligible): Desires  Date waterbirth patient  education reviewed: 5/3/23  Level of education/occupation: Some college, business administration  Pertinent History: INGE and MDD stable on sertraline 125 mg p.    High blood pressure due to oral contraceptive     Normotensive after discontinuing    More than expected weight gain in pregnancy 04/24/2023    Normal labor 05/21/2023              Past Surgical History:     Past Surgical History:   Procedure Laterality Date    WISDOM TOOTH EXTRACTION      ZZHC CREATE EARDRUM OPENING,GEN ANESTH  1996             Social History:    reports that she has been smoking cigarettes. She has never been exposed to tobacco smoke. She has never used smokeless tobacco. She reports current alcohol use. She reports that she does not use drugs.           Family History:     Family History   Problem Relation Age of Onset    Hypertension Mother     Migraines Mother     Anemia Mother     Child Abuse Mother     Asthma Mother     Depression Father     Alcoholism Father     Child Abuse Father     No Known Problems Sister     Cardiovascular Maternal Grandmother 64        aortic aneur.    Depression Maternal Grandmother     Hypertension Maternal Grandmother     Cancer - colorectal Maternal Grandfather     Depression Maternal Grandfather     Hypertension Maternal Grandfather     Pancreatic Cancer Maternal Grandfather     Depression Paternal Grandmother     Hypertension Paternal Grandmother     Depression Paternal Grandfather     Hypertension Paternal Grandfather     Arthritis Maternal Aunt     Depression Maternal Aunt     Alcoholism Maternal Aunt     Migraines Maternal Aunt     Anesthesia Reaction Maternal Aunt     Asthma Maternal Aunt     Child Abuse Maternal Aunt     Migraines Maternal Uncle               Allergies:     Allergies   Allergen Reactions    Latex Hives     Precaution              Medications:     Prior to Admission medications    Medication Sig Start Date End Date Taking? Authorizing Provider   citalopram (CELEXA) 20 MG tablet TAKE  "1 TABLET(20 MG) BY MOUTH DAILY 6/5/24  Yes Maggi Frank APRN CNP              Review of Systems:   The Review of Systems is negative other than noted in the HPI            Physical Exam:   Patient Vitals for the past 24 hrs:   BP Temp Temp src Pulse Resp SpO2 Height Weight   07/20/24 0716 114/59 98.9  F (37.2  C) Oral -- 16 -- -- --   07/20/24 0131 -- -- -- -- -- -- 1.549 m (5' 1\") 86.4 kg (190 lb 7.6 oz)   07/20/24 0115 120/68 98.7  F (37.1  C) Oral 89 18 99 % -- --          Intake/Output Summary (Last 24 hours) at 7/20/2024 0905  Last data filed at 7/20/2024 0737  Gross per 24 hour   Intake 275 ml   Output --   Net 275 ml      Constitutional:   awake, alert, cooperative, no apparent distress, and appears stated age       Eyes:   PERRL, conjunctiva/corneas clear, EOM's intact; no scleral edema or icterus noted        ENT:   Normocephalic, without obvious abnormality, atraumatic, Lips, mucosa, and tongue normal        Lungs:   Normal respiratory effort, no accessory muscle use       Cardiovascular:   Regular rate and rhythm       Abdomen:   Soft, tender to palpation over RLQ, non-distended.       Musculoskeletal:   No obvious swelling, bruising or deformity       Skin:   Skin color and texture normal for patient, no rashes or lesions              Data:         All imaging studies reviewed by me.    Results for orders placed or performed during the hospital encounter of 07/19/24 (from the past 24 hour(s))   CBC with platelets differential    Narrative    The following orders were created for panel order CBC with platelets differential.  Procedure                               Abnormality         Status                     ---------                               -----------         ------                     CBC with platelets and d...[[id number]]                      Final result                 Please view results for these tests on the individual orders.   Comprehensive metabolic panel   Result Value Ref " Range    Sodium 138 135 - 145 mmol/L    Potassium 4.1 3.4 - 5.3 mmol/L    Carbon Dioxide (CO2) 26 22 - 29 mmol/L    Anion Gap 7 7 - 15 mmol/L    Urea Nitrogen 9.4 6.0 - 20.0 mg/dL    Creatinine 0.72 0.51 - 0.95 mg/dL    GFR Estimate >90 >60 mL/min/1.73m2    Calcium 8.9 8.8 - 10.4 mg/dL    Chloride 105 98 - 107 mmol/L    Glucose 117 (H) 70 - 99 mg/dL    Alkaline Phosphatase 70 40 - 150 U/L    AST 13 0 - 45 U/L    ALT 15 0 - 50 U/L    Protein Total 6.2 (L) 6.4 - 8.3 g/dL    Albumin 3.9 3.5 - 5.2 g/dL    Bilirubin Total 0.3 <=1.2 mg/dL   Magnesium   Result Value Ref Range    Magnesium 2.2 1.7 - 2.3 mg/dL   CBC with platelets and differential   Result Value Ref Range    WBC Count 10.9 4.0 - 11.0 10e3/uL    RBC Count 4.43 3.80 - 5.20 10e6/uL    Hemoglobin 12.3 11.7 - 15.7 g/dL    Hematocrit 37.9 35.0 - 47.0 %    MCV 86 78 - 100 fL    MCH 27.8 26.5 - 33.0 pg    MCHC 32.5 31.5 - 36.5 g/dL    RDW 14.4 10.0 - 15.0 %    Platelet Count 265 150 - 450 10e3/uL    % Neutrophils 67 %    % Lymphocytes 25 %    % Monocytes 7 %    % Eosinophils 1 %    % Basophils 1 %    % Immature Granulocytes 1 %    NRBCs per 100 WBC 0 <1 /100    Absolute Neutrophils 7.3 1.6 - 8.3 10e3/uL    Absolute Lymphocytes 2.7 0.8 - 5.3 10e3/uL    Absolute Monocytes 0.7 0.0 - 1.3 10e3/uL    Absolute Eosinophils 0.1 0.0 - 0.7 10e3/uL    Absolute Basophils 0.1 0.0 - 0.2 10e3/uL    Absolute Immature Granulocytes 0.1 <=0.4 10e3/uL    Absolute NRBCs 0.0 10e3/uL           Bing Prasad PA-C  [address] [address] [phone]

## 2024-07-20 NOTE — PHARMACY-ADMISSION MEDICATION HISTORY
Pharmacist Admission Medication History    Admission medication history is complete. The information provided in this note is only as accurate as the sources available at the time of the update.    Information Source(s): Family member via in-person    Pertinent Information: Patient's only home current medication is Citalopram. Per mother, patient will plan to take when she returns home at discharge later today 7/20/24.    Changes made to PTA medication list:  Added: None  Deleted: Metoclopramide, Prednisone, Prenatal vit, mometasone  Changed: None    Allergies reviewed with patient and updates made in EHR: yes    Medication History Completed By: OBI GOMEZ RPH 7/20/2024 8:23 AM    PTA Med List   Medication Sig Last Dose    citalopram (CELEXA) 20 MG tablet TAKE 1 TABLET(20 MG) BY MOUTH DAILY 7/19/2024 at am - patient prefers to take when she gets home later today 7/20/24

## 2024-07-20 NOTE — ANESTHESIA POSTPROCEDURE EVALUATION
Patient: Laura Corral    Procedure: Procedure(s):  APPENDECTOMY, LAPAROSCOPIC       Anesthesia Type:  No value filed.    Note:  Disposition: Inpatient   Postop Pain Control: Uneventful            Sign Out: Well controlled pain   PONV: No   Neuro/Psych: Uneventful            Sign Out: Acceptable/Baseline neuro status   Airway/Respiratory: Uneventful            Sign Out: Acceptable/Baseline resp. status   CV/Hemodynamics: Uneventful            Sign Out: Acceptable CV status; No obvious hypovolemia; No obvious fluid overload   Other NRE: NONE   DID A NON-ROUTINE EVENT OCCUR? No       Last vitals:  Vitals Value Taken Time   /67 07/20/24 1130   Temp 36.3  C (97.3  F) 07/20/24 1057   Pulse 66 07/20/24 1141   Resp 15 07/20/24 1141   SpO2 96 % 07/20/24 1141   Vitals shown include unfiled device data.    Electronically Signed By: Vanessa Rolle MD  July 20, 2024  11:43 AM

## 2024-07-20 NOTE — PLAN OF CARE
"  Problem: Adult Inpatient Plan of Care  Goal: Patient-Specific Goal (Individualized)  Description: You can add care plan individualizations to a care plan. Examples of Individualization might be:  \"Parent requests to be called daily at 9am for status\", \"I have a hard time hearing out of my right ear\", or \"Do not touch me to wake me up as it startles  me\".  Outcome: Progressing  Flowsheets (Taken 7/20/2024 0131)  Anxieties, Fears or Concerns: none  Goal: Absence of Hospital-Acquired Illness or Injury  Outcome: Progressing  Intervention: Identify and Manage Fall Risk  Recent Flowsheet Documentation  Taken 7/20/2024 0131 by Garrison Lucas RN  Safety Promotion/Fall Prevention:   clutter free environment maintained   lighting adjusted   nonskid shoes/slippers when out of bed   patient and family education   safety round/check completed  Intervention: Prevent Skin Injury  Recent Flowsheet Documentation  Taken 7/20/2024 0131 by Garrison Lucas RN  Body Position: position changed independently  Goal: Optimal Comfort and Wellbeing  Outcome: Progressing  Intervention: Monitor Pain and Promote Comfort  Recent Flowsheet Documentation  Taken 7/20/2024 0653 by Garrison Lucas RN  Pain Management Interventions: medication (see MAR)  Taken 7/20/2024 0230 by Garrison Lucas RN  Pain Management Interventions: medication (see MAR)  Taken 7/20/2024 0131 by Garrison Lucas RN  Pain Management Interventions: emotional support     Problem: Pain Acute  Goal: Optimal Pain Control and Function  Outcome: Progressing  Intervention: Develop Pain Management Plan  Recent Flowsheet Documentation  Taken 7/20/2024 0653 by Garrison Lucas RN  Pain Management Interventions: medication (see MAR)  Taken 7/20/2024 0230 by Garrison Lucas RN  Pain Management Interventions: medication (see MAR)  Taken 7/20/2024 0131 by Garrison Lucas RN  Pain Management Interventions: emotional support  Intervention: Prevent or Manage Pain  Recent Flowsheet Documentation  Taken " 7/20/2024 0131 by Garrison Lucas, RN  Medication Review/Management: medications reviewed       Pt denies nausea. Pain is relieved with IV dilaudid. Pt has been NPO since she arrived to unit. Mag and K protocols - recheck tomorrow AM. IVF and IV abx infusing. Independent. Voiding well. Surgery consult this AM for possible surgery. Slept in between cares.

## 2024-07-23 LAB
PATH REPORT.COMMENTS IMP SPEC: NORMAL
PATH REPORT.COMMENTS IMP SPEC: NORMAL
PATH REPORT.FINAL DX SPEC: NORMAL
PATH REPORT.GROSS SPEC: NORMAL
PATH REPORT.MICROSCOPIC SPEC OTHER STN: NORMAL
PATH REPORT.RELEVANT HX SPEC: NORMAL
PHOTO IMAGE: NORMAL

## 2024-07-25 ENCOUNTER — TELEPHONE (OUTPATIENT)
Dept: SURGERY | Facility: CLINIC | Age: 29
End: 2024-07-25
Payer: COMMERCIAL

## 2024-07-25 NOTE — TELEPHONE ENCOUNTER
[facility] Post-Op Phone Call                     Surgeon: Raúl Reynaga DO    Surgery: Laparoscopic Appendectomy  Date of Surgery: 7/20/2024  Discharge Date: 7/20/2024    Date/Time Called:   Date: 7/25/2024 Time: 1:42 PM   Attempt: First    Pain Control:  Intensity: Moderate (4 - 5)  Duration/Location/Explain: Mild to moderate amount of pain that is managed with rest, Tylenol and Ibuprofen, and ice packs. Patient will continue with this regimen at this time.     Incisions:  Drainage? clean and dry  Any fever type symptoms? No    GI:  Nausea? No  Vomiting? No  BM? Yes  Gas? Yes  Voiding Frequency? 4 or more/day   Appetite? Good    Activity:  Walking activity? Yes  Frequency/Type: Ambulating frequently as tolerated.  Restrictions: No lifting over 20 pounds and no strenuous physical activity for 2 weeks.    Return to Work Plans?  Patient does not work.     Post-op appointment made? No      Thank you for your time. Please do not hesitate to call us with any questions or concerns.    Call completed by: Olivia ABDI RN

## 2025-01-03 DIAGNOSIS — F32.A DEPRESSIVE DISORDER: ICD-10-CM

## 2025-01-03 DIAGNOSIS — F41.1 GAD (GENERALIZED ANXIETY DISORDER): ICD-10-CM

## 2025-01-04 ENCOUNTER — HEALTH MAINTENANCE LETTER (OUTPATIENT)
Age: 30
End: 2025-01-04

## 2025-01-06 RX ORDER — CITALOPRAM HYDROBROMIDE 20 MG/1
20 TABLET ORAL DAILY
Qty: 90 TABLET | Refills: 0 | Status: SHIPPED | OUTPATIENT
Start: 2025-01-06

## (undated) DEVICE — SU MONOCRYL+ 4-0 18IN PS2 UND MCP496G

## (undated) DEVICE — Device

## (undated) DEVICE — BLADE KNIFE SURG 11 371111

## (undated) DEVICE — STPL ENDO RELOAD 45X2.5MM VASC TR45W

## (undated) DEVICE — ENDO TROCAR SLEEVE KII Z-THREADED 05X100MM CTS02

## (undated) DEVICE — SUCTION MANIFOLD NEPTUNE 2 SYS 1 PORT 702-025-000

## (undated) DEVICE — DECANTER VIAL 2006S

## (undated) DEVICE — TUBING SMOKE EVAC PNEUMOCLEAR HIGH FLOW 0620050250

## (undated) DEVICE — CUSTOM PACK LAP CHOLE SBA5BLCHEA

## (undated) DEVICE — STPL ENDO RELOAD 45X3.5MM 6R45B

## (undated) DEVICE — STPL ENDO LINEAR CUT ARTICULATING 45MM ATS45

## (undated) DEVICE — ESU GROUND PAD ADULT REM W/15' CORD E7507DB

## (undated) DEVICE — PREP CHLORAPREP 26ML TINTED HI-LITE ORANGE 930815

## (undated) DEVICE — BASIN EMESIS STERILE  SSK9005A

## (undated) DEVICE — ENDO TROCAR FIRST ENTRY KII FIOS Z-THRD 12X100MM CTF73

## (undated) DEVICE — SU VICRYL+ 0 27 UR6 VLT VCP603H

## (undated) DEVICE — ENDO POUCH UNIV RETRIEVAL SYSTEM INZII 10MM CD001

## (undated) DEVICE — GLOVE BIOGEL PI ORTHOPRO SZ 7.5 47675

## (undated) DEVICE — ENDO TROCAR FIRST ENTRY KII FIOS Z-THRD 05X100MM CTF03

## (undated) DEVICE — NDL INSUFFLATION 13GA 120MM C2201

## (undated) DEVICE — CLIP LIGACLIP LG YELLOW LT400

## (undated) DEVICE — SOL WATER IRRIG 1000ML BOTTLE 2F7114

## (undated) RX ORDER — PROPOFOL 10 MG/ML
INJECTION, EMULSION INTRAVENOUS
Status: DISPENSED
Start: 2024-07-20

## (undated) RX ORDER — FENTANYL CITRATE-0.9 % NACL/PF 10 MCG/ML
PLASTIC BAG, INJECTION (ML) INTRAVENOUS
Status: DISPENSED
Start: 2024-07-20

## (undated) RX ORDER — ONDANSETRON 2 MG/ML
INJECTION INTRAMUSCULAR; INTRAVENOUS
Status: DISPENSED
Start: 2024-07-20

## (undated) RX ORDER — FENTANYL CITRATE 50 UG/ML
INJECTION, SOLUTION INTRAMUSCULAR; INTRAVENOUS
Status: DISPENSED
Start: 2024-07-20

## (undated) RX ORDER — BUPIVACAINE HYDROCHLORIDE 2.5 MG/ML
INJECTION, SOLUTION EPIDURAL; INFILTRATION; INTRACAUDAL
Status: DISPENSED
Start: 2024-07-20

## (undated) RX ORDER — DEXAMETHASONE SODIUM PHOSPHATE 10 MG/ML
INJECTION, SOLUTION INTRAMUSCULAR; INTRAVENOUS
Status: DISPENSED
Start: 2024-07-20